# Patient Record
Sex: FEMALE | Race: BLACK OR AFRICAN AMERICAN | NOT HISPANIC OR LATINO | Employment: OTHER | URBAN - METROPOLITAN AREA
[De-identification: names, ages, dates, MRNs, and addresses within clinical notes are randomized per-mention and may not be internally consistent; named-entity substitution may affect disease eponyms.]

---

## 2018-03-05 PROBLEM — I05.9 MITRAL VALVE DISORDER: Status: ACTIVE | Noted: 2018-03-05

## 2022-01-06 ENCOUNTER — OFFICE VISIT (OUTPATIENT)
Dept: FAMILY MEDICINE | Facility: CLINIC | Age: 72
End: 2022-01-06
Payer: COMMERCIAL

## 2022-01-06 VITALS
DIASTOLIC BLOOD PRESSURE: 62 MMHG | SYSTOLIC BLOOD PRESSURE: 120 MMHG | BODY MASS INDEX: 26.71 KG/M2 | TEMPERATURE: 98 F | WEIGHT: 165.44 LBS | OXYGEN SATURATION: 97 % | HEART RATE: 47 BPM

## 2022-01-06 DIAGNOSIS — R00.1 BRADYCARDIA: ICD-10-CM

## 2022-01-06 DIAGNOSIS — R07.9 CHEST PAIN WITH HIGH RISK OF ACUTE CORONARY SYNDROME: ICD-10-CM

## 2022-01-06 DIAGNOSIS — R53.83 FATIGUE, UNSPECIFIED TYPE: Primary | ICD-10-CM

## 2022-01-06 PROCEDURE — 1125F AMNT PAIN NOTED PAIN PRSNT: CPT | Mod: CPTII,S$GLB,, | Performed by: PHYSICIAN ASSISTANT

## 2022-01-06 PROCEDURE — 1160F RVW MEDS BY RX/DR IN RCRD: CPT | Mod: CPTII,S$GLB,, | Performed by: PHYSICIAN ASSISTANT

## 2022-01-06 PROCEDURE — 3288F FALL RISK ASSESSMENT DOCD: CPT | Mod: CPTII,S$GLB,, | Performed by: PHYSICIAN ASSISTANT

## 2022-01-06 PROCEDURE — 1159F MED LIST DOCD IN RCRD: CPT | Mod: CPTII,S$GLB,, | Performed by: PHYSICIAN ASSISTANT

## 2022-01-06 PROCEDURE — 3078F PR MOST RECENT DIASTOLIC BLOOD PRESSURE < 80 MM HG: ICD-10-PCS | Mod: CPTII,S$GLB,, | Performed by: PHYSICIAN ASSISTANT

## 2022-01-06 PROCEDURE — 1159F PR MEDICATION LIST DOCUMENTED IN MEDICAL RECORD: ICD-10-PCS | Mod: CPTII,S$GLB,, | Performed by: PHYSICIAN ASSISTANT

## 2022-01-06 PROCEDURE — 3074F SYST BP LT 130 MM HG: CPT | Mod: CPTII,S$GLB,, | Performed by: PHYSICIAN ASSISTANT

## 2022-01-06 PROCEDURE — 99203 OFFICE O/P NEW LOW 30 MIN: CPT | Mod: S$GLB,,, | Performed by: PHYSICIAN ASSISTANT

## 2022-01-06 PROCEDURE — 1101F PT FALLS ASSESS-DOCD LE1/YR: CPT | Mod: CPTII,S$GLB,, | Performed by: PHYSICIAN ASSISTANT

## 2022-01-06 PROCEDURE — 1101F PR PT FALLS ASSESS DOC 0-1 FALLS W/OUT INJ PAST YR: ICD-10-PCS | Mod: CPTII,S$GLB,, | Performed by: PHYSICIAN ASSISTANT

## 2022-01-06 PROCEDURE — 3288F PR FALLS RISK ASSESSMENT DOCUMENTED: ICD-10-PCS | Mod: CPTII,S$GLB,, | Performed by: PHYSICIAN ASSISTANT

## 2022-01-06 PROCEDURE — 3078F DIAST BP <80 MM HG: CPT | Mod: CPTII,S$GLB,, | Performed by: PHYSICIAN ASSISTANT

## 2022-01-06 PROCEDURE — 3074F PR MOST RECENT SYSTOLIC BLOOD PRESSURE < 130 MM HG: ICD-10-PCS | Mod: CPTII,S$GLB,, | Performed by: PHYSICIAN ASSISTANT

## 2022-01-06 PROCEDURE — 1160F PR REVIEW ALL MEDS BY PRESCRIBER/CLIN PHARMACIST DOCUMENTED: ICD-10-PCS | Mod: CPTII,S$GLB,, | Performed by: PHYSICIAN ASSISTANT

## 2022-01-06 PROCEDURE — 1125F PR PAIN SEVERITY QUANTIFIED, PAIN PRESENT: ICD-10-PCS | Mod: CPTII,S$GLB,, | Performed by: PHYSICIAN ASSISTANT

## 2022-01-06 PROCEDURE — 3008F BODY MASS INDEX DOCD: CPT | Mod: CPTII,S$GLB,, | Performed by: PHYSICIAN ASSISTANT

## 2022-01-06 PROCEDURE — 3008F PR BODY MASS INDEX (BMI) DOCUMENTED: ICD-10-PCS | Mod: CPTII,S$GLB,, | Performed by: PHYSICIAN ASSISTANT

## 2022-01-06 PROCEDURE — 99203 PR OFFICE/OUTPT VISIT, NEW, LEVL III, 30-44 MIN: ICD-10-PCS | Mod: S$GLB,,, | Performed by: PHYSICIAN ASSISTANT

## 2022-01-06 RX ORDER — ERGOCALCIFEROL 1.25 MG/1
50000 CAPSULE ORAL
COMMUNITY
Start: 2021-02-03 | End: 2022-07-28 | Stop reason: SDUPTHER

## 2022-01-06 RX ORDER — FUROSEMIDE 40 MG/1
1 TABLET ORAL DAILY
COMMUNITY
Start: 2021-11-19 | End: 2022-01-20

## 2022-01-06 RX ORDER — HYDROCHLOROTHIAZIDE 12.5 MG/1
1 CAPSULE ORAL DAILY
COMMUNITY
Start: 2021-01-13 | End: 2022-01-14

## 2022-01-06 RX ORDER — OMEPRAZOLE 40 MG/1
1 CAPSULE, DELAYED RELEASE ORAL DAILY
COMMUNITY
Start: 2021-03-30 | End: 2022-07-28 | Stop reason: SDUPTHER

## 2022-01-06 NOTE — PROGRESS NOTES
Subjective:       Patient ID: Aleida Bliss is a 71 y.o. female.    Chief Complaint: Establish Care    Fatigue  This is a recurrent problem. The current episode started more than 1 month ago. The problem occurs constantly. The problem has been waxing and waning. Associated symptoms include chest pain, fatigue, vertigo and weakness. The symptoms are aggravated by exertion. Treatments tried: Reports being followed by [facility], but nothing has been done.  The treatment provided no relief.     Past Medical History:   Diagnosis Date    Coronary artery disease     Hypertension     SOB (shortness of breath)        Past Surgical History:   Procedure Laterality Date    CATARACT EXTRACTION EXTRACAPSULAR W/ INTRAOCULAR LENS IMPLANTATION      HYSTERECTOMY         Family History   Problem Relation Age of Onset    Heart disease Mother     Cancer Father         prostate       Social History     Socioeconomic History    Marital status:    Tobacco Use    Smoking status: Never Smoker    Smokeless tobacco: Never Used   Substance and Sexual Activity    Alcohol use: No    Drug use: No    Sexual activity: Not Currently       Review of patient's allergies indicates:   Allergen Reactions    Lisinopril Other (See Comments)     Other reaction(s): Cough      Losartan Other (See Comments)     Other reaction(s): Unknown      Cyclobenzaprine Rash         Current Outpatient Medications:     aspirin 81 MG Chew, Take 81 mg by mouth once daily., Disp: , Rfl:     atenolol (TENORMIN) 50 MG tablet, Take 25 mg by mouth 2 (two) times daily. , Disp: , Rfl:     ergocalciferol (ERGOCALCIFEROL) 50,000 unit Cap, Take 50,000 Units by mouth., Disp: , Rfl:     fluticasone propionate (FLONASE) 50 mcg/actuation nasal spray, 2 sprays (100 mcg total) by Each Nostril route once daily., Disp: 3 g, Rfl: 3    furosemide (LASIX) 40 MG tablet, Take 1 tablet by mouth once daily., Disp: , Rfl:     hydrALAZINE (APRESOLINE) 25 MG tablet,  Take 1 tablet (25 mg total) by mouth every 8 (eight) hours., Disp: 90 tablet, Rfl: 3    hydroCHLOROthiazide (MICROZIDE) 12.5 mg capsule, Take 1 capsule by mouth once daily., Disp: , Rfl:     levocetirizine (XYZAL) 5 MG tablet, Take 5 mg by mouth once daily., Disp: , Rfl:     omeprazole (PRILOSEC) 40 MG capsule, Take 1 capsule by mouth once daily., Disp: , Rfl:     potassium chloride (KLOR-CON) 10 MEQ TbSR, Take 10 mEq by mouth once daily., Disp: , Rfl:     pravastatin (PRAVACHOL) 40 MG tablet, Take 40 mg by mouth every evening., Disp: , Rfl:     spironolactone (ALDACTONE) 25 MG tablet, Take 25 mg by mouth once daily., Disp: , Rfl:     sucralfate (CARAFATE) 1 gram tablet, Take 1 g by mouth every evening., Disp: , Rfl:     umeclidinium-vilanteroL (ANORO ELLIPTA) 62.5-25 mcg/actuation DsDv, Inhale 1 puff into the lungs., Disp: , Rfl:     /62   Pulse (!) 47   Temp 97.8 °F (36.6 °C) (Oral)   Wt 75 kg (165 lb 7.3 oz)   SpO2 97%   BMI 26.71 kg/m²   Review of Systems   Constitutional: Positive for activity change, appetite change and fatigue.   Respiratory: Positive for chest tightness and shortness of breath.    Cardiovascular: Positive for chest pain.   Neurological: Positive for dizziness, vertigo, syncope and weakness.         Objective:      Physical Exam  Constitutional:       General: She is not in acute distress.     Appearance: Normal appearance. She is not ill-appearing, toxic-appearing or diaphoretic.   HENT:      Head: Normocephalic and atraumatic.   Neck:      Vascular: Carotid bruit present.   Cardiovascular:      Rate and Rhythm: Bradycardia present. Rhythm irregular.   Musculoskeletal:      Cervical back: No rigidity or tenderness.   Lymphadenopathy:      Cervical: No cervical adenopathy.   Neurological:      Mental Status: She is alert.         Lab Results   Component Value Date    WBC 6.19 03/05/2018    HGB 11.8 (L) 03/05/2018    HCT 37.9 03/05/2018    MCV 88 03/05/2018      03/05/2018     CMP  Sodium   Date Value Ref Range Status   03/05/2018 144 136 - 145 mmol/L Final     Potassium   Date Value Ref Range Status   03/05/2018 4.0 3.5 - 5.1 mmol/L Final     Chloride   Date Value Ref Range Status   03/05/2018 108 95 - 110 mmol/L Final     CO2   Date Value Ref Range Status   03/05/2018 28 22 - 31 mmol/L Final     Glucose   Date Value Ref Range Status   03/05/2018 98 70 - 110 mg/dL Final     Comment:     The ADA recommends the following guidelines for fasting glucose:  Normal:       less than 100 mg/dL  Prediabetes:  100 mg/dL to 125 mg/dL  Diabetes:     126 mg/dL or higher       BUN   Date Value Ref Range Status   03/05/2018 13 7 - 18 mg/dL Final     Creatinine   Date Value Ref Range Status   03/05/2018 0.69 0.50 - 1.40 mg/dL Final     Calcium   Date Value Ref Range Status   03/05/2018 10.2 8.4 - 10.2 mg/dL Final     Total Protein   Date Value Ref Range Status   03/05/2018 7.6 6.0 - 8.4 g/dL Final     Albumin   Date Value Ref Range Status   03/05/2018 3.9 3.5 - 5.2 g/dL Final     Total Bilirubin   Date Value Ref Range Status   03/05/2018 0.6 0.2 - 1.3 mg/dL Final     Alkaline Phosphatase   Date Value Ref Range Status   03/05/2018 73 38 - 145 U/L Final     AST   Date Value Ref Range Status   03/05/2018 25 14 - 36 U/L Final     ALT   Date Value Ref Range Status   03/05/2018 32 10 - 44 U/L Final     Anion Gap   Date Value Ref Range Status   03/05/2018 8 8 - 16 mmol/L Final     eGFR if    Date Value Ref Range Status   03/05/2018 >60 >60 mL/min/1.73 m^2 Final     eGFR if non    Date Value Ref Range Status   03/05/2018 >60 >60 mL/min/1.73 m^2 Final     Comment:     Calculation used to obtain the estimated glomerular filtration  rate (eGFR) is the CKD-EPI equation.        No results found for: CHOL  No results found for: HDL  No results found for: LDLCALC  No results found for: TRIG  No results found for: CHOLHDL  Lab Results   Component Value Date    HGBA1C 6.2  (H) 12/15/2021     Assessment:       Problem List Items Addressed This Visit    None     Visit Diagnoses     Fatigue, unspecified type    -  Primary    Relevant Orders    Ambulatory referral/consult to Cardiology    Chest pain with high risk of acute coronary syndrome        Relevant Orders    Ambulatory referral/consult to Cardiology    Bradycardia        Relevant Orders    Ambulatory referral/consult to Cardiology          Plan:       Fatigue, unspecified type  -     Ambulatory referral/consult to Cardiology; Future; Expected date: 01/13/2022    Chest pain with high risk of acute coronary syndrome  -     Ambulatory referral/consult to Cardiology; Future; Expected date: 01/13/2022    Bradycardia  -     Ambulatory referral/consult to Cardiology; Future; Expected date: 01/13/2022

## 2022-01-12 DIAGNOSIS — Z12.11 COLON CANCER SCREENING: ICD-10-CM

## 2022-01-14 ENCOUNTER — OFFICE VISIT (OUTPATIENT)
Dept: CARDIOLOGY | Facility: CLINIC | Age: 72
End: 2022-01-14
Payer: COMMERCIAL

## 2022-01-14 VITALS
HEIGHT: 66 IN | SYSTOLIC BLOOD PRESSURE: 139 MMHG | HEART RATE: 72 BPM | WEIGHT: 165.81 LBS | DIASTOLIC BLOOD PRESSURE: 78 MMHG | BODY MASS INDEX: 26.65 KG/M2

## 2022-01-14 DIAGNOSIS — R94.30 ELEVATED LEFT VENTRICULAR END-DIASTOLIC PRESSURE (LVEDP): ICD-10-CM

## 2022-01-14 DIAGNOSIS — R53.83 FATIGUE, UNSPECIFIED TYPE: Chronic | ICD-10-CM

## 2022-01-14 DIAGNOSIS — Z86.79 H/O: RHEUMATIC FEVER: ICD-10-CM

## 2022-01-14 DIAGNOSIS — R00.1 BRADYCARDIA: Chronic | ICD-10-CM

## 2022-01-14 DIAGNOSIS — E78.00 HYPERCHOLESTEROLEMIA: ICD-10-CM

## 2022-01-14 DIAGNOSIS — R94.31 NONSPECIFIC ABNORMAL ELECTROCARDIOGRAM (ECG) (EKG): Chronic | ICD-10-CM

## 2022-01-14 DIAGNOSIS — I10 PRIMARY HYPERTENSION: Chronic | ICD-10-CM

## 2022-01-14 DIAGNOSIS — I27.20 PULMONARY HYPERTENSION: Chronic | ICD-10-CM

## 2022-01-14 DIAGNOSIS — I34.0 NONRHEUMATIC MITRAL VALVE REGURGITATION: ICD-10-CM

## 2022-01-14 DIAGNOSIS — R06.02 SOB (SHORTNESS OF BREATH): ICD-10-CM

## 2022-01-14 DIAGNOSIS — R07.9 CHEST PAIN WITH HIGH RISK OF ACUTE CORONARY SYNDROME: Primary | ICD-10-CM

## 2022-01-14 DIAGNOSIS — E66.3 OVERWEIGHT (BMI 25.0-29.9): Chronic | ICD-10-CM

## 2022-01-14 DIAGNOSIS — R26.89 IMBALANCE: Chronic | ICD-10-CM

## 2022-01-14 PROCEDURE — 3008F PR BODY MASS INDEX (BMI) DOCUMENTED: ICD-10-PCS | Mod: CPTII,S$GLB,, | Performed by: INTERNAL MEDICINE

## 2022-01-14 PROCEDURE — 3008F BODY MASS INDEX DOCD: CPT | Mod: CPTII,S$GLB,, | Performed by: INTERNAL MEDICINE

## 2022-01-14 PROCEDURE — 93010 ELECTROCARDIOGRAM REPORT: CPT | Mod: S$GLB,,, | Performed by: INTERNAL MEDICINE

## 2022-01-14 PROCEDURE — 99204 PR OFFICE/OUTPT VISIT, NEW, LEVL IV, 45-59 MIN: ICD-10-PCS | Mod: S$GLB,,, | Performed by: INTERNAL MEDICINE

## 2022-01-14 PROCEDURE — 99999 PR PBB SHADOW E&M-EST. PATIENT-LVL IV: ICD-10-PCS | Mod: PBBFAC,,, | Performed by: INTERNAL MEDICINE

## 2022-01-14 PROCEDURE — 1159F MED LIST DOCD IN RCRD: CPT | Mod: CPTII,S$GLB,, | Performed by: INTERNAL MEDICINE

## 2022-01-14 PROCEDURE — 3078F DIAST BP <80 MM HG: CPT | Mod: CPTII,S$GLB,, | Performed by: INTERNAL MEDICINE

## 2022-01-14 PROCEDURE — 99204 OFFICE O/P NEW MOD 45 MIN: CPT | Mod: S$GLB,,, | Performed by: INTERNAL MEDICINE

## 2022-01-14 PROCEDURE — 1126F PR PAIN SEVERITY QUANTIFIED, NO PAIN PRESENT: ICD-10-PCS | Mod: CPTII,S$GLB,, | Performed by: INTERNAL MEDICINE

## 2022-01-14 PROCEDURE — 1101F PR PT FALLS ASSESS DOC 0-1 FALLS W/OUT INJ PAST YR: ICD-10-PCS | Mod: CPTII,S$GLB,, | Performed by: INTERNAL MEDICINE

## 2022-01-14 PROCEDURE — 1126F AMNT PAIN NOTED NONE PRSNT: CPT | Mod: CPTII,S$GLB,, | Performed by: INTERNAL MEDICINE

## 2022-01-14 PROCEDURE — 3288F PR FALLS RISK ASSESSMENT DOCUMENTED: ICD-10-PCS | Mod: CPTII,S$GLB,, | Performed by: INTERNAL MEDICINE

## 2022-01-14 PROCEDURE — 1159F PR MEDICATION LIST DOCUMENTED IN MEDICAL RECORD: ICD-10-PCS | Mod: CPTII,S$GLB,, | Performed by: INTERNAL MEDICINE

## 2022-01-14 PROCEDURE — 99999 PR PBB SHADOW E&M-EST. PATIENT-LVL IV: CPT | Mod: PBBFAC,,, | Performed by: INTERNAL MEDICINE

## 2022-01-14 PROCEDURE — 3075F SYST BP GE 130 - 139MM HG: CPT | Mod: CPTII,S$GLB,, | Performed by: INTERNAL MEDICINE

## 2022-01-14 PROCEDURE — 3078F PR MOST RECENT DIASTOLIC BLOOD PRESSURE < 80 MM HG: ICD-10-PCS | Mod: CPTII,S$GLB,, | Performed by: INTERNAL MEDICINE

## 2022-01-14 PROCEDURE — 93010 EKG 12-LEAD: ICD-10-PCS | Mod: S$GLB,,, | Performed by: INTERNAL MEDICINE

## 2022-01-14 PROCEDURE — 93005 ELECTROCARDIOGRAM TRACING: CPT | Mod: PO

## 2022-01-14 PROCEDURE — 1101F PT FALLS ASSESS-DOCD LE1/YR: CPT | Mod: CPTII,S$GLB,, | Performed by: INTERNAL MEDICINE

## 2022-01-14 PROCEDURE — 3288F FALL RISK ASSESSMENT DOCD: CPT | Mod: CPTII,S$GLB,, | Performed by: INTERNAL MEDICINE

## 2022-01-14 PROCEDURE — 3075F PR MOST RECENT SYSTOLIC BLOOD PRESS GE 130-139MM HG: ICD-10-PCS | Mod: CPTII,S$GLB,, | Performed by: INTERNAL MEDICINE

## 2022-01-14 RX ORDER — NITROGLYCERIN 0.4 MG/1
0.4 TABLET SUBLINGUAL EVERY 5 MIN PRN
Qty: 25 TABLET | Refills: 0 | Status: SHIPPED | OUTPATIENT
Start: 2022-01-14 | End: 2022-03-30

## 2022-01-14 NOTE — PROGRESS NOTES
Subjective:    Patient ID:  Aleida Bliss is a 71 y.o. female who presents for Chest Pain        Chest Pain   This is a recurrent problem. The current episode started more than 1 month ago. The pain is present in the lateral region. The pain is at a severity of 4/10. The quality of the pain is described as pressure. The pain radiates to the left neck. Associated symptoms include shortness of breath. Pertinent negatives include no abdominal pain, back pain, claudication, cough, diaphoresis, dizziness, hemoptysis, irregular heartbeat, malaise/fatigue, near-syncope, numbness, orthopnea, palpitations, PND, sputum production, syncope, vomiting or weakness. The pain is aggravated by exertion. Risk factors include stress.   Shortness of Breath  The problem occurs daily. Associated symptoms include chest pain. Pertinent negatives include no abdominal pain, claudication, hemoptysis, leg swelling, orthopnea, PND, sputum production, syncope, vomiting or wheezing. The symptoms are aggravated by exercise. She has tried rest for the symptoms.   Hypertension  This is a chronic problem. The current episode started more than 1 year ago. Associated symptoms include chest pain and shortness of breath. Pertinent negatives include no blurred vision, malaise/fatigue, orthopnea, palpitations or PND. Risk factors for coronary artery disease include obesity and post-menopausal state. Past treatments include beta blockers, diuretics and direct vasodilators. Hypertensive end-organ damage includes left ventricular hypertrophy.     NEW PATIENT EVALUATION REFERRED BY [facility] FOR CHEST PAIN, HAD A CARDIAC CATHETERIZATION RIGHT AND LEFT HEART CATHETERIZATION IN 2018 BY  THEY DID SHOWED ELEVATED LEFT VENTRICULAR END-DIASTOLIC PRESSURE AND MILD PULMONARY HYPERTENSION MILD MR, MILD CAD, PT IS BORDERLINE DIABETIC OR PRE-DIABETIC, WAS [place]IZ[place] IN [place], [place] [facility] [place] STARTED ON LASIX, H/O RHEUMATIC FEVER,IMBALANCE [place] MON,  WORSE,  SEE ROS    Past Medical History:   Diagnosis Date    Coronary artery disease     Hypertension     SOB (shortness of breath)      Past Surgical History:   Procedure Laterality Date    CATARACT EXTRACTION EXTRACAPSULAR W/ INTRAOCULAR LENS IMPLANTATION      HYSTERECTOMY       Family History   Problem Relation Age of Onset    Heart disease Mother     Cancer Father         prostate     Social History     Socioeconomic History    Marital status:    Tobacco Use    Smoking status: Never Smoker    Smokeless tobacco: Never Used   Substance and Sexual Activity    Alcohol use: No    Drug use: No    Sexual activity: Not Currently       Review of patient's allergies indicates:   Allergen Reactions    Lisinopril Other (See Comments)     Other reaction(s): Cough      Losartan Other (See Comments)     Other reaction(s): Unknown      Cyclobenzaprine Rash       Current Outpatient Medications:     aspirin 81 MG Chew, Take 81 mg by mouth once daily., Disp: , Rfl:     atenolol (TENORMIN) 50 MG tablet, Take 25 mg by mouth 2 (two) times daily. , Disp: , Rfl:     ergocalciferol (ERGOCALCIFEROL) 50,000 unit Cap, Take 50,000 Units by mouth., Disp: , Rfl:     fluticasone propionate (FLONASE) 50 mcg/actuation nasal spray, 2 sprays (100 mcg total) by Each Nostril route once daily., Disp: 3 g, Rfl: 3    furosemide (LASIX) 40 MG tablet, Take 1 tablet by mouth once daily., Disp: , Rfl:     hydrALAZINE (APRESOLINE) 25 MG tablet, Take 1 tablet (25 mg total) by mouth every 8 (eight) hours., Disp: 90 tablet, Rfl: 3    omeprazole (PRILOSEC) 40 MG capsule, Take 1 capsule by mouth once daily., Disp: , Rfl:     potassium chloride (KLOR-CON) 10 MEQ TbSR, Take 10 mEq by mouth once daily., Disp: , Rfl:     pravastatin (PRAVACHOL) 40 MG tablet, Take 40 mg by mouth every evening., Disp: , Rfl:     sucralfate (CARAFATE) 1 gram tablet, Take 1 g by mouth every evening., Disp: , Rfl:     umeclidinium-vilanteroL (ANORO  "ELLIPTA) 62.5-25 mcg/actuation DsDv, Inhale 1 puff into the lungs., Disp: , Rfl:     nitroGLYCERIN (NITROSTAT) 0.4 MG SL tablet, Place 1 tablet (0.4 mg total) under the tongue every 5 (five) minutes as needed for Chest pain., Disp: 25 tablet, Rfl: 0    Review of Systems   Constitutional: Negative for chills, diaphoresis, malaise/fatigue and night sweats.   HENT: Negative for congestion, hearing loss and nosebleeds.    Eyes: Negative for blurred vision, discharge, double vision and visual disturbance.   Cardiovascular: Positive for chest pain and dyspnea on exertion. Negative for claudication, cyanosis, irregular heartbeat, leg swelling, near-syncope, orthopnea, palpitations, paroxysmal nocturnal dyspnea and syncope.   Respiratory: Positive for shortness of breath. Negative for cough, hemoptysis, snoring, sputum production and wheezing.    Endocrine: Negative for cold intolerance, heat intolerance and polyuria.   Hematologic/Lymphatic: Negative for adenopathy. Does not bruise/bleed easily.   Skin: Negative for color change, itching and nail changes.   Musculoskeletal: Positive for arthritis (RA). Negative for back pain and falls.   Gastrointestinal: Positive for bloating. Negative for abdominal pain, change in bowel habit, dysphagia, heartburn, hematemesis, jaundice, melena and vomiting.   Genitourinary: Negative for dysuria, flank pain and frequency.   Neurological: Positive for loss of balance (STAGERS). Negative for brief paralysis, dizziness, focal weakness, light-headedness, numbness, paresthesias, sensory change, tremors and weakness.   Psychiatric/Behavioral: Negative for altered mental status, depression, memory loss and substance abuse. The patient is not nervous/anxious.    Allergic/Immunologic: Negative for environmental allergies and persistent infections.        Objective:      Vitals:    01/14/22 0902   BP: 139/78   Pulse: 72   Weight: 75.2 kg (165 lb 12.6 oz)   Height: 5' 6" (1.676 m)   PainSc: 0-No " pain     Body mass index is 26.76 kg/m².    Physical Exam  Constitutional:       General: She is active.      Appearance: She is well-developed and well-nourished.   HENT:      Head: Normocephalic and atraumatic.      Mouth/Throat:      Mouth: Oropharynx is clear and moist and mucous membranes are normal.   Eyes:      Extraocular Movements: Extraocular movements intact and EOM normal.      Conjunctiva/sclera: Conjunctivae normal.      Pupils: Pupils are equal, round, and reactive to light.   Neck:      Thyroid: No thyromegaly.      Vascular: Normal carotid pulses. No carotid bruit, hepatojugular reflux or JVD.      Trachea: Trachea normal. No tracheal deviation.   Cardiovascular:      Rate and Rhythm: Normal rate and regular rhythm.  No extrasystoles are present.     Chest Wall: PMI is not displaced.      Pulses:           Carotid pulses are 2+ on the right side and 2+ on the left side.       Radial pulses are 2+ on the right side and 2+ on the left side.        Femoral pulses are 2+ on the right side and 2+ on the left side.       Popliteal pulses are 2+ on the right side and 2+ on the left side.        Dorsalis pedis pulses are 2+ on the right side and 2+ on the left side.        Posterior tibial pulses are 2+ on the right side and 2+ on the left side.      Heart sounds: Murmur heard.   No friction rub. No gallop.    Pulmonary:      Effort: Pulmonary effort is normal. No tachypnea or bradypnea.      Breath sounds: Normal breath sounds. No wheezing or rales.   Chest:      Chest wall: No tenderness.   Abdominal:      General: Bowel sounds are normal.      Palpations: Abdomen is soft. There is no hepatosplenomegaly or mass.      Tenderness: There is no CVA tenderness or guarding.   Musculoskeletal:         General: No edema. Normal range of motion.      Cervical back: Normal range of motion and neck supple. No edema or erythema.      Right lower leg: No edema.      Left lower leg: No edema.   Lymphadenopathy:       Cervical: No cervical adenopathy.   Skin:     General: Skin is warm and dry.      Findings: No rash.      Nails: There is no cyanosis.   Neurological:      General: No focal deficit present.      Mental Status: She is alert and oriented to person, place, and time.      Cranial Nerves: No cranial nerve deficit.      Motor: No tremor or abnormal muscle tone.      Gait: Gait abnormal.      Deep Tendon Reflexes: Strength normal.   Psychiatric:         Mood and Affect: Mood and affect and mood normal.         Speech: Speech normal.         Behavior: Behavior normal.         Thought Content: Thought content normal.                 ..    Chemistry        Component Value Date/Time     03/05/2018 1025    K 4.0 03/05/2018 1025     03/05/2018 1025    CO2 28 03/05/2018 1025    BUN 13 03/05/2018 1025    CREATININE 0.69 03/05/2018 1025    GLU 98 03/05/2018 1025        Component Value Date/Time    CALCIUM 10.2 03/05/2018 1025    ALKPHOS 73 03/05/2018 1025    AST 25 03/05/2018 1025    ALT 32 03/05/2018 1025    BILITOT 0.6 03/05/2018 1025    ESTGFRAFRICA >60 03/05/2018 1025    EGFRNONAA >60 03/05/2018 1025            ..No results found for: CHOL  No results found for: HDL  No results found for: LDLCALC  No results found for: TRIG  No results found for: CHOLHDL  ..  Lab Results   Component Value Date    WBC 6.19 03/05/2018    HGB 11.8 (L) 03/05/2018    HCT 37.9 03/05/2018    MCV 88 03/05/2018     03/05/2018       Test(s) Reviewed  I have reviewed the following in detail:  [] Stress test   [] Angiography   [] Echocardiogram   [] Labs   [] Other:       Assessment:         ICD-10-CM ICD-9-CM   1. Chest pain with high risk of acute coronary syndrome  R07.9 786.50   2. Pulmonary hypertension  I27.20 416.8   3. Nonrheumatic mitral valve regurgitation  I34.0 424.0   4. Fatigue, unspecified type  R53.83 780.79   5. Bradycardia  R00.1 427.89   6. Elevated left ventricular end-diastolic pressure (LVEDP)  R94.30 794.30   7.  H/O: rheumatic fever  Z86.79 V12.09   8. SOB (shortness of breath)  R06.02 786.05   9. Overweight (BMI 25.0-29.9)  E66.3 278.02   10. Primary hypertension  I10 401.9   11. Hypercholesterolemia  E78.00 272.0   12. Nonspecific abnormal electrocardiogram (ECG) (EKG)  R94.31 794.31   13. Imbalance  R26.89 781.2     Problem List Items Addressed This Visit        Pulmonary    Pulmonary hypertension       Cardiac/Vascular    Chest pain with high risk of acute coronary syndrome - Primary    Relevant Medications    nitroGLYCERIN (NITROSTAT) 0.4 MG SL tablet    Other Relevant Orders    IN OFFICE EKG 12-LEAD (to Muse) (Completed)    Nuclear Stress - Cardiology Interpreted    Echo    Bradycardia    Nonrheumatic mitral valve regurgitation    Relevant Orders    Nuclear Stress - Cardiology Interpreted    Echo    Elevated left ventricular end-diastolic pressure (LVEDP)    H/O: rheumatic fever    Primary hypertension    Hypercholesterolemia    Nonspecific abnormal electrocardiogram (ECG) (EKG)       Endocrine    Overweight (BMI 25.0-29.9)       Other    Fatigue    SOB (shortness of breath)    Relevant Orders    Nuclear Stress - Cardiology Interpreted    Imbalance    Relevant Orders    Ambulatory referral/consult to Neurology           Plan:     EKG SR, LAD, LVH, WILL NEED FURTHER EVALUATION STRESS ECHO, PRN SL NTG, NEURO REFERRAL FOR IMBALANCE EXPLAINED THAT THIS IS NOT RELATED TO HER CARDIAC ISSUES, ASSESS ANGINA EQUIVALENT NO OVERT HEART FAILURE NO TIA TYPE SYMPTOMS NO ARRHYTHMIA NO SYNCOPE,, RTC IN FEW WEEKS, DISCUSSED PLAN WITH THE PATIENT AND WITH HER SISTER      Chest pain with high risk of acute coronary syndrome  Comments:  EVALUATE  Orders:  -     Ambulatory referral/consult to Cardiology  -     IN OFFICE EKG 12-LEAD (to Muse)  -     Nuclear Stress - Cardiology Interpreted; Future  -     Echo  -     nitroGLYCERIN (NITROSTAT) 0.4 MG SL tablet; Place 1 tablet (0.4 mg total) under the tongue every 5 (five) minutes as needed  for Chest pain.  Dispense: 25 tablet; Refill: 0    Pulmonary hypertension  Comments:  REASSESS    Nonrheumatic mitral valve regurgitation  -     Nuclear Stress - Cardiology Interpreted; Future  -     Echo    Fatigue, unspecified type  Comments:  WORKUP  Orders:  -     Ambulatory referral/consult to Cardiology    Bradycardia  Comments:  HEART RATE OK  Orders:  -     Ambulatory referral/consult to Cardiology    Elevated left ventricular end-diastolic pressure (LVEDP)    H/O: rheumatic fever    SOB (shortness of breath)  -     Nuclear Stress - Cardiology Interpreted; Future    Overweight (BMI 25.0-29.9)  Comments:  WEIGHT LOSS    Primary hypertension  Comments:  CONTROLLED, MONITOR    Hypercholesterolemia    Nonspecific abnormal electrocardiogram (ECG) (EKG)    Imbalance  Comments:  REFERRAL TO NEUROLOGY  Orders:  -     Ambulatory referral/consult to Neurology; Future; Expected date: 01/21/2022    RTC Low level/low impact aerobic exercise 5x's/wk. Heart healthy diet and risk factor modification.    See labs and med orders.    Aerobic exercise 5x's/wk. Heart healthy diet and risk factor modification.    See labs and med orders.

## 2022-01-20 ENCOUNTER — OFFICE VISIT (OUTPATIENT)
Dept: FAMILY MEDICINE | Facility: CLINIC | Age: 72
End: 2022-01-20
Payer: COMMERCIAL

## 2022-01-20 VITALS
DIASTOLIC BLOOD PRESSURE: 84 MMHG | BODY MASS INDEX: 26.86 KG/M2 | SYSTOLIC BLOOD PRESSURE: 130 MMHG | HEIGHT: 66 IN | HEART RATE: 68 BPM | TEMPERATURE: 98 F | WEIGHT: 167.13 LBS

## 2022-01-20 DIAGNOSIS — R26.89 IMBALANCE: ICD-10-CM

## 2022-01-20 DIAGNOSIS — Z12.39 ENCOUNTER FOR SCREENING FOR MALIGNANT NEOPLASM OF BREAST, UNSPECIFIED SCREENING MODALITY: Primary | ICD-10-CM

## 2022-01-20 PROCEDURE — 3079F DIAST BP 80-89 MM HG: CPT | Mod: CPTII,S$GLB,, | Performed by: PHYSICIAN ASSISTANT

## 2022-01-20 PROCEDURE — 1125F AMNT PAIN NOTED PAIN PRSNT: CPT | Mod: CPTII,S$GLB,, | Performed by: PHYSICIAN ASSISTANT

## 2022-01-20 PROCEDURE — 1125F PR PAIN SEVERITY QUANTIFIED, PAIN PRESENT: ICD-10-PCS | Mod: CPTII,S$GLB,, | Performed by: PHYSICIAN ASSISTANT

## 2022-01-20 PROCEDURE — 3075F PR MOST RECENT SYSTOLIC BLOOD PRESS GE 130-139MM HG: ICD-10-PCS | Mod: CPTII,S$GLB,, | Performed by: PHYSICIAN ASSISTANT

## 2022-01-20 PROCEDURE — 1159F MED LIST DOCD IN RCRD: CPT | Mod: CPTII,S$GLB,, | Performed by: PHYSICIAN ASSISTANT

## 2022-01-20 PROCEDURE — 3288F PR FALLS RISK ASSESSMENT DOCUMENTED: ICD-10-PCS | Mod: CPTII,S$GLB,, | Performed by: PHYSICIAN ASSISTANT

## 2022-01-20 PROCEDURE — 3008F BODY MASS INDEX DOCD: CPT | Mod: CPTII,S$GLB,, | Performed by: PHYSICIAN ASSISTANT

## 2022-01-20 PROCEDURE — 1101F PT FALLS ASSESS-DOCD LE1/YR: CPT | Mod: CPTII,S$GLB,, | Performed by: PHYSICIAN ASSISTANT

## 2022-01-20 PROCEDURE — 1160F RVW MEDS BY RX/DR IN RCRD: CPT | Mod: CPTII,S$GLB,, | Performed by: PHYSICIAN ASSISTANT

## 2022-01-20 PROCEDURE — 3288F FALL RISK ASSESSMENT DOCD: CPT | Mod: CPTII,S$GLB,, | Performed by: PHYSICIAN ASSISTANT

## 2022-01-20 PROCEDURE — 1159F PR MEDICATION LIST DOCUMENTED IN MEDICAL RECORD: ICD-10-PCS | Mod: CPTII,S$GLB,, | Performed by: PHYSICIAN ASSISTANT

## 2022-01-20 PROCEDURE — 3075F SYST BP GE 130 - 139MM HG: CPT | Mod: CPTII,S$GLB,, | Performed by: PHYSICIAN ASSISTANT

## 2022-01-20 PROCEDURE — 1160F PR REVIEW ALL MEDS BY PRESCRIBER/CLIN PHARMACIST DOCUMENTED: ICD-10-PCS | Mod: CPTII,S$GLB,, | Performed by: PHYSICIAN ASSISTANT

## 2022-01-20 PROCEDURE — 3079F PR MOST RECENT DIASTOLIC BLOOD PRESSURE 80-89 MM HG: ICD-10-PCS | Mod: CPTII,S$GLB,, | Performed by: PHYSICIAN ASSISTANT

## 2022-01-20 PROCEDURE — 1101F PR PT FALLS ASSESS DOC 0-1 FALLS W/OUT INJ PAST YR: ICD-10-PCS | Mod: CPTII,S$GLB,, | Performed by: PHYSICIAN ASSISTANT

## 2022-01-20 PROCEDURE — 99214 OFFICE O/P EST MOD 30 MIN: CPT | Mod: S$GLB,,, | Performed by: PHYSICIAN ASSISTANT

## 2022-01-20 PROCEDURE — 3008F PR BODY MASS INDEX (BMI) DOCUMENTED: ICD-10-PCS | Mod: CPTII,S$GLB,, | Performed by: PHYSICIAN ASSISTANT

## 2022-01-20 PROCEDURE — 99214 PR OFFICE/OUTPT VISIT, EST, LEVL IV, 30-39 MIN: ICD-10-PCS | Mod: S$GLB,,, | Performed by: PHYSICIAN ASSISTANT

## 2022-01-20 RX ORDER — POTASSIUM CHLORIDE 750 MG/1
10 TABLET, EXTENDED RELEASE ORAL DAILY
Qty: 90 TABLET | Refills: 1 | Status: SHIPPED | OUTPATIENT
Start: 2022-01-20 | End: 2022-07-11

## 2022-01-20 NOTE — PROGRESS NOTES
Subjective:       Patient ID: Aleida Bliss is a 71 y.o. female.    Chief Complaint: Follow-up    Neurologic Problem  The patient's primary symptoms include clumsiness, focal weakness, a loss of balance and weakness. This is a recurrent problem. The current episode started 1 to 4 weeks ago. The problem is unchanged. There was right-sided focality noted. Pertinent negatives include no chest pain, fatigue, fever or shortness of breath. Past treatments include nothing.     Past Medical History:   Diagnosis Date    Coronary artery disease     Hypertension     SOB (shortness of breath)        Review of Systems   Constitutional: Negative for activity change, appetite change, fatigue and fever.   Respiratory: Negative for chest tightness and shortness of breath.    Cardiovascular: Negative for chest pain.   Neurological: Positive for focal weakness, facial asymmetry, weakness, disturbances in coordination, loss of balance and coordination difficulties.         Objective:      Physical Exam  Constitutional:       General: She is not in acute distress.     Appearance: Normal appearance. She is not ill-appearing, toxic-appearing or diaphoretic.   Cardiovascular:      Rate and Rhythm: Normal rate and regular rhythm.      Pulses: Normal pulses.      Heart sounds: Normal heart sounds. No murmur heard.  No friction rub. No gallop.    Pulmonary:      Effort: Pulmonary effort is normal. No respiratory distress.      Breath sounds: Normal breath sounds. No stridor. No wheezing, rhonchi or rales.   Chest:      Chest wall: No tenderness.   Abdominal:      Tenderness: There is no abdominal tenderness.   Neurological:      Mental Status: She is alert.      Cranial Nerves: No facial asymmetry.      Sensory: Sensation is intact.      Motor: Weakness present.      Coordination: Coordination abnormal.      Gait: Gait abnormal.         Assessment:       Problem List Items Addressed This Visit     Imbalance    Relevant Orders     Ambulatory referral/consult to Neurology      Other Visit Diagnoses     Encounter for screening for malignant neoplasm of breast, unspecified screening modality    -  Primary    Relevant Orders    Mammo Digital Screening Bilat          Plan:       Encounter for screening for malignant neoplasm of breast, unspecified screening modality  -     Mammo Digital Screening Bilat; Future; Expected date: 01/20/2022    Imbalance  -     Ambulatory referral/consult to Neurology; Future; Expected date: 01/27/2022    Other orders  -     potassium chloride (KLOR-CON) 10 MEQ TbSR; Take 1 tablet (10 mEq total) by mouth once daily.  Dispense: 90 tablet; Refill: 1

## 2022-01-24 ENCOUNTER — PATIENT OUTREACH (OUTPATIENT)
Dept: ADMINISTRATIVE | Facility: OTHER | Age: 72
End: 2022-01-24
Payer: MEDICARE

## 2022-01-24 NOTE — PROGRESS NOTES
Health Maintenance Due   Topic Date Due    Hepatitis C Screening  Never done    TETANUS VACCINE  Never done    Colorectal Cancer Screening  Never done    Shingles Vaccine (1 of 2) Never done     Updates were requested from care everywhere.  Chart was reviewed for overdue Proactive [facility] Encounters (FABIAN) topics (CRS, Breast Cancer Screening, Eye exam)  Health Maintenance has been updated.  LINKS immunization registry triggered.  Immunizations were reconciled.

## 2022-01-27 ENCOUNTER — OFFICE VISIT (OUTPATIENT)
Dept: NEUROLOGY | Facility: CLINIC | Age: 72
End: 2022-01-27
Payer: MEDICARE

## 2022-01-27 VITALS
RESPIRATION RATE: 18 BRPM | BODY MASS INDEX: 26.73 KG/M2 | HEIGHT: 66 IN | WEIGHT: 166.31 LBS | HEART RATE: 75 BPM | DIASTOLIC BLOOD PRESSURE: 81 MMHG | SYSTOLIC BLOOD PRESSURE: 138 MMHG | TEMPERATURE: 97 F

## 2022-01-27 DIAGNOSIS — R26.89 IMBALANCE: Primary | ICD-10-CM

## 2022-01-27 DIAGNOSIS — Z12.11 SCREENING FOR COLON CANCER: Primary | ICD-10-CM

## 2022-01-27 DIAGNOSIS — M54.2 CERVICALGIA: ICD-10-CM

## 2022-01-27 PROCEDURE — 3079F PR MOST RECENT DIASTOLIC BLOOD PRESSURE 80-89 MM HG: ICD-10-PCS | Mod: CPTII,S$GLB,, | Performed by: NURSE PRACTITIONER

## 2022-01-27 PROCEDURE — 3079F DIAST BP 80-89 MM HG: CPT | Mod: CPTII,S$GLB,, | Performed by: NURSE PRACTITIONER

## 2022-01-27 PROCEDURE — 1125F PR PAIN SEVERITY QUANTIFIED, PAIN PRESENT: ICD-10-PCS | Mod: CPTII,S$GLB,, | Performed by: NURSE PRACTITIONER

## 2022-01-27 PROCEDURE — 1159F PR MEDICATION LIST DOCUMENTED IN MEDICAL RECORD: ICD-10-PCS | Mod: CPTII,S$GLB,, | Performed by: NURSE PRACTITIONER

## 2022-01-27 PROCEDURE — 1160F RVW MEDS BY RX/DR IN RCRD: CPT | Mod: CPTII,S$GLB,, | Performed by: NURSE PRACTITIONER

## 2022-01-27 PROCEDURE — 99999 PR PBB SHADOW E&M-EST. PATIENT-LVL V: ICD-10-PCS | Mod: PBBFAC,,, | Performed by: NURSE PRACTITIONER

## 2022-01-27 PROCEDURE — 1159F MED LIST DOCD IN RCRD: CPT | Mod: CPTII,S$GLB,, | Performed by: NURSE PRACTITIONER

## 2022-01-27 PROCEDURE — 99215 OFFICE O/P EST HI 40 MIN: CPT | Mod: PBBFAC,PO | Performed by: NURSE PRACTITIONER

## 2022-01-27 PROCEDURE — 3075F SYST BP GE 130 - 139MM HG: CPT | Mod: CPTII,S$GLB,, | Performed by: NURSE PRACTITIONER

## 2022-01-27 PROCEDURE — 3288F FALL RISK ASSESSMENT DOCD: CPT | Mod: CPTII,S$GLB,, | Performed by: NURSE PRACTITIONER

## 2022-01-27 PROCEDURE — 1101F PT FALLS ASSESS-DOCD LE1/YR: CPT | Mod: CPTII,S$GLB,, | Performed by: NURSE PRACTITIONER

## 2022-01-27 PROCEDURE — 1160F PR REVIEW ALL MEDS BY PRESCRIBER/CLIN PHARMACIST DOCUMENTED: ICD-10-PCS | Mod: CPTII,S$GLB,, | Performed by: NURSE PRACTITIONER

## 2022-01-27 PROCEDURE — 99999 PR PBB SHADOW E&M-EST. PATIENT-LVL V: CPT | Mod: PBBFAC,,, | Performed by: NURSE PRACTITIONER

## 2022-01-27 PROCEDURE — 3288F PR FALLS RISK ASSESSMENT DOCUMENTED: ICD-10-PCS | Mod: CPTII,S$GLB,, | Performed by: NURSE PRACTITIONER

## 2022-01-27 PROCEDURE — 3008F BODY MASS INDEX DOCD: CPT | Mod: CPTII,S$GLB,, | Performed by: NURSE PRACTITIONER

## 2022-01-27 PROCEDURE — 99215 OFFICE O/P EST HI 40 MIN: CPT | Mod: S$GLB,,, | Performed by: NURSE PRACTITIONER

## 2022-01-27 PROCEDURE — 3075F PR MOST RECENT SYSTOLIC BLOOD PRESS GE 130-139MM HG: ICD-10-PCS | Mod: CPTII,S$GLB,, | Performed by: NURSE PRACTITIONER

## 2022-01-27 PROCEDURE — 1101F PR PT FALLS ASSESS DOC 0-1 FALLS W/OUT INJ PAST YR: ICD-10-PCS | Mod: CPTII,S$GLB,, | Performed by: NURSE PRACTITIONER

## 2022-01-27 PROCEDURE — 99215 PR OFFICE/OUTPT VISIT, EST, LEVL V, 40-54 MIN: ICD-10-PCS | Mod: S$GLB,,, | Performed by: NURSE PRACTITIONER

## 2022-01-27 PROCEDURE — 3008F PR BODY MASS INDEX (BMI) DOCUMENTED: ICD-10-PCS | Mod: CPTII,S$GLB,, | Performed by: NURSE PRACTITIONER

## 2022-01-27 PROCEDURE — 1125F AMNT PAIN NOTED PAIN PRSNT: CPT | Mod: CPTII,S$GLB,, | Performed by: NURSE PRACTITIONER

## 2022-01-27 NOTE — ASSESSMENT & PLAN NOTE
Reports of dizziness, poor balance and lightheadedness. She denies spinning sensation or recent falls.   Onset ~ November 2021 and intermittent  Episodes can last up to 30 mins or so.   She denies LOC but does endorse new onset cervical pain.   Neuro exam non focal but dizziness was brought on by examining neck ROM. Poor balance with gait assessment. RUE hyperreflexia.  Obtain MRI brain to r/o neuro component  Obtain MRI C-spine as dizziness and poor balance may be due to cervical component.    - call with results  Referral to balance PT

## 2022-01-27 NOTE — ASSESSMENT & PLAN NOTE
New onset neck pain, more so when turning her head.   Dizziness brought on by turning her head as well  No radiculopathy symptoms but hyperreflexia noted to RUE  Obtain spine imaging  Recommend alternating heat and ice as well as OTC  Referral to outpt therapy

## 2022-01-27 NOTE — PROGRESS NOTES
NEUROLOGY  Outpatient CONSULT    [facility]  [address], [place] [address]  [phone] (office) / [phone] (fax)    Patient Name:  Aleida Bliss  :  1950  MR #:  [id number]  Acct #:  [id number]    Date of Neurology Consult: 2022  Name of Provider: BEBETO De La Torre    Other Physicians:  Sebastian Montiel III, PA-C (Primary Care Physician); Sebastian Montiel III, P* (Referring)      Chief Complaint: Gait Problem (Imbalance)      History of Present Illness (HPI):  Aelida Bliss is a right handed 71 y.o. female.  Patient is here today for imbalanceness and dizziness. She was referred by her cardiologist. She denies spinning sensation. She reports feeling weak, nauseated and drunk. This is not constant and started around November. She does have a h/o mitral regurgitation and will be getting a stress test and Echo in the next month.   She notices the symptoms will come on when standing for long period of time or looking down. She will have to sit or lay down which helps the symptoms but doesn't alleviate them. She denies associated blurry vision, confusion or disorientation.  She also endorses pain in her neck and she has trouble with ROM. This has been going on for a couple of months. She denies pain or sensory symptoms in her arms. She denies recent falls.                   Past Medical, Surgical, Family & Social History:   Past Medical History:   Diagnosis Date    Coronary artery disease     Hypertension     SOB (shortness of breath)      Past Surgical History:   Procedure Laterality Date    CATARACT EXTRACTION EXTRACAPSULAR W/ INTRAOCULAR LENS IMPLANTATION      HYSTERECTOMY       Family History   Problem Relation Age of Onset    Heart disease Mother     Cancer Father         prostate     Alcohol use:  reports no history of alcohol use.   (Of note, 0.6 oz = 1 beer or 6 oz = 10 beers).  Tobacco use:  reports that she has never smoked. She has never used  "smokeless tobacco.  Street drug use:  reports no history of drug use.  Allergies: Lisinopril, Losartan, and Cyclobenzaprine.    Home Medications:     Current Outpatient Medications:     atenolol (TENORMIN) 50 MG tablet, Take 50 mg by mouth once daily., Disp: , Rfl:     ergocalciferol (ERGOCALCIFEROL) 50,000 unit Cap, Take 50,000 Units by mouth., Disp: , Rfl:     fluticasone propionate (FLONASE) 50 mcg/actuation nasal spray, 2 sprays (100 mcg total) by Each Nostril route once daily., Disp: 3 g, Rfl: 3    hydrALAZINE (APRESOLINE) 25 MG tablet, Take 1 tablet (25 mg total) by mouth every 8 (eight) hours. (Patient taking differently: Take 25 mg by mouth 3 (three) times daily.), Disp: 90 tablet, Rfl: 3    nitroGLYCERIN (NITROSTAT) 0.4 MG SL tablet, Place 1 tablet (0.4 mg total) under the tongue every 5 (five) minutes as needed for Chest pain., Disp: 25 tablet, Rfl: 0    omeprazole (PRILOSEC) 40 MG capsule, Take 1 capsule by mouth once daily., Disp: , Rfl:     potassium chloride (KLOR-CON) 10 MEQ TbSR, Take 1 tablet (10 mEq total) by mouth once daily., Disp: 90 tablet, Rfl: 1    pravastatin (PRAVACHOL) 40 MG tablet, Take 40 mg by mouth every evening., Disp: , Rfl:     sucralfate (CARAFATE) 1 gram tablet, Take 1 g by mouth every evening., Disp: , Rfl:     umeclidinium-vilanteroL (ANORO ELLIPTA) 62.5-25 mcg/actuation DsDv, Inhale 1 puff into the lungs., Disp: , Rfl:     aspirin 81 MG Chew, Take 81 mg by mouth once daily., Disp: , Rfl:     Physical Examination:  /81 (BP Location: Left arm, Patient Position: Sitting, BP Method: Medium (Automatic))   Pulse 75   Temp 97.1 °F (36.2 °C) (Skin)   Resp 18   Ht 5' 6" (1.676 m)   Wt 75.4 kg (166 lb 5.4 oz)   BMI 26.85 kg/m²     GENERAL:  General appearance: Well, non-toxic appearing.  No apparent distress.  Neck: supple.  .    MENTAL STATUS:  Alertness, attention span & concentration: normal.  Language: normal.  Orientation to self, place & time:  " normal.  Memory, recent & remote: normal.  Fund of knowledge: normal.      SPEECH:  Clear and fluent.  Follows complex commands.    CRANIAL NERVES:  Cranial Nerves II-XII were examined.  II - Visual fields: normal.  III, IV, VI: PERRL, EOMI, No ptosis, No nystagmus.  V - Facial sensation: normal.  VII - Face symmetry & mobility: not assessed d/t COVID precautions  VIII - Hearing: normal.  IX, X - Palate: not assessed d/t COVID precautions  XI - Shoulder shrug: normal.  XII - Tongue protrusion: not assessed d/t COVID precautions    GROSS MOTOR:  Gait & station: poor balance; slow, cautious; unable to perform tandem  Tone: normal.  Abnormal movements: none.  Finger-nose: normal.  Rapid alternating movements: normal.  Pronator drift: normal      MUSCLE STRENGTH:   pain with ROM  Generalized weakness throughout (4+/5)  Diff ROM to right shoulder      REFLEXES:    RIGHT Reflex   LEFT   3+ Biceps 2+   3+ Brachiorad. 2+        3+ Patellar 3+     SENSORY:  Light touch: Normal throughout.               Diagnostic Data Reviewed:     Component      Latest Ref Rng & Units 12/15/2021   Hemoglobin A1C External      4.8 - 5.6 % 6.2 (H)               Assessment and Plan:  Aleida Bliss is a 71 y.o. female.      Problem List Items Addressed This Visit        Orthopedic    Cervicalgia    Current Assessment & Plan     New onset neck pain, more so when turning her head.   Dizziness brought on by turning her head as well  No radiculopathy symptoms but hyperreflexia noted to RUE  Obtain spine imaging  Recommend alternating heat and ice as well as OTC  Referral to outpt therapy             Other    Imbalance - Primary    Current Assessment & Plan     Reports of dizziness, poor balance and lightheadedness. She denies spinning sensation or recent falls.   Onset ~ November 2021 and intermittent  Episodes can last up to 30 mins or so.   She denies LOC but does endorse new onset cervical pain.   Neuro exam non focal but dizziness was brought  on by examining neck ROM. Poor balance with gait assessment. RUE hyperreflexia.  Obtain MRI brain to r/o neuro component  Obtain MRI C-spine as dizziness and poor balance may be due to cervical component.    - call with results  Referral to balance PT                     Important to note, also  has a past medical history of Coronary artery disease, Hypertension, and SOB (shortness of breath).            The patient will return to clinic in 2 months after testing        All questions were answered and patient is comfortable with the plan.       Thank you very much for the opportunity to assist in this patient's care.    If you have any questions or concerns, please do not hesitate to contact me at any time.    Sincerely,     BEBETO De La Torre  [facility] [place]         I spent a total of 60 minutes on the day of the visit.This includes face to face time and non-face to face time preparing to see the patient (eg, review of tests), Obtaining and/or reviewing separately obtained history, Documenting clinical information in the electronic or other health record, Independently interpreting resultsand communicating results to the patient/family/caregiver, or Care coordination.

## 2022-02-01 ENCOUNTER — CLINICAL SUPPORT (OUTPATIENT)
Dept: CARDIOLOGY | Facility: HOSPITAL | Age: 72
End: 2022-02-01
Attending: INTERNAL MEDICINE
Payer: MEDICARE

## 2022-02-01 ENCOUNTER — HOSPITAL ENCOUNTER (OUTPATIENT)
Dept: RADIOLOGY | Facility: HOSPITAL | Age: 72
Discharge: HOME | End: 2022-02-01
Attending: INTERNAL MEDICINE
Payer: MEDICARE

## 2022-02-01 VITALS — HEIGHT: 66 IN | BODY MASS INDEX: 26.68 KG/M2 | WEIGHT: 166 LBS

## 2022-02-01 VITALS — WEIGHT: 167 LBS | BODY MASS INDEX: 26.84 KG/M2 | HEIGHT: 66 IN

## 2022-02-01 DIAGNOSIS — I34.0 NONRHEUMATIC MITRAL VALVE REGURGITATION: ICD-10-CM

## 2022-02-01 DIAGNOSIS — R07.9 CHEST PAIN WITH HIGH RISK OF ACUTE CORONARY SYNDROME: ICD-10-CM

## 2022-02-01 DIAGNOSIS — R06.02 SOB (SHORTNESS OF BREATH): ICD-10-CM

## 2022-02-01 LAB
ASCENDING AORTA: 2.76 CM
AV INDEX (PROSTH): 0.45
AV MEAN GRADIENT: 8 MMHG
AV PEAK GRADIENT: 16 MMHG
AV VALVE AREA: 1.57 CM2
AV VELOCITY RATIO: 0.47
BSA FOR ECHO PROCEDURE: 1.87 M2
CV ECHO LV RWT: 0.46 CM
CV PHARM DOSE: 0.4 MG
CV STRESS BASE HR: 73 BPM
DIASTOLIC BLOOD PRESSURE: 91 MMHG
DOP CALC AO PEAK VEL: 1.99 M/S
DOP CALC AO VTI: 44.11 CM
DOP CALC LVOT AREA: 3.5 CM2
DOP CALC LVOT DIAMETER: 2.12 CM
DOP CALC LVOT PEAK VEL: 0.93 M/S
DOP CALC LVOT STROKE VOLUME: 69.29 CM3
DOP CALC MV VTI: 65.98 CM
DOP CALCLVOT PEAK VEL VTI: 19.64 CM
E WAVE DECELERATION TIME: 181.73 MSEC
E/A RATIO: 1.63
E/E' RATIO: 41 M/S
ECHO LV POSTERIOR WALL: 1.01 CM (ref 0.6–1.1)
EJECTION FRACTION: 55 %
FRACTIONAL SHORTENING: 27 % (ref 28–44)
INTERVENTRICULAR SEPTUM: 0.94 CM (ref 0.6–1.1)
LA MAJOR: 5.8 CM
LA MINOR: 5.94 CM
LA WIDTH: 5.86 CM
LEFT ATRIUM SIZE: 4.5 CM
LEFT ATRIUM VOLUME INDEX: 71.1 ML/M2
LEFT ATRIUM VOLUME: 131.55 CM3
LEFT INTERNAL DIMENSION IN SYSTOLE: 3.23 CM (ref 2.1–4)
LEFT VENTRICLE DIASTOLIC VOLUME INDEX: 48.16 ML/M2
LEFT VENTRICLE DIASTOLIC VOLUME: 89.1 ML
LEFT VENTRICLE MASS INDEX: 78 G/M2
LEFT VENTRICLE SYSTOLIC VOLUME INDEX: 22.6 ML/M2
LEFT VENTRICLE SYSTOLIC VOLUME: 41.75 ML
LEFT VENTRICULAR INTERNAL DIMENSION IN DIASTOLE: 4.43 CM (ref 3.5–6)
LEFT VENTRICULAR MASS: 144.33 G
LV LATERAL E/E' RATIO: 34.17 M/S
LV SEPTAL E/E' RATIO: 51.25 M/S
MV A" WAVE DURATION": 9.13 MSEC
MV MEAN GRADIENT: 1 MMHG
MV PEAK A VEL: 1.26 M/S
MV PEAK E VEL: 2.05 M/S
MV PEAK GRADIENT: 19 MMHG
MV STENOSIS PRESSURE HALF TIME: 52.7 MS
MV VALVE AREA BY CONTINUITY EQUATION: 1.05 CM2
MV VALVE AREA P 1/2 METHOD: 4.17 CM2
NUC REST EJECTION FRACTION: 67
NUC STRESS EJECTION FRACTION: 69 %
OHS CV CPX 1 MINUTE RECOVERY HEART RATE: 90 BPM
OHS CV CPX 85 PERCENT MAX PREDICTED HEART RATE MALE: 121
OHS CV CPX MAX PREDICTED HEART RATE: 143
OHS CV CPX PATIENT IS FEMALE: 1
OHS CV CPX PATIENT IS MALE: 0
OHS CV CPX PEAK DIASTOLIC BLOOD PRESSURE: 91 MMHG
OHS CV CPX PEAK HEAR RATE: 92 BPM
OHS CV CPX PEAK RATE PRESSURE PRODUCT: NORMAL
OHS CV CPX PEAK SYSTOLIC BLOOD PRESSURE: 142 MMHG
OHS CV CPX PERCENT MAX PREDICTED HEART RATE ACHIEVED: 64
OHS CV CPX RATE PRESSURE PRODUCT PRESENTING: NORMAL
OHS CV PHARM TIME: 1341 MIN
PISA MRMAX VEL: 0.05 M/S
PISA RADIUS: 1.08 CM
PISA TR MAX VEL: 4.18 M/S
PISA TR VN NYQUIST: 0 M/S
PULM VEIN S/D RATIO: 0.76
PV PEAK D VEL: 0.68 M/S
PV PEAK S VEL: 0.52 M/S
RA MAJOR: 4.95 CM
RA PRESSURE: 8 MMHG
RA WIDTH: 3.83 CM
RIGHT VENTRICULAR END-DIASTOLIC DIMENSION: 3.64 CM
RV TISSUE DOPPLER FREE WALL SYSTOLIC VELOCITY 1 (APICAL 4 CHAMBER VIEW): 10.8 CM/S
SINUS: 2.39 CM
STJ: 2.08 CM
SYSTOLIC BLOOD PRESSURE: 142 MMHG
TDI LATERAL: 0.06 M/S
TDI SEPTAL: 0.04 M/S
TDI: 0.05 M/S
TR MAX PG: 70 MMHG
TRICUSPID ANNULAR PLANE SYSTOLIC EXCURSION: 2.04 CM
TV REST PULMONARY ARTERY PRESSURE: 78 MMHG

## 2022-02-01 PROCEDURE — 93017 CV STRESS TEST TRACING ONLY: CPT | Mod: PO

## 2022-02-01 PROCEDURE — 78452 STRESS TEST WITH MYOCARDIAL PERFUSION (CUPID ONLY): ICD-10-PCS | Mod: 26,,, | Performed by: INTERNAL MEDICINE

## 2022-02-01 PROCEDURE — 63600175 PHARM REV CODE 636 W HCPCS: Mod: PO | Performed by: INTERNAL MEDICINE

## 2022-02-01 PROCEDURE — 93016 STRESS TEST WITH MYOCARDIAL PERFUSION (CUPID ONLY): ICD-10-PCS | Mod: ,,, | Performed by: INTERNAL MEDICINE

## 2022-02-01 PROCEDURE — 93016 CV STRESS TEST SUPVJ ONLY: CPT | Mod: ,,, | Performed by: INTERNAL MEDICINE

## 2022-02-01 PROCEDURE — 93018 CV STRESS TEST I&R ONLY: CPT | Mod: ,,, | Performed by: INTERNAL MEDICINE

## 2022-02-01 PROCEDURE — A9502 TC99M TETROFOSMIN: HCPCS | Mod: PO

## 2022-02-01 PROCEDURE — 78452 HT MUSCLE IMAGE SPECT MULT: CPT | Mod: 26,,, | Performed by: INTERNAL MEDICINE

## 2022-02-01 PROCEDURE — 93306 TTE W/DOPPLER COMPLETE: CPT | Mod: PO

## 2022-02-01 PROCEDURE — 93018 PR CARDIAC STRESS TST,INTERP/REPT ONLY: ICD-10-PCS | Mod: ,,, | Performed by: INTERNAL MEDICINE

## 2022-02-01 RX ORDER — REGADENOSON 0.08 MG/ML
0.4 INJECTION, SOLUTION INTRAVENOUS ONCE
Status: COMPLETED | OUTPATIENT
Start: 2022-02-01 | End: 2022-02-01

## 2022-02-01 RX ADMIN — REGADENOSON 0.4 MG: 0.08 INJECTION, SOLUTION INTRAVENOUS at 01:02

## 2022-02-02 ENCOUNTER — TELEPHONE (OUTPATIENT)
Dept: GASTROENTEROLOGY | Facility: CLINIC | Age: 72
End: 2022-02-02
Payer: MEDICARE

## 2022-02-02 DIAGNOSIS — Z01.818 PRE-OP TESTING: ICD-10-CM

## 2022-02-02 NOTE — TELEPHONE ENCOUNTER
Pt scheduled for scope. Instructions mailed to home. Pt verbalized understanding of preop COVID test requirement.

## 2022-02-03 ENCOUNTER — HOSPITAL ENCOUNTER (OUTPATIENT)
Dept: RADIOLOGY | Facility: HOSPITAL | Age: 72
Discharge: HOME | End: 2022-02-03
Attending: NURSE PRACTITIONER
Payer: MEDICARE

## 2022-02-03 ENCOUNTER — TELEPHONE (OUTPATIENT)
Dept: CARDIOLOGY | Facility: CLINIC | Age: 72
End: 2022-02-03
Payer: MEDICARE

## 2022-02-03 ENCOUNTER — TELEPHONE (OUTPATIENT)
Dept: NEUROLOGY | Facility: CLINIC | Age: 72
End: 2022-02-03
Payer: MEDICARE

## 2022-02-03 DIAGNOSIS — R26.89 IMBALANCE: ICD-10-CM

## 2022-02-03 PROCEDURE — 70551 MRI BRAIN STEM W/O DYE: CPT | Mod: TC,PO

## 2022-02-03 PROCEDURE — 72141 MRI NECK SPINE W/O DYE: CPT | Mod: 26,,, | Performed by: RADIOLOGY

## 2022-02-03 PROCEDURE — 72141 MRI CERVICAL SPINE WITHOUT CONTRAST: ICD-10-PCS | Mod: 26,,, | Performed by: RADIOLOGY

## 2022-02-03 PROCEDURE — 70551 MRI BRAIN STEM W/O DYE: CPT | Mod: 26,,, | Performed by: RADIOLOGY

## 2022-02-03 PROCEDURE — 72141 MRI NECK SPINE W/O DYE: CPT | Mod: TC,PO

## 2022-02-03 PROCEDURE — 70551 MRI BRAIN WITHOUT CONTRAST: ICD-10-PCS | Mod: 26,,, | Performed by: RADIOLOGY

## 2022-02-03 NOTE — TELEPHONE ENCOUNTER
----- Message from ROMINA Cooley sent at 2/3/2022  3:00 PM CST -----  Please let the patient know the radiologist did not report any abnormality on her MRI brain scan.

## 2022-02-03 NOTE — TELEPHONE ENCOUNTER
Spoke with patient and informed or MRI results. Per Denisha Roberson radiologist did not see any abnormality in the brain scan. Patient voiced understanding.

## 2022-02-04 ENCOUNTER — TELEPHONE (OUTPATIENT)
Dept: CARDIOLOGY | Facility: CLINIC | Age: 72
End: 2022-02-04
Payer: MEDICARE

## 2022-02-04 ENCOUNTER — TELEPHONE (OUTPATIENT)
Dept: NEUROLOGY | Facility: CLINIC | Age: 72
End: 2022-02-04
Payer: MEDICARE

## 2022-02-04 DIAGNOSIS — M48.02 CERVICAL STENOSIS OF SPINE: ICD-10-CM

## 2022-02-04 DIAGNOSIS — R26.89 IMBALANCE: Primary | ICD-10-CM

## 2022-02-04 NOTE — TELEPHONE ENCOUNTER
----- Message from ROMINA Cooley sent at 2/4/2022  4:19 PM CST -----  Please inform the patient that her cervical MRI scan does report some mild-moderate spinal canal stenosis (narrowing) which could be contributing to her symptoms. I would like for her to see [facility] for formal eval. I'll plce referral.   Her MRI scan also reports an incidental finding of a small thyroid nodule. This needs to be followed up by an ultrasound by her PCP. Please inform PCP as well.

## 2022-02-04 NOTE — TELEPHONE ENCOUNTER
----- Message from Cuong Hinkle MD sent at 2/3/2022  4:51 PM CST -----  Abnormal stress and echo with severe MR needs appointment to be moved to next week next Wednesday and vocal with

## 2022-02-07 ENCOUNTER — TELEPHONE (OUTPATIENT)
Dept: NEUROSURGERY | Facility: CLINIC | Age: 72
End: 2022-02-07
Payer: MEDICARE

## 2022-02-07 NOTE — TELEPHONE ENCOUNTER
Called patient to schedule NP appointment per referral from Denisha Roberson.  Patient declined.  Patient stated she wanted something closer to home.

## 2022-02-09 ENCOUNTER — OFFICE VISIT (OUTPATIENT)
Dept: CARDIOLOGY | Facility: CLINIC | Age: 72
End: 2022-02-09
Payer: MEDICARE

## 2022-02-09 VITALS
SYSTOLIC BLOOD PRESSURE: 131 MMHG | WEIGHT: 163.81 LBS | DIASTOLIC BLOOD PRESSURE: 69 MMHG | BODY MASS INDEX: 26.33 KG/M2 | HEIGHT: 66 IN | HEART RATE: 68 BPM

## 2022-02-09 DIAGNOSIS — I34.0 SEVERE MITRAL REGURGITATION: ICD-10-CM

## 2022-02-09 DIAGNOSIS — I27.20 PULMONARY HYPERTENSION: ICD-10-CM

## 2022-02-09 DIAGNOSIS — Z03.818 ENCNTR FOR OBS FOR SUSP EXPSR TO OTH BIOLG AGENTS RULED OUT: Primary | ICD-10-CM

## 2022-02-09 DIAGNOSIS — R94.39 ABNORMAL NUCLEAR STRESS TEST: Primary | ICD-10-CM

## 2022-02-09 DIAGNOSIS — R06.02 SOB (SHORTNESS OF BREATH): Chronic | ICD-10-CM

## 2022-02-09 DIAGNOSIS — R07.2 PRECORDIAL PAIN: Chronic | ICD-10-CM

## 2022-02-09 DIAGNOSIS — E66.3 OVERWEIGHT (BMI 25.0-29.9): Chronic | ICD-10-CM

## 2022-02-09 PROCEDURE — 3288F PR FALLS RISK ASSESSMENT DOCUMENTED: ICD-10-PCS | Mod: CPTII,S$GLB,, | Performed by: INTERNAL MEDICINE

## 2022-02-09 PROCEDURE — 1126F AMNT PAIN NOTED NONE PRSNT: CPT | Mod: CPTII,S$GLB,, | Performed by: INTERNAL MEDICINE

## 2022-02-09 PROCEDURE — 3288F FALL RISK ASSESSMENT DOCD: CPT | Mod: CPTII,S$GLB,, | Performed by: INTERNAL MEDICINE

## 2022-02-09 PROCEDURE — 3008F BODY MASS INDEX DOCD: CPT | Mod: CPTII,S$GLB,, | Performed by: INTERNAL MEDICINE

## 2022-02-09 PROCEDURE — 1126F PR PAIN SEVERITY QUANTIFIED, NO PAIN PRESENT: ICD-10-PCS | Mod: CPTII,S$GLB,, | Performed by: INTERNAL MEDICINE

## 2022-02-09 PROCEDURE — 3078F DIAST BP <80 MM HG: CPT | Mod: CPTII,S$GLB,, | Performed by: INTERNAL MEDICINE

## 2022-02-09 PROCEDURE — 1159F MED LIST DOCD IN RCRD: CPT | Mod: CPTII,S$GLB,, | Performed by: INTERNAL MEDICINE

## 2022-02-09 PROCEDURE — 1101F PT FALLS ASSESS-DOCD LE1/YR: CPT | Mod: CPTII,S$GLB,, | Performed by: INTERNAL MEDICINE

## 2022-02-09 PROCEDURE — 1159F PR MEDICATION LIST DOCUMENTED IN MEDICAL RECORD: ICD-10-PCS | Mod: CPTII,S$GLB,, | Performed by: INTERNAL MEDICINE

## 2022-02-09 PROCEDURE — 3075F SYST BP GE 130 - 139MM HG: CPT | Mod: CPTII,S$GLB,, | Performed by: INTERNAL MEDICINE

## 2022-02-09 PROCEDURE — 3008F PR BODY MASS INDEX (BMI) DOCUMENTED: ICD-10-PCS | Mod: CPTII,S$GLB,, | Performed by: INTERNAL MEDICINE

## 2022-02-09 PROCEDURE — 3078F PR MOST RECENT DIASTOLIC BLOOD PRESSURE < 80 MM HG: ICD-10-PCS | Mod: CPTII,S$GLB,, | Performed by: INTERNAL MEDICINE

## 2022-02-09 PROCEDURE — 1101F PR PT FALLS ASSESS DOC 0-1 FALLS W/OUT INJ PAST YR: ICD-10-PCS | Mod: CPTII,S$GLB,, | Performed by: INTERNAL MEDICINE

## 2022-02-09 PROCEDURE — 3075F PR MOST RECENT SYSTOLIC BLOOD PRESS GE 130-139MM HG: ICD-10-PCS | Mod: CPTII,S$GLB,, | Performed by: INTERNAL MEDICINE

## 2022-02-09 PROCEDURE — 99214 PR OFFICE/OUTPT VISIT, EST, LEVL IV, 30-39 MIN: ICD-10-PCS | Mod: S$GLB,,, | Performed by: INTERNAL MEDICINE

## 2022-02-09 PROCEDURE — 99214 OFFICE O/P EST MOD 30 MIN: CPT | Mod: S$GLB,,, | Performed by: INTERNAL MEDICINE

## 2022-02-09 RX ORDER — NAPROXEN SODIUM 220 MG/1
81 TABLET, FILM COATED ORAL ONCE
Status: CANCELLED | OUTPATIENT
Start: 2022-02-09 | End: 2022-02-09

## 2022-02-09 RX ORDER — CLOPIDOGREL BISULFATE 75 MG/1
300 TABLET ORAL ONCE
Status: CANCELLED | OUTPATIENT
Start: 2022-02-09 | End: 2022-02-09

## 2022-02-09 RX ORDER — PRAVASTATIN SODIUM 40 MG/1
40 TABLET ORAL NIGHTLY
Qty: 90 TABLET | Refills: 0 | Status: SHIPPED | OUTPATIENT
Start: 2022-02-09 | End: 2022-04-12

## 2022-02-09 RX ORDER — SODIUM CHLORIDE 9 MG/ML
INJECTION, SOLUTION INTRAVENOUS ONCE
Status: CANCELLED | OUTPATIENT
Start: 2022-02-09 | End: 2022-02-09

## 2022-02-09 RX ORDER — ATENOLOL 50 MG/1
50 TABLET ORAL DAILY
Qty: 90 TABLET | Refills: 0 | Status: SHIPPED | OUTPATIENT
Start: 2022-02-09 | End: 2022-04-12

## 2022-02-09 NOTE — PATIENT INSTRUCTIONS
Angiogram   2/15/22 at 9 am     Arrive for procedure at: [facility]    You will receive a phone call from [facility] Pre-Op Department with further instructions prior to your scheduled procedure.    Notify the nurse if you are ALLERGIC TO IODINE.    FASTING: You MAY NOT have anything to eat or drink AFTER MIDNIGHT the day before your procedure. If your procedure is scheduled in the afternoon, you may have a LIGHT BREAKFAST 6-8 hours prior to your procedure.  For example: Two slices of toast; black coffee or black tea.    MEDICATIONS: You may take your regular morning medications with water. If there are any medications that you should not take, you will be instructed to hold them for that morning.    ? CARDIOLOGY PRE-PROCEDURE MEDICATION ORDERS:  ** Please hold any medications that are checked below:    HOLD   # OF DAYS TO HOLD  ? Coumadin   Consult with Coumadin Clinic   ? Xarelto    _DAY BEFORE & DAY OF_  ? Pradaxa  _ DAY BEFORE & DAY OF _  ? Eliquis   _ DAY BEFORE & DAY OF _  ? Metformin    Day before procedure & morning of procedure  ? Short acting insulin   Morning of procedure    CONTINUE the Following Medications   ? Plavix      ? Effient     ? Aspirin    WHAT TO EXPECT:    How long will the procedure take?  The procedure will take an average of 1 - 2 hours to perform.  After the procedure, you will need to lay flat for around 4 - 6 hours to minimize bleeding from the puncture site. If the wrist is accessed you will need to keep your arm still as instructed by the nurse.    When can I go home?  You may be able to be discharged home that same afternoon if there were no complications.  If you have one of the following: balloon; stent; pacemaker or defibrillator procedures, you may spend one night for observation.  Your doctor will determine your discharge based upon your progress.  The results of your procedure will be discussed with you before you are discharged.  Any further testing or  procedures will be scheduled for you either before you leave or you will be instructed to call for a future appointment.      TRANSPORTATION:  PLEASE ARRANGE TO HAVE SOMEONE DRIVE YOU HOME FOLLOWING YOUR PROCEDURE, YOU WILL NOT BE ALLOWED TO DRIVE.
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
## 2022-02-13 ENCOUNTER — LAB VISIT (OUTPATIENT)
Dept: FAMILY MEDICINE | Facility: CLINIC | Age: 72
End: 2022-02-13
Payer: MEDICARE

## 2022-02-13 DIAGNOSIS — Z03.818 ENCNTR FOR OBS FOR SUSP EXPSR TO OTH BIOLG AGENTS RULED OUT: ICD-10-CM

## 2022-02-13 PROCEDURE — U0005 INFEC AGEN DETEC AMPLI PROBE: HCPCS | Performed by: INTERNAL MEDICINE

## 2022-02-13 PROCEDURE — U0003 INFECTIOUS AGENT DETECTION BY NUCLEIC ACID (DNA OR RNA); SEVERE ACUTE RESPIRATORY SYNDROME CORONAVIRUS 2 (SARS-COV-2) (CORONAVIRUS DISEASE [COVID-19]), AMPLIFIED PROBE TECHNIQUE, MAKING USE OF HIGH THROUGHPUT TECHNOLOGIES AS DESCRIBED BY CMS-2020-01-R: HCPCS | Performed by: INTERNAL MEDICINE

## 2022-02-14 ENCOUNTER — TELEPHONE (OUTPATIENT)
Dept: NEUROSURGERY | Facility: CLINIC | Age: 72
End: 2022-02-14
Payer: MEDICARE

## 2022-02-14 LAB
SARS-COV-2 RNA RESP QL NAA+PROBE: NOT DETECTED
SARS-COV-2- CYCLE NUMBER: NORMAL

## 2022-02-14 NOTE — TELEPHONE ENCOUNTER
Called patient to schedule NP appointment per referral.  Patient declined stating that she is scheduled closer to home.

## 2022-02-15 ENCOUNTER — TELEPHONE (OUTPATIENT)
Dept: CARDIOLOGY | Facility: CLINIC | Age: 72
End: 2022-02-15
Payer: MEDICARE

## 2022-02-15 DIAGNOSIS — I34.0 MITRAL REGURGITATION: ICD-10-CM

## 2022-02-15 DIAGNOSIS — I34.0 SEVERE MITRAL REGURGITATION: ICD-10-CM

## 2022-02-15 DIAGNOSIS — Z03.818 ENCNTR FOR OBS FOR SUSP EXPSR TO OTH BIOLG AGENTS RULED OUT: Primary | ICD-10-CM

## 2022-02-18 ENCOUNTER — TELEPHONE (OUTPATIENT)
Dept: VASCULAR SURGERY | Facility: CLINIC | Age: 72
End: 2022-02-18
Payer: MEDICARE

## 2022-02-18 NOTE — TELEPHONE ENCOUNTER
Patient is having a procedure with Dr. Hinkle on 3/3/22 and a colonoscopy on 3/15/22. She would like to make appointment after these are completed.

## 2022-02-25 ENCOUNTER — TELEPHONE (OUTPATIENT)
Dept: CARDIOLOGY | Facility: CLINIC | Age: 72
End: 2022-02-25
Payer: MEDICARE

## 2022-02-25 NOTE — TELEPHONE ENCOUNTER
----- Message from Angelique Bliss sent at 2/25/2022  1:36 PM CST -----  Regarding: advice  Contact: self  Type: Needs Medical Advice  Who Called:  self  Symptoms (please be specific):    How long has patient had these symptoms:    Pharmacy name and phone #:    Best Call Back Number: [phone]  Additional Information: Patient want to know if she need to follow up with the doctor.

## 2022-03-09 ENCOUNTER — TELEPHONE (OUTPATIENT)
Dept: GASTROENTEROLOGY | Facility: CLINIC | Age: 72
End: 2022-03-09
Payer: MEDICARE

## 2022-03-09 DIAGNOSIS — Z03.818 ENCNTR FOR OBS FOR SUSP EXPSR TO OTH BIOLG AGENTS RULED OUT: Primary | ICD-10-CM

## 2022-03-09 DIAGNOSIS — I34.0 MITRAL REGURGITATION: ICD-10-CM

## 2022-03-09 DIAGNOSIS — I34.0 SEVERE MITRAL REGURGITATION: ICD-10-CM

## 2022-03-09 NOTE — TELEPHONE ENCOUNTER
Spoke with pt. Reviewed prep instructions, reminded of covid test on Saturday. Pt verbalized understanding.

## 2022-03-09 NOTE — TELEPHONE ENCOUNTER
----- Message from Darby Dukes sent at 3/9/2022  1:15 PM CST -----  Regarding: advice  Contact: NIHARIKA HARKINS [[id number]]  Caller: NIHARIKA HARKINS [[id number]]  Phone: [phone]  Nature of call: caller requesting prep instructions for the upcoming colonoscopy.   Please call upon request.  Thank you!

## 2022-03-10 ENCOUNTER — TELEPHONE (OUTPATIENT)
Dept: CARDIOLOGY | Facility: CLINIC | Age: 72
End: 2022-03-10
Payer: MEDICARE

## 2022-03-10 NOTE — TELEPHONE ENCOUNTER
----- Message from Eleonora Fam LPN sent at 2/28/2022  9:16 AM CST -----  Contact: Ms. Hull @ [facility] Direct# [phone]    ----- Message -----  From: Melina Keenan  Sent: 2/28/2022   9:02 AM CST  To: Manan TESFAYE Staff    Ms. Hull would like a call back in regards to her wanting to speak with you about scheduling test for the patient please.

## 2022-03-10 NOTE — TELEPHONE ENCOUNTER
JAYANT 3/24/22 AT 10 AM   Arrive for your procedure at:  [facility]      ? FASTING:  You MAY NOT have anything to eat or drink AFTER MIDNIGHT.  If your procedure is scheduled in the afternoon, you may have a LIGHT BREAKFAST BEFORE 6:00 A.M.  For example: Two slices of toast; black coffee or black tea.    ? MEDICATIONS:  You may take your regular morning medications with a small sip of water.     Hold or adjust the following:   Fluid pills.   Diabetes medications.    Continue: Coumadin, Plavix, Effient, Aspirin, Anti-coagulants, Blood thinners    Please refer to pre-op instructions received from [facility].

## 2022-03-15 ENCOUNTER — TELEPHONE (OUTPATIENT)
Dept: FAMILY MEDICINE | Facility: CLINIC | Age: 72
End: 2022-03-15
Payer: MEDICARE

## 2022-03-15 ENCOUNTER — ANESTHESIA (OUTPATIENT)
Dept: ENDOSCOPY | Facility: HOSPITAL | Age: 72
End: 2022-03-15
Payer: MEDICARE

## 2022-03-15 ENCOUNTER — HOSPITAL ENCOUNTER (OUTPATIENT)
Facility: HOSPITAL | Age: 72
Discharge: HOME | End: 2022-03-15
Attending: INTERNAL MEDICINE | Admitting: INTERNAL MEDICINE
Payer: MEDICARE

## 2022-03-15 ENCOUNTER — ANESTHESIA EVENT (OUTPATIENT)
Dept: ENDOSCOPY | Facility: HOSPITAL | Age: 72
End: 2022-03-15
Payer: MEDICARE

## 2022-03-15 DIAGNOSIS — Z12.11 SCREEN FOR COLON CANCER: ICD-10-CM

## 2022-03-15 PROCEDURE — D9220A PRA ANESTHESIA: ICD-10-PCS | Mod: PT,ANES,, | Performed by: ANESTHESIOLOGY

## 2022-03-15 PROCEDURE — 45380 PR COLONOSCOPY,BIOPSY: ICD-10-PCS | Mod: 59,PT,, | Performed by: INTERNAL MEDICINE

## 2022-03-15 PROCEDURE — 88305 TISSUE EXAM BY PATHOLOGIST: CPT | Mod: 26,,, | Performed by: PATHOLOGY

## 2022-03-15 PROCEDURE — 27201089 HC SNARE, DISP (ANY): Mod: PO | Performed by: INTERNAL MEDICINE

## 2022-03-15 PROCEDURE — D9220A PRA ANESTHESIA: ICD-10-PCS | Mod: PT,CRNA,, | Performed by: NURSE ANESTHETIST, CERTIFIED REGISTERED

## 2022-03-15 PROCEDURE — 45380 COLONOSCOPY AND BIOPSY: CPT | Mod: PT,59,PO | Performed by: INTERNAL MEDICINE

## 2022-03-15 PROCEDURE — D9220A PRA ANESTHESIA: Mod: PT,CRNA,, | Performed by: NURSE ANESTHETIST, CERTIFIED REGISTERED

## 2022-03-15 PROCEDURE — 45385 PR COLONOSCOPY,REMV LESN,SNARE: ICD-10-PCS | Mod: PT,,, | Performed by: INTERNAL MEDICINE

## 2022-03-15 PROCEDURE — 45385 COLONOSCOPY W/LESION REMOVAL: CPT | Mod: PT,,, | Performed by: INTERNAL MEDICINE

## 2022-03-15 PROCEDURE — 25000003 PHARM REV CODE 250: Mod: PO | Performed by: NURSE ANESTHETIST, CERTIFIED REGISTERED

## 2022-03-15 PROCEDURE — 88305 TISSUE EXAM BY PATHOLOGIST: ICD-10-PCS | Mod: 26,,, | Performed by: PATHOLOGY

## 2022-03-15 PROCEDURE — 27201012 HC FORCEPS, HOT/COLD, DISP: Mod: PO | Performed by: INTERNAL MEDICINE

## 2022-03-15 PROCEDURE — 88305 TISSUE EXAM BY PATHOLOGIST: CPT | Performed by: PATHOLOGY

## 2022-03-15 PROCEDURE — 37000009 HC ANESTHESIA EA ADD 15 MINS: Mod: PO | Performed by: INTERNAL MEDICINE

## 2022-03-15 PROCEDURE — 63600175 PHARM REV CODE 636 W HCPCS: Mod: PO | Performed by: NURSE ANESTHETIST, CERTIFIED REGISTERED

## 2022-03-15 PROCEDURE — D9220A PRA ANESTHESIA: Mod: PT,ANES,, | Performed by: ANESTHESIOLOGY

## 2022-03-15 PROCEDURE — 37000008 HC ANESTHESIA 1ST 15 MINUTES: Mod: PO | Performed by: INTERNAL MEDICINE

## 2022-03-15 PROCEDURE — 45385 COLONOSCOPY W/LESION REMOVAL: CPT | Mod: PT,PO | Performed by: INTERNAL MEDICINE

## 2022-03-15 PROCEDURE — 25000003 PHARM REV CODE 250: Mod: PO | Performed by: INTERNAL MEDICINE

## 2022-03-15 PROCEDURE — 45380 COLONOSCOPY AND BIOPSY: CPT | Mod: 59,PT,, | Performed by: INTERNAL MEDICINE

## 2022-03-15 PROCEDURE — 63600175 PHARM REV CODE 636 W HCPCS: Mod: PO | Performed by: INTERNAL MEDICINE

## 2022-03-15 RX ORDER — PROPOFOL 10 MG/ML
VIAL (ML) INTRAVENOUS
Status: DISCONTINUED | OUTPATIENT
Start: 2022-03-15 | End: 2022-03-15

## 2022-03-15 RX ORDER — LIDOCAINE HYDROCHLORIDE 10 MG/ML
1 INJECTION INFILTRATION; PERINEURAL ONCE
Status: COMPLETED | OUTPATIENT
Start: 2022-03-15 | End: 2022-03-15

## 2022-03-15 RX ORDER — PHENYLEPHRINE HYDROCHLORIDE 10 MG/ML
INJECTION INTRAVENOUS
Status: DISCONTINUED | OUTPATIENT
Start: 2022-03-15 | End: 2022-03-15

## 2022-03-15 RX ORDER — SODIUM CHLORIDE 0.9 % (FLUSH) 0.9 %
10 SYRINGE (ML) INJECTION
Status: DISCONTINUED | OUTPATIENT
Start: 2022-03-15 | End: 2022-03-15 | Stop reason: HOSPADM

## 2022-03-15 RX ORDER — SODIUM CHLORIDE, SODIUM LACTATE, POTASSIUM CHLORIDE, CALCIUM CHLORIDE 600; 310; 30; 20 MG/100ML; MG/100ML; MG/100ML; MG/100ML
INJECTION, SOLUTION INTRAVENOUS CONTINUOUS
Status: DISCONTINUED | OUTPATIENT
Start: 2022-03-15 | End: 2022-03-15 | Stop reason: HOSPADM

## 2022-03-15 RX ORDER — LIDOCAINE HCL/PF 100 MG/5ML
SYRINGE (ML) INTRAVENOUS
Status: DISCONTINUED | OUTPATIENT
Start: 2022-03-15 | End: 2022-03-15

## 2022-03-15 RX ADMIN — PHENYLEPHRINE HYDROCHLORIDE 100 MCG: 10 INJECTION, SOLUTION INTRAMUSCULAR; INTRAVENOUS; SUBCUTANEOUS at 09:03

## 2022-03-15 RX ADMIN — PROPOFOL 25 MG: 10 INJECTION, EMULSION INTRAVENOUS at 08:03

## 2022-03-15 RX ADMIN — SODIUM CHLORIDE, SODIUM LACTATE, POTASSIUM CHLORIDE, AND CALCIUM CHLORIDE: .6; .31; .03; .02 INJECTION, SOLUTION INTRAVENOUS at 08:03

## 2022-03-15 RX ADMIN — PROPOFOL 25 MG: 10 INJECTION, EMULSION INTRAVENOUS at 09:03

## 2022-03-15 RX ADMIN — LIDOCAINE HYDROCHLORIDE 1 ML: 10 INJECTION, SOLUTION EPIDURAL; INFILTRATION; INTRACAUDAL; PERINEURAL at 08:03

## 2022-03-15 RX ADMIN — PHENYLEPHRINE HYDROCHLORIDE 100 MCG: 10 INJECTION, SOLUTION INTRAMUSCULAR; INTRAVENOUS; SUBCUTANEOUS at 08:03

## 2022-03-15 RX ADMIN — PROPOFOL 100 MG: 10 INJECTION, EMULSION INTRAVENOUS at 08:03

## 2022-03-15 RX ADMIN — LIDOCAINE HYDROCHLORIDE 100 MG: 20 INJECTION, SOLUTION INTRAVENOUS at 08:03

## 2022-03-15 NOTE — TRANSFER OF CARE
"Anesthesia Transfer of Care Note    Patient: Aleida Bliss    Procedure(s) Performed: Procedure(s) (LRB):  COLONOSCOPY (N/A)    Patient location: PACU    Anesthesia Type: general    Transport from OR: Transported from OR on room air with adequate spontaneous ventilation    Post pain: adequate analgesia    Post assessment: no apparent anesthetic complications and tolerated procedure well    Post vital signs: stable    Level of consciousness: awake    Nausea/Vomiting: no nausea/vomiting    Complications: none    Transfer of care protocol was followed      Last vitals:   Visit Vitals  /73 (BP Location: Right arm, Patient Position: Lying)   Pulse (!) 51   Temp 36.8 °C (98.2 °F) (Skin)   Resp 18   Ht 5' 6" (1.676 m)   Wt 72.6 kg (160 lb)   SpO2 99%   Breastfeeding No   BMI 25.82 kg/m²     "

## 2022-03-15 NOTE — H&P
History & Physical - Short Stay  Gastroenterology      SUBJECTIVE:     Procedure: Colonoscopy    Chief Complaint/Indication for Procedure: Screening    PTA Medications   Medication Sig    atenoloL (TENORMIN) 50 MG tablet Take 1 tablet (50 mg total) by mouth once daily.    ergocalciferol (ERGOCALCIFEROL) 50,000 unit Cap Take 50,000 Units by mouth every 7 days.    fluticasone propionate (FLONASE) 50 mcg/actuation nasal spray 2 sprays (100 mcg total) by Each Nostril route once daily.    hydrALAZINE (APRESOLINE) 25 MG tablet Take 1 tablet (25 mg total) by mouth every 8 (eight) hours. (Patient taking differently: Take 25 mg by mouth 3 (three) times daily.)    omeprazole (PRILOSEC) 40 MG capsule Take 1 capsule by mouth once daily.    potassium chloride (KLOR-CON) 10 MEQ TbSR Take 1 tablet (10 mEq total) by mouth once daily.    pravastatin (PRAVACHOL) 40 MG tablet Take 1 tablet (40 mg total) by mouth every evening.    sucralfate (CARAFATE) 1 gram tablet Take 1 g by mouth every evening.    aspirin 81 MG Chew Take 81 mg by mouth once daily.    nitroGLYCERIN (NITROSTAT) 0.4 MG SL tablet Place 1 tablet (0.4 mg total) under the tongue every 5 (five) minutes as needed for Chest pain.    umeclidinium-vilanteroL (ANORO ELLIPTA) 62.5-25 mcg/actuation DsDv Inhale 1 puff into the lungs as needed.       Review of patient's allergies indicates:   Allergen Reactions    Lisinopril Other (See Comments)     Other reaction(s): Cough      Losartan Other (See Comments)     Other reaction(s): Unknown      Cyclobenzaprine Rash        Past Medical History:   Diagnosis Date    Arthritis     Coronary artery disease     GERD (gastroesophageal reflux disease)     Hypertension     SOB (shortness of breath)      Past Surgical History:   Procedure Laterality Date    CATARACT EXTRACTION EXTRACAPSULAR W/ INTRAOCULAR LENS IMPLANTATION      CATHETERIZATION OF BOTH LEFT AND RIGHT HEART N/A 2/15/2022    Procedure: CATHETERIZATION, HEART,  BOTH LEFT AND RIGHT;  Surgeon: Cuong Hinkle MD;  [facility]: [facility];  Service: Cardiology;  Laterality: N/A;    CHOLECYSTECTOMY      HYSTERECTOMY       Family History   Problem Relation Age of Onset    Heart disease Mother     Cancer Father         prostate     Social History     Tobacco Use    Smoking status: Never Smoker    Smokeless tobacco: Never Used   Substance Use Topics    Alcohol use: No    Drug use: No         OBJECTIVE:     Vital Signs (Most Recent)       Physical Exam                                                        GENERAL:  Comfortable, in no acute distress.                                 HEENT EXAM:  Nonicteric.  No adenopathy.  Oropharynx is clear.               NECK:  Supple.                                                               LUNGS:  Clear.                                                               CARDIAC:  Regular rate and rhythm.  S1, S2.  No murmur.                      ABDOMEN:  Soft, positive bowel sounds, nontender.  No hepatosplenomegaly or masses.  No rebound or guarding.                                             EXTREMITIES:  No edema.     MENTAL STATUS:  Normal, alert and oriented.      ASSESSMENT/PLAN:     Assessment: Colorectal cancer screening    Plan: Colonoscopy    Anesthesia Plan: General    ASA Grade: ASA 2 - Patient with mild systemic disease with no functional limitations    MALLAMPATI SCORE:  I (soft palate, uvula, fauces, and tonsillar pillars visible)

## 2022-03-15 NOTE — ANESTHESIA POSTPROCEDURE EVALUATION
Anesthesia Post Evaluation    Patient: Aleida Bliss    Procedure(s) Performed: Procedure(s) (LRB):  COLONOSCOPY (N/A)    Final Anesthesia Type: general      Patient location during evaluation: PACU  Patient participation: Yes- Able to Participate  Level of consciousness: awake and alert, oriented and awake  Post-procedure vital signs: reviewed and stable  Pain management: adequate  Airway patency: patent    PONV status at discharge: No PONV  Anesthetic complications: no      Cardiovascular status: blood pressure returned to baseline and hemodynamically stable  Respiratory status: unassisted, spontaneous ventilation and room air  Hydration status: euvolemic  Follow-up not needed.          Vitals Value Taken Time   /77 03/15/22 0932   Temp  03/15/22 1023   Pulse 68 03/15/22 0932   Resp 13 03/15/22 0932   SpO2 98 % 03/15/22 0932         Event Time   Out of Recovery 09:44:00         Pain/Brien Score: Brien Score: 10 (3/15/2022  9:31 AM)

## 2022-03-15 NOTE — TELEPHONE ENCOUNTER
----- Message from Sebastian Montiel III, PA-C sent at 3/15/2022  7:50 AM CDT -----  Aleida Bliss    The screening mammogram shows no evidence of malignancy. Recommend to repeat the scan in one year per guidelines.

## 2022-03-15 NOTE — ANESTHESIA PREPROCEDURE EVALUATION
03/15/2022  Aleida Bliss is a 72 y.o., female.      Pre-op Assessment    I have reviewed the Patient Summary Reports.     I have reviewed the Nursing Notes.       Review of Systems  Anesthesia Hx:  No problems with previous Anesthesia    Cardiovascular:   Hypertension CAD      Pulmonary:   Shortness of breath    Hepatic/GI:   GERD        Physical Exam  General: Well nourished        Anesthesia Plan  Type of Anesthesia, risks & benefits discussed:    Anesthesia Type: Gen Natural Airway  Intra-op Monitoring Plan: Standard ASA Monitors  Induction:  IV  Informed Consent: Informed consent signed with the Patient and all parties understand the risks and agree with anesthesia plan.  All questions answered.   ASA Score: 4  Day of Surgery Review of History & Physical: H&P Update referred to the surgeon/provider.    Ready For Surgery From Anesthesia Perspective.     .

## 2022-03-15 NOTE — DISCHARGE SUMMARY
[place] - Endoscopy  Discharge Note  Short Stay    Discharge Note  Short Stay      SUMMARY     Admit Date: 3/15/2022    Attending Physician: Varun Toro MD     Discharge Physician: Varun Toro MD    Discharge Date: 3/15/2022 9:11 AM    Final Diagnosis: Screening [Z13.9]    Disposition: HOME OR SELF CARE    Patient Instructions:   Current Discharge Medication List      CONTINUE these medications which have NOT CHANGED    Details   atenoloL (TENORMIN) 50 MG tablet Take 1 tablet (50 mg total) by mouth once daily.  Qty: 90 tablet, Refills: 0    Comments: .      ergocalciferol (ERGOCALCIFEROL) 50,000 unit Cap Take 50,000 Units by mouth every 7 days.      fluticasone propionate (FLONASE) 50 mcg/actuation nasal spray 2 sprays (100 mcg total) by Each Nostril route once daily.  Qty: 3 g, Refills: 3      hydrALAZINE (APRESOLINE) 25 MG tablet Take 1 tablet (25 mg total) by mouth every 8 (eight) hours.  Qty: 90 tablet, Refills: 3      omeprazole (PRILOSEC) 40 MG capsule Take 1 capsule by mouth once daily.      potassium chloride (KLOR-CON) 10 MEQ TbSR Take 1 tablet (10 mEq total) by mouth once daily.  Qty: 90 tablet, Refills: 1      pravastatin (PRAVACHOL) 40 MG tablet Take 1 tablet (40 mg total) by mouth every evening.  Qty: 90 tablet, Refills: 0      sucralfate (CARAFATE) 1 gram tablet Take 1 g by mouth every evening.      aspirin 81 MG Chew Take 81 mg by mouth once daily.      nitroGLYCERIN (NITROSTAT) 0.4 MG SL tablet Place 1 tablet (0.4 mg total) under the tongue every 5 (five) minutes as needed for Chest pain.  Qty: 25 tablet, Refills: 0    Associated Diagnoses: Chest pain with high risk of acute coronary syndrome      umeclidinium-vilanteroL (ANORO ELLIPTA) 62.5-25 mcg/actuation DsDv Inhale 1 puff into the lungs as needed.             Discharge Procedure Orders (must include Diet, Follow-up, Activity)    Follow Up:  Follow up with PCP as previously scheduled  Resume routine diet.  Activity as tolerated.     No driving day of procedure.

## 2022-03-15 NOTE — [employer] PATIENT INSTRUCTIONS
Discharge Summary/Instructions after an Endoscopic Procedure  Patient Name: Aleida Bliss  Patient MRN: [id number]  Patient YOB: 1950  Tuesday, March 15, 2022  Varun Toro MD  Dear patient,  As a result of recent federal legislation (The Federal Cures Act), you may   receive lab or pathology results from your procedure in your [facility]   account before your physician is able to contact you. Your physician or   their representative will relay the results to you with their   recommendations at their soonest availability.  Thank you,  RESTRICTIONS:  During your procedure today, you received medications for sedation.  These   medications may affect your judgment, balance and coordination.  Therefore,   for 24 hours, you have the following restrictions:   - DO NOT drive a car, operate machinery, make legal/financial decisions,   sign important papers or drink alcohol.    ACTIVITY:  Today: no heavy lifting, straining or running due to procedural   sedation/anesthesia.  The following day: return to full activity including work.  DIET:  Eat and drink normally unless instructed otherwise.     TREATMENT FOR COMMON SIDE EFFECTS:  - Mild abdominal pain, nausea, belching, bloating or excessive gas:  rest,   eat lightly and use a heating pad.  - Sore Throat: treat with throat lozenges and/or gargle with warm salt   water.  - Because air was used during the procedure, expelling large amounts of air   from your rectum or belching is normal.  - If a bowel prep was taken, you may not have a bowel movement for 1-3 days.    This is normal.  SYMPTOMS TO WATCH FOR AND REPORT TO YOUR PHYSICIAN:  1. Abdominal pain or bloating, other than gas cramps.  2. Chest pain.  3. Back pain.  4. Signs of infection such as: chills or fever occurring within 24 hours   after the procedure.  5. Rectal bleeding, which would show as bright red, maroon, or black stools.   (A tablespoon of blood from the rectum is not serious, especially  if   hemorrhoids are present.)  6. Vomiting.  7. Weakness or dizziness.  GO DIRECTLY TO THE NEAREST EMERGENCY ROOM IF YOU HAVE ANY OF THE FOLLOWING:      Difficulty breathing              Chills and/or fever over 101 F   Persistent vomiting and/or vomiting blood   Severe abdominal pain   Severe chest pain   Black, tarry stools   Bleeding- more than one tablespoon   Any other symptom or condition that you feel may need urgent attention  Your doctor recommends these additional instructions:  If any biopsies were taken, your doctors clinic will contact you in 1 to 2   weeks with any results.  We are waiting for your pathology results.   Your physician has recommended a repeat colonoscopy in five years for   surveillance based on pathology results.   You are being discharged to home.  For questions, problems or results please call your physician - Varun Toro MD at Work:  [phone].  EMERGENCY PHONE NUMBER: [phone], LAB RESULTS: [phone]  IF A COMPLICATION OR EMERGENCY SITUATION ARISES AND YOU ARE UNABLE TO REACH   YOUR PHYSICIAN - GO DIRECTLY TO THE EMERGENCY ROOM.  ___________________________________________  Nurse Signature  ___________________________________________  Patient/Designated Responsible Party Signature  Varun Toro MD  3/15/2022 9:11:01 AM  This report has been verified and signed electronically.  Dear patient,  As a result of recent federal legislation (The Federal Cures Act), you may   receive lab or pathology results from your procedure in your [facility]   account before your physician is able to contact you. Your physician or   their representative will relay the results to you with their   recommendations at their soonest availability.  Thank you.  [employer]ATION

## 2022-03-16 VITALS
HEART RATE: 68 BPM | DIASTOLIC BLOOD PRESSURE: 77 MMHG | WEIGHT: 160 LBS | SYSTOLIC BLOOD PRESSURE: 143 MMHG | BODY MASS INDEX: 25.71 KG/M2 | TEMPERATURE: 98 F | HEIGHT: 66 IN | OXYGEN SATURATION: 98 % | RESPIRATION RATE: 13 BRPM

## 2022-03-21 LAB
FINAL PATHOLOGIC DIAGNOSIS: NORMAL
Lab: NORMAL

## 2022-03-28 ENCOUNTER — TELEPHONE (OUTPATIENT)
Dept: NEUROLOGY | Facility: CLINIC | Age: 72
End: 2022-03-28
Payer: MEDICARE

## 2022-03-28 NOTE — TELEPHONE ENCOUNTER
Call placed to patient to confirm appointment with patient and advised if she is continuing to do her balance therapy as instructed by Denisha Roberson.  Patient stated she went once and did not need to go back.  Patient stated that Dr. Hinkle took pressure off of her lungs and since that procedure is no longer off balance.  Patient stopped balance therapy altogether and stated she no longer needs her walker and she is taking her time with walking.  Patient advised to inform Denisha Roberson she is is doing much better.

## 2022-03-30 ENCOUNTER — OFFICE VISIT (OUTPATIENT)
Dept: CARDIOLOGY | Facility: CLINIC | Age: 72
End: 2022-03-30
Payer: MEDICARE

## 2022-03-30 VITALS
SYSTOLIC BLOOD PRESSURE: 138 MMHG | BODY MASS INDEX: 27.03 KG/M2 | HEIGHT: 66 IN | HEART RATE: 82 BPM | WEIGHT: 168.19 LBS | DIASTOLIC BLOOD PRESSURE: 79 MMHG

## 2022-03-30 DIAGNOSIS — I25.10 CORONARY ARTERY DISEASE INVOLVING NATIVE CORONARY ARTERY OF NATIVE HEART WITHOUT ANGINA PECTORIS: Chronic | ICD-10-CM

## 2022-03-30 DIAGNOSIS — I27.20 SEVERE PULMONARY HYPERTENSION: Primary | ICD-10-CM

## 2022-03-30 DIAGNOSIS — E66.3 OVERWEIGHT (BMI 25.0-29.9): Chronic | ICD-10-CM

## 2022-03-30 DIAGNOSIS — I34.0 MODERATE TO SEVERE MITRAL REGURGITATION: Chronic | ICD-10-CM

## 2022-03-30 PROCEDURE — 3075F PR MOST RECENT SYSTOLIC BLOOD PRESS GE 130-139MM HG: ICD-10-PCS | Mod: CPTII,S$GLB,, | Performed by: INTERNAL MEDICINE

## 2022-03-30 PROCEDURE — 1101F PT FALLS ASSESS-DOCD LE1/YR: CPT | Mod: CPTII,S$GLB,, | Performed by: INTERNAL MEDICINE

## 2022-03-30 PROCEDURE — 99214 OFFICE O/P EST MOD 30 MIN: CPT | Mod: S$GLB,,, | Performed by: INTERNAL MEDICINE

## 2022-03-30 PROCEDURE — 1126F PR PAIN SEVERITY QUANTIFIED, NO PAIN PRESENT: ICD-10-PCS | Mod: CPTII,S$GLB,, | Performed by: INTERNAL MEDICINE

## 2022-03-30 PROCEDURE — 3078F PR MOST RECENT DIASTOLIC BLOOD PRESSURE < 80 MM HG: ICD-10-PCS | Mod: CPTII,S$GLB,, | Performed by: INTERNAL MEDICINE

## 2022-03-30 PROCEDURE — 99214 PR OFFICE/OUTPT VISIT, EST, LEVL IV, 30-39 MIN: ICD-10-PCS | Mod: S$GLB,,, | Performed by: INTERNAL MEDICINE

## 2022-03-30 PROCEDURE — 3288F PR FALLS RISK ASSESSMENT DOCUMENTED: ICD-10-PCS | Mod: CPTII,S$GLB,, | Performed by: INTERNAL MEDICINE

## 2022-03-30 PROCEDURE — 3075F SYST BP GE 130 - 139MM HG: CPT | Mod: CPTII,S$GLB,, | Performed by: INTERNAL MEDICINE

## 2022-03-30 PROCEDURE — 3288F FALL RISK ASSESSMENT DOCD: CPT | Mod: CPTII,S$GLB,, | Performed by: INTERNAL MEDICINE

## 2022-03-30 PROCEDURE — 1159F PR MEDICATION LIST DOCUMENTED IN MEDICAL RECORD: ICD-10-PCS | Mod: CPTII,S$GLB,, | Performed by: INTERNAL MEDICINE

## 2022-03-30 PROCEDURE — 3078F DIAST BP <80 MM HG: CPT | Mod: CPTII,S$GLB,, | Performed by: INTERNAL MEDICINE

## 2022-03-30 PROCEDURE — 3008F PR BODY MASS INDEX (BMI) DOCUMENTED: ICD-10-PCS | Mod: CPTII,S$GLB,, | Performed by: INTERNAL MEDICINE

## 2022-03-30 PROCEDURE — 1126F AMNT PAIN NOTED NONE PRSNT: CPT | Mod: CPTII,S$GLB,, | Performed by: INTERNAL MEDICINE

## 2022-03-30 PROCEDURE — 3008F BODY MASS INDEX DOCD: CPT | Mod: CPTII,S$GLB,, | Performed by: INTERNAL MEDICINE

## 2022-03-30 PROCEDURE — 1101F PR PT FALLS ASSESS DOC 0-1 FALLS W/OUT INJ PAST YR: ICD-10-PCS | Mod: CPTII,S$GLB,, | Performed by: INTERNAL MEDICINE

## 2022-03-30 PROCEDURE — 1159F MED LIST DOCD IN RCRD: CPT | Mod: CPTII,S$GLB,, | Performed by: INTERNAL MEDICINE

## 2022-03-30 RX ORDER — SILDENAFIL CITRATE 20 MG/1
20 TABLET ORAL 3 TIMES DAILY
Qty: 90 TABLET | Refills: 5 | OUTPATIENT
Start: 2022-03-30 | End: 2022-04-19 | Stop reason: SDUPTHER

## 2022-03-30 NOTE — PROGRESS NOTES
"Subjective:    Patient ID:  Aleida Bliss is a 72 y.o. female who presents for No chief complaint on file.        HPI    STATUS POST RIGHT AND LEFT HEART CATHETERIZATION IN [place], HAS CAD, MODERATE SINGLE-VESSEL DISCUSSED FINDINGS WITH THE PATIENT AND HER , , MOD TO SEVERE MR, PAP 80"S DOWN TO 40'S WITH NITRATES, SEE ROS  Past Medical History:   Diagnosis Date    Arthritis     Coronary artery disease     GERD (gastroesophageal reflux disease)     Hypertension     SOB (shortness of breath)      Past Surgical History:   Procedure Laterality Date    CATARACT EXTRACTION EXTRACAPSULAR W/ INTRAOCULAR LENS IMPLANTATION      CATHETERIZATION OF BOTH LEFT AND RIGHT HEART N/A 02/15/2022    Procedure: CATHETERIZATION, HEART, BOTH LEFT AND RIGHT;  Surgeon: Cuong Hinkle MD;  Location: [facility] CATH;  Service: Cardiology;  Laterality: N/A;    CHOLECYSTECTOMY      COLONOSCOPY      COLONOSCOPY N/A 3/15/2022    Procedure: COLONOSCOPY;  Surgeon: Varun Toro MD;  Location: [facility] ENDO;  Service: Endoscopy;  Laterality: N/A;    HYSTERECTOMY       Family History   Problem Relation Age of Onset    Heart disease Mother     Cancer Father         prostate     Social History     Socioeconomic History    Marital status:    Tobacco Use    Smoking status: Never Smoker    Smokeless tobacco: Never Used   Substance and Sexual Activity    Alcohol use: No    Drug use: No    Sexual activity: Not Currently       Review of patient's allergies indicates:   Allergen Reactions    Lisinopril Other (See Comments)     Other reaction(s): Cough      Losartan Other (See Comments)     Other reaction(s): Unknown      Cyclobenzaprine Rash       Current Outpatient Medications:     atenoloL (TENORMIN) 50 MG tablet, Take 1 tablet (50 mg total) by mouth once daily., Disp: 90 tablet, Rfl: 0    ergocalciferol (ERGOCALCIFEROL) 50,000 unit Cap, Take 50,000 Units by mouth every Monday., Disp: , Rfl:     fluticasone propionate " (FLONASE) 50 mcg/actuation nasal spray, 2 sprays (100 mcg total) by Each Nostril route once daily. (Patient taking differently: 2 sprays by Each Nostril route daily as needed.), Disp: 3 g, Rfl: 3    hydrALAZINE (APRESOLINE) 25 MG tablet, Take 1 tablet (25 mg total) by mouth every 8 (eight) hours. (Patient taking differently: Take 25 mg by mouth 3 (three) times daily.), Disp: 90 tablet, Rfl: 3    omeprazole (PRILOSEC) 40 MG capsule, Take 1 capsule by mouth once daily., Disp: , Rfl:     potassium chloride (KLOR-CON) 10 MEQ TbSR, Take 1 tablet (10 mEq total) by mouth once daily., Disp: 90 tablet, Rfl: 1    pravastatin (PRAVACHOL) 40 MG tablet, Take 1 tablet (40 mg total) by mouth every evening., Disp: 90 tablet, Rfl: 0    sucralfate (CARAFATE) 1 gram tablet, Take 1 g by mouth every evening., Disp: , Rfl:     umeclidinium-vilanteroL (ANORO ELLIPTA) 62.5-25 mcg/actuation DsDv, Inhale 1 puff into the lungs as needed., Disp: , Rfl:     sildenafil (REVATIO) 20 mg Tab, Take 1 tablet (20 mg total) by mouth 3 (three) times daily., Disp: 90 tablet, Rfl: 5    Review of Systems   Constitutional: Negative for chills, decreased appetite, diaphoresis, fever, malaise/fatigue and night sweats.   HENT: Negative for congestion and nosebleeds.    Eyes: Negative for blurred vision and visual disturbance.   Cardiovascular: Positive for dyspnea on exertion. Negative for chest pain, claudication, cyanosis, irregular heartbeat, leg swelling, near-syncope, orthopnea, palpitations, paroxysmal nocturnal dyspnea and syncope.   Respiratory: Positive for shortness of breath. Negative for cough, hemoptysis and wheezing.    Hematologic/Lymphatic: Negative for adenopathy. Does not bruise/bleed easily.   Skin: Negative for color change and itching.   Musculoskeletal: Positive for arthritis. Negative for back pain and falls.   Gastrointestinal: Negative for abdominal pain, change in bowel habit, dysphagia, jaundice, melena and nausea.  "  Genitourinary: Negative for dysuria and flank pain.   Neurological: Negative for brief paralysis, focal weakness, headaches, light-headedness, loss of balance (BETTER) and weakness.   Psychiatric/Behavioral: Negative for altered mental status, depression and memory loss.        Objective:      Vitals:    03/30/22 1014   BP: 138/79   Pulse: 82   Weight: 76.3 kg (168 lb 3.4 oz)   Height: 5' 6" (1.676 m)   PainSc: 0-No pain     Body mass index is 27.15 kg/m².    Physical Exam  Constitutional:       Appearance: Normal appearance. She is overweight.   HENT:      Head: Normocephalic and atraumatic.   Eyes:      Conjunctiva/sclera: Conjunctivae normal.      Pupils: Pupils are equal, round, and reactive to light.   Neck:      Thyroid: No thyromegaly.      Vascular: Normal carotid pulses. No carotid bruit, hepatojugular reflux or JVD.      Trachea: Trachea normal. No tracheal deviation.   Cardiovascular:      Rate and Rhythm: Normal rate and regular rhythm.  No extrasystoles are present.     Pulses:           Carotid pulses are 2+ on the right side and 2+ on the left side.       Radial pulses are 2+ on the right side and 2+ on the left side.        Femoral pulses are 2+ on the right side and 2+ on the left side.       Posterior tibial pulses are 2+ on the right side and 2+ on the left side.      Heart sounds: Murmur heard.    Holosystolic murmur is present with a grade of 2/6 at the lower left sternal border and apex.    No friction rub. No gallop.   Pulmonary:      Effort: Pulmonary effort is normal.      Breath sounds: Normal breath sounds. No rales.   Abdominal:      General: Bowel sounds are normal.      Palpations: Abdomen is soft.      Tenderness: There is no abdominal tenderness.   Musculoskeletal:         General: Normal range of motion.      Cervical back: Neck supple. No edema or erythema.      Right lower leg: No edema.      Left lower leg: No edema.   Skin:     General: Skin is warm and dry.      Capillary " Refill: Capillary refill takes less than 2 seconds.      Findings: No rash.   Neurological:      General: No focal deficit present.      Mental Status: She is alert and oriented to person, place, and time.      Cranial Nerves: No cranial nerve deficit.      Motor: No tremor or abnormal muscle tone.   Psychiatric:         Mood and Affect: Mood normal.         Speech: Speech normal.         Behavior: Behavior normal.                 ..    Chemistry        Component Value Date/Time     02/15/2022 0818    K 4.3 02/15/2022 0818     02/15/2022 0818    CO2 28 02/15/2022 0818    BUN 13 02/15/2022 0818    CREATININE 0.80 02/15/2022 0818     02/15/2022 0818        Component Value Date/Time    CALCIUM 11.2 (H) 02/15/2022 0818    ALKPHOS 73 03/05/2018 1025    AST 25 03/05/2018 1025    ALT 32 03/05/2018 1025    BILITOT 0.6 03/05/2018 1025    ESTGFRAFRICA >60 02/15/2022 0818    EGFRNONAA >60 02/15/2022 0818            ..No results found for: CHOL  No results found for: HDL  No results found for: LDLCALC  No results found for: TRIG  No results found for: CHOLHDL  ..  Lab Results   Component Value Date    WBC 4.89 02/15/2022    HGB 13.6 02/15/2022    HCT 43.3 02/15/2022    MCV 89 02/15/2022     02/15/2022       Test(s) Reviewed  I have reviewed the following in detail:  [] Stress test   [x] Angiography   [x] Echocardiogram   [x] Labs   [] Other:       Assessment:         ICD-10-CM ICD-9-CM   1. Severe pulmonary hypertension  I27.20 416.8   2. Moderate to severe mitral regurgitation  I34.0 424.0   3. Coronary artery disease involving native coronary artery of native heart without angina pectoris  I25.10 414.01   4. Overweight (BMI 25.0-29.9)  E66.3 278.02     Problem List Items Addressed This Visit        Cardiac/Vascular    Moderate to severe mitral regurgitation    Coronary artery disease involving native coronary artery of native heart without angina pectoris       Endocrine    Overweight (BMI 25.0-29.9)        Other    Severe pulmonary hypertension - Primary    Relevant Medications    sildenafil (REVATIO) 20 mg Tab           Plan:         ADD REVATIO, CONSIDER MVR.  PATIENT NOT TOO KEEN NOW WILL TREAT  PULMONARY HYPERTENSION 1ST , NO OVERT HEART FAILURE NO TIA TYPE SYMPTOMS NO ARRHYTHMIAS SHE HAD SIGNIFICANT LEFT ATRIAL ENLARGEMENT, NO ANGINA NEEDS DIET EXERCISE WEIGHT LOSS, RETURN TO CLINIC IN 3 MO WITH   Severe pulmonary hypertension  Comments:  START MEDS  Orders:  -     sildenafil (REVATIO) 20 mg Tab; Take 1 tablet (20 mg total) by mouth 3 (three) times daily.  Dispense: 90 tablet; Refill: 5    Moderate to severe mitral regurgitation  Comments:  MONITOR    Coronary artery disease involving native coronary artery of native heart without angina pectoris  Comments:  STABLE    Overweight (BMI 25.0-29.9)  Comments:  WEIGHT LOSS    RTC Low level/low impact aerobic exercise 5x's/wk. Heart healthy diet and risk factor modification.    See labs and med orders.    Aerobic exercise 5x's/wk. Heart healthy diet and risk factor modification.    See labs and med orders.

## 2022-04-19 DIAGNOSIS — I27.20 SEVERE PULMONARY HYPERTENSION: ICD-10-CM

## 2022-04-19 RX ORDER — SILDENAFIL CITRATE 20 MG/1
20 TABLET ORAL 3 TIMES DAILY
Qty: 90 TABLET | Refills: 2 | Status: SHIPPED | OUTPATIENT
Start: 2022-04-19 | End: 2022-06-29 | Stop reason: SDUPTHER

## 2022-04-19 NOTE — TELEPHONE ENCOUNTER
----- Message from Nicole Lyle sent at 4/19/2022 12:55 PM CDT -----  Type:  Patient Call Back    Who Called: Aleida     What is the reqeust in detail: Pt is requesting a call back regarding her sildenafil (REVATIO) 20 mg Tab she states that she never received it. Please Advise    Can the clinic reply by [facility]?    Best Call Back Number: [phone]

## 2022-04-20 RX ORDER — ATENOLOL 50 MG/1
TABLET ORAL
Qty: 90 TABLET | Refills: 0 | Status: SHIPPED | OUTPATIENT
Start: 2022-04-20 | End: 2022-07-28 | Stop reason: SDUPTHER

## 2022-04-20 RX ORDER — PRAVASTATIN SODIUM 40 MG/1
TABLET ORAL
Qty: 90 TABLET | Refills: 0 | Status: SHIPPED | OUTPATIENT
Start: 2022-04-20 | End: 2022-09-26

## 2022-04-21 NOTE — TELEPHONE ENCOUNTER
----- Message from Margi Chaudhary sent at 4/21/2022  1:17 PM CDT -----  Regarding: PA needed  Name of Who is Calling: Aleida           What is the request in detail: Patient is calling to have a PA sent over to [id number]  [place]  for the sildenafil.           Can the clinic reply by [facility]: No           What Number to Call Back if not in [facility]: [phone]

## 2022-06-29 ENCOUNTER — OFFICE VISIT (OUTPATIENT)
Dept: CARDIOLOGY | Facility: CLINIC | Age: 72
End: 2022-06-29
Payer: MEDICARE

## 2022-06-29 VITALS
HEIGHT: 66 IN | WEIGHT: 166 LBS | BODY MASS INDEX: 26.68 KG/M2 | DIASTOLIC BLOOD PRESSURE: 62 MMHG | HEART RATE: 71 BPM | SYSTOLIC BLOOD PRESSURE: 128 MMHG

## 2022-06-29 DIAGNOSIS — I27.20 SEVERE PULMONARY HYPERTENSION: ICD-10-CM

## 2022-06-29 DIAGNOSIS — I25.118 CORONARY ARTERY DISEASE OF NATIVE ARTERY OF NATIVE HEART WITH STABLE ANGINA PECTORIS: Chronic | ICD-10-CM

## 2022-06-29 DIAGNOSIS — R09.89 BRUIT OF LEFT CAROTID ARTERY: ICD-10-CM

## 2022-06-29 DIAGNOSIS — I27.20 SEVERE PULMONARY HYPERTENSION: Primary | Chronic | ICD-10-CM

## 2022-06-29 DIAGNOSIS — R94.30 ELEVATED LEFT VENTRICULAR END-DIASTOLIC PRESSURE (LVEDP): Chronic | ICD-10-CM

## 2022-06-29 DIAGNOSIS — E66.3 OVERWEIGHT (BMI 25.0-29.9): Chronic | ICD-10-CM

## 2022-06-29 DIAGNOSIS — I34.0 NONRHEUMATIC MITRAL VALVE REGURGITATION: ICD-10-CM

## 2022-06-29 PROCEDURE — 1126F AMNT PAIN NOTED NONE PRSNT: CPT | Mod: CPTII,S$GLB,, | Performed by: INTERNAL MEDICINE

## 2022-06-29 PROCEDURE — 99214 OFFICE O/P EST MOD 30 MIN: CPT | Mod: S$GLB,,, | Performed by: INTERNAL MEDICINE

## 2022-06-29 PROCEDURE — 3074F SYST BP LT 130 MM HG: CPT | Mod: CPTII,S$GLB,, | Performed by: INTERNAL MEDICINE

## 2022-06-29 PROCEDURE — 1101F PT FALLS ASSESS-DOCD LE1/YR: CPT | Mod: CPTII,S$GLB,, | Performed by: INTERNAL MEDICINE

## 2022-06-29 PROCEDURE — 3078F DIAST BP <80 MM HG: CPT | Mod: CPTII,S$GLB,, | Performed by: INTERNAL MEDICINE

## 2022-06-29 PROCEDURE — 3008F BODY MASS INDEX DOCD: CPT | Mod: CPTII,S$GLB,, | Performed by: INTERNAL MEDICINE

## 2022-06-29 PROCEDURE — 1126F PR PAIN SEVERITY QUANTIFIED, NO PAIN PRESENT: ICD-10-PCS | Mod: CPTII,S$GLB,, | Performed by: INTERNAL MEDICINE

## 2022-06-29 PROCEDURE — 3074F PR MOST RECENT SYSTOLIC BLOOD PRESSURE < 130 MM HG: ICD-10-PCS | Mod: CPTII,S$GLB,, | Performed by: INTERNAL MEDICINE

## 2022-06-29 PROCEDURE — 1159F MED LIST DOCD IN RCRD: CPT | Mod: CPTII,S$GLB,, | Performed by: INTERNAL MEDICINE

## 2022-06-29 PROCEDURE — 99214 PR OFFICE/OUTPT VISIT, EST, LEVL IV, 30-39 MIN: ICD-10-PCS | Mod: S$GLB,,, | Performed by: INTERNAL MEDICINE

## 2022-06-29 PROCEDURE — 3288F PR FALLS RISK ASSESSMENT DOCUMENTED: ICD-10-PCS | Mod: CPTII,S$GLB,, | Performed by: INTERNAL MEDICINE

## 2022-06-29 PROCEDURE — 1101F PR PT FALLS ASSESS DOC 0-1 FALLS W/OUT INJ PAST YR: ICD-10-PCS | Mod: CPTII,S$GLB,, | Performed by: INTERNAL MEDICINE

## 2022-06-29 PROCEDURE — 3078F PR MOST RECENT DIASTOLIC BLOOD PRESSURE < 80 MM HG: ICD-10-PCS | Mod: CPTII,S$GLB,, | Performed by: INTERNAL MEDICINE

## 2022-06-29 PROCEDURE — 3288F FALL RISK ASSESSMENT DOCD: CPT | Mod: CPTII,S$GLB,, | Performed by: INTERNAL MEDICINE

## 2022-06-29 PROCEDURE — 3008F PR BODY MASS INDEX (BMI) DOCUMENTED: ICD-10-PCS | Mod: CPTII,S$GLB,, | Performed by: INTERNAL MEDICINE

## 2022-06-29 PROCEDURE — 1159F PR MEDICATION LIST DOCUMENTED IN MEDICAL RECORD: ICD-10-PCS | Mod: CPTII,S$GLB,, | Performed by: INTERNAL MEDICINE

## 2022-06-29 RX ORDER — SILDENAFIL CITRATE 20 MG/1
20 TABLET ORAL 3 TIMES DAILY
Qty: 270 TABLET | Refills: 3 | Status: SHIPPED | OUTPATIENT
Start: 2022-06-29 | End: 2023-07-07 | Stop reason: SDUPTHER

## 2022-06-29 NOTE — PROGRESS NOTES
Subjective:    Patient ID:  Aleida Bliss is a 72 y.o. female who presents for Severe pulmonary hypertension        HPI  THINKS STARTED REVATIO, BREATHING BETTER, MILD ANGINA NO TIA TYPE SYMPTOMS NO NEAR-SYNCOPE,, SEE ROS    Past Medical History:   Diagnosis Date    Arthritis     Coronary artery disease     GERD (gastroesophageal reflux disease)     Hypertension     SOB (shortness of breath)      Past Surgical History:   Procedure Laterality Date    CATARACT EXTRACTION EXTRACAPSULAR W/ INTRAOCULAR LENS IMPLANTATION      CATHETERIZATION OF BOTH LEFT AND RIGHT HEART N/A 02/15/2022    Procedure: CATHETERIZATION, HEART, BOTH LEFT AND RIGHT;  Surgeon: Cuong Hinkle MD;  [email or web address] C[facility];  Service: Cardiology;  Laterality: N/A;    CHOLECYSTECTOMY      COLONOSCOPY      COLONOSCOPY N/A 3/15/2022    Procedure: COLONOSCOPY;  Surgeon: Varun Toro MD;  Location: [facility] ENDO;  Service: Endoscopy;  Laterality: N/A;    HYSTERECTOMY       Family History   Problem Relation Age of Onset    Heart disease Mother     Cancer Father         prostate     Social History     Socioeconomic History    Marital status:    Tobacco Use    Smoking status: Never Smoker    Smokeless tobacco: Never Used   Substance and Sexual Activity    Alcohol use: No    Drug use: No    Sexual activity: Not Currently       Review of patient's allergies indicates:   Allergen Reactions    Lisinopril Other (See Comments)     Other reaction(s): Cough      Losartan Other (See Comments)     Other reaction(s): Unknown      Cyclobenzaprine Rash       Current Outpatient Medications:     atenoloL (TENORMIN) 50 MG tablet, TAKE 1 TABLET BY MOUTH EVERY DAY, Disp: 90 tablet, Rfl: 0    ergocalciferol (ERGOCALCIFEROL) 50,000 unit Cap, Take 50,000 Units by mouth every Monday., Disp: , Rfl:     fluticasone propionate (FLONASE) 50 mcg/actuation nasal spray, 2 sprays (100 mcg total) by Each Nostril route once daily. (Patient taking  differently: 2 sprays by Each Nostril route daily as needed.), Disp: 3 g, Rfl: 3    hydrALAZINE (APRESOLINE) 25 MG tablet, Take 1 tablet (25 mg total) by mouth every 8 (eight) hours. (Patient taking differently: Take 25 mg by mouth 3 (three) times daily.), Disp: 90 tablet, Rfl: 3    omeprazole (PRILOSEC) 40 MG capsule, Take 1 capsule by mouth once daily., Disp: , Rfl:     potassium chloride (KLOR-CON) 10 MEQ TbSR, Take 1 tablet (10 mEq total) by mouth once daily., Disp: 90 tablet, Rfl: 1    pravastatin (PRAVACHOL) 40 MG tablet, TAKE 1 TABLET BY MOUTH EVERY DAY IN THE EVENING, Disp: 90 tablet, Rfl: 0    sucralfate (CARAFATE) 1 gram tablet, Take 1 g by mouth every evening., Disp: , Rfl:     umeclidinium-vilanteroL (ANORO ELLIPTA) 62.5-25 mcg/actuation DsDv, Inhale 1 puff into the lungs as needed., Disp: , Rfl:     sildenafil (REVATIO) 20 mg Tab, Take 1 tablet (20 mg total) by mouth 3 (three) times daily., Disp: 270 tablet, Rfl: 3    Review of Systems   Constitutional: Negative for chills, decreased appetite, diaphoresis, fever, malaise/fatigue and night sweats.   HENT: Negative for congestion and nosebleeds.    Eyes: Negative for blurred vision (SOME) and visual disturbance.   Cardiovascular: Positive for dyspnea on exertion (MILD). Negative for chest pain (MILD EXERTIONAL ANGINA), claudication, cyanosis, irregular heartbeat, leg swelling, near-syncope, orthopnea, palpitations, paroxysmal nocturnal dyspnea and syncope.   Respiratory: Negative for cough, hemoptysis, shortness of breath and wheezing.    Hematologic/Lymphatic: Negative for adenopathy. Does not bruise/bleed easily.   Skin: Negative for color change and itching.   Musculoskeletal: Negative for back pain and falls.   Gastrointestinal: Negative for abdominal pain, change in bowel habit, dysphagia, jaundice, melena and nausea.   Genitourinary: Negative for dysuria and flank pain.   Neurological: Negative for brief paralysis, focal weakness, headaches,  "light-headedness, loss of balance (OCC) and weakness.   Psychiatric/Behavioral: Negative for altered mental status, depression and memory loss.        Objective:      Vitals:    06/29/22 1047 06/29/22 1113   BP: (!) 146/65 128/62   Pulse: 71    Weight: 75.3 kg (166 lb 0.1 oz)    Height: 5' 6" (1.676 m)    PainSc: 0-No pain      Body mass index is 26.79 kg/m².    Physical Exam  Constitutional:       Appearance: Normal appearance. She is overweight.   HENT:      Head: Normocephalic and atraumatic.   Eyes:      General: No scleral icterus.     Extraocular Movements: Extraocular movements intact.   Neck:      Thyroid: No thyromegaly.      Vascular: Normal carotid pulses. Carotid bruit present. No hepatojugular reflux or JVD.      Trachea: Trachea normal. No tracheal deviation.   Cardiovascular:      Rate and Rhythm: Normal rate and regular rhythm.  No extrasystoles are present.     Pulses:           Carotid pulses are 2+ on the right side and 2+ on the left side.       Radial pulses are 2+ on the right side and 2+ on the left side.        Posterior tibial pulses are 2+ on the right side and 2+ on the left side.      Heart sounds: Murmur heard.    Holosystolic murmur is present with a grade of 2/6 at the lower left sternal border.    No friction rub. No gallop. No S4 sounds.   Pulmonary:      Effort: Pulmonary effort is normal. No respiratory distress.      Breath sounds: Normal breath sounds. No rales.   Abdominal:      General: Bowel sounds are normal.      Palpations: Abdomen is soft.      Tenderness: There is no abdominal tenderness.   Musculoskeletal:         General: Normal range of motion.      Cervical back: Neck supple. No edema or erythema.      Right lower leg: No edema.      Left lower leg: No edema.   Skin:     General: Skin is warm and dry.      Capillary Refill: Capillary refill takes less than 2 seconds.      Findings: No rash.   Neurological:      General: No focal deficit present.      Mental Status: " She is alert and oriented to person, place, and time.      Motor: No tremor or abnormal muscle tone.   Psychiatric:         Mood and Affect: Mood normal.         Speech: Speech normal.         Behavior: Behavior normal.                 ..    Chemistry        Component Value Date/Time     02/15/2022 0818    K 4.3 02/15/2022 0818     02/15/2022 0818    CO2 28 02/15/2022 0818    BUN 13 02/15/2022 0818    CREATININE 0.80 02/15/2022 0818     02/15/2022 0818        Component Value Date/Time    CALCIUM 11.2 (H) 02/15/2022 0818    ALKPHOS 73 03/05/2018 1025    AST 25 03/05/2018 1025    ALT 32 03/05/2018 1025    BILITOT 0.6 03/05/2018 1025    ESTGFRAFRICA >60 02/15/2022 0818    EGFRNONAA >60 02/15/2022 0818            ..No results found for: CHOL  No results found for: HDL  No results found for: LDLCALC  No results found for: TRIG  No results found for: CHOLHDL  ..  Lab Results   Component Value Date    WBC 4.89 02/15/2022    HGB 13.6 02/15/2022    HCT 43.3 02/15/2022    MCV 89 02/15/2022     02/15/2022       Test(s) Reviewed  I have reviewed the following in detail:  [] Stress test   [] Angiography   [] Echocardiogram   [] Labs   [] Other:       Assessment:         ICD-10-CM ICD-9-CM   1. Severe pulmonary hypertension  I27.20 416.8   2. Coronary artery disease of native artery of native heart with stable angina pectoris  I25.118 414.01     413.9   3. Bruit of left carotid artery  R09.89 785.9   4. Overweight (BMI 25.0-29.9)  E66.3 278.02   5. Elevated left ventricular end-diastolic pressure (LVEDP)  R94.30 794.30   6. Nonrheumatic mitral valve regurgitation  I34.0 424.0   7. Severe pulmonary hypertension  I27.20 416.8     Problem List Items Addressed This Visit        Pulmonary    Severe pulmonary hypertension - Primary    Relevant Medications    sildenafil (REVATIO) 20 mg Tab    Other Relevant Orders    Echo       Cardiac/Vascular    Elevated left ventricular end-diastolic pressure (LVEDP)     Coronary artery disease of native artery of native heart with stable angina pectoris    Bruit of left carotid artery    Relevant Orders    CV Ultrasound Bilateral Doppler Carotid       Endocrine    Overweight (BMI 25.0-29.9)      Other Visit Diagnoses     Nonrheumatic mitral valve regurgitation        Relevant Orders    Echo           Plan:         REFRACTORY AGE OCCASION REGARDING DISEASE PROCESS AND PULMONARY HYPERTENSION NOT TOO MUCH INSIGHT INTO HER CONDITION, NO OVERT HEART FAILURE CLASS 1 ANGINA NO TIA TYPE SYMPTOMS NO NEAR-SYNCOPE NO ARRHYTHMIA DIET EXERCISE STAY WELL HYDRATED WEIGHT LOSS, RETURN TO CLINIC IN 3 MONTHS WITH LABS FROM PCP IF DONE, ECHO REASSESS THE PA PRESSURE ADDITION CAROTID ULTRASOUND  Severe pulmonary hypertension  -     Echo; Future; Expected date: 09/29/2022  -     sildenafil (REVATIO) 20 mg Tab; Take 1 tablet (20 mg total) by mouth 3 (three) times daily.  Dispense: 270 tablet; Refill: 3    Coronary artery disease of native artery of native heart with stable angina pectoris    Bruit of left carotid artery  Comments:  ULTRASOUND  Orders:  -     CV Ultrasound Bilateral Doppler Carotid; Future    Overweight (BMI 25.0-29.9)    Elevated left ventricular end-diastolic pressure (LVEDP)    Nonrheumatic mitral valve regurgitation  -     Echo; Future; Expected date: 09/29/2022    Severe pulmonary hypertension  Comments:  START MEDS  Orders:  -     Echo; Future; Expected date: 09/29/2022  -     sildenafil (REVATIO) 20 mg Tab; Take 1 tablet (20 mg total) by mouth 3 (three) times daily.  Dispense: 270 tablet; Refill: 3    RTC Low level/low impact aerobic exercise 5x's/wk. Heart healthy diet and risk factor modification.    See labs and med orders.    Aerobic exercise 5x's/wk. Heart healthy diet and risk factor modification.    See labs and med orders.

## 2022-06-30 ENCOUNTER — SPECIALTY PHARMACY (OUTPATIENT)
Dept: PHARMACY | Facility: CLINIC | Age: 72
End: 2022-06-30
Payer: MEDICARE

## 2022-06-30 DIAGNOSIS — I10 PRIMARY HYPERTENSION: Primary | ICD-10-CM

## 2022-06-30 NOTE — TELEPHONE ENCOUNTER
Sildenafil test claim - PA required.     Sildenafil PA approved. PA Case: [id number], Status: Approved, Coverage Starts on: 1/1/2022 12:00:00 AM, Coverage Ends on: 12/31/2022 12:00:00 AM.     Sildenafil test claim - $3.95 copay. No FA options. Benefits investigation not required ([employer] - LIS LVL 1).

## 2022-07-07 ENCOUNTER — SPECIALTY PHARMACY (OUTPATIENT)
Dept: PHARMACY | Facility: CLINIC | Age: 72
End: 2022-07-07
Payer: MEDICARE

## 2022-07-07 NOTE — TELEPHONE ENCOUNTER
Specialty Pharmacy - Initial Clinical Assessment    Specialty Medication Orders Linked to Encounter    Flowsheet Row Most Recent Value   Medication #1 sildenafil (REVATIO) 20 mg Tab (Order#[id number], Rx#[id number])        Patient Diagnosis   I10 - Primary hypertension    Subjective    Aleida Bliss is a 72 y.o. female, who is followed by the specialty pharmacy service for management and education.    Recent Encounters     Date Type Provider Description    07/07/2022 Specialty Pharmacy Joselyn Jensen PharmD Initial Clinical Assessment    06/30/2022 Specialty Pharmacy Joselyn Jensen PharmD Referral Authorization        Clinical call attempts since last clinical assessment   7/1/2022  4:10 PM - Specialty Pharmacy - Clinical Assessment by Joselyn Jensen PharmD     Current Outpatient Medications   Medication Sig    atenoloL (TENORMIN) 50 MG tablet TAKE 1 TABLET BY MOUTH EVERY DAY    ergocalciferol (ERGOCALCIFEROL) 50,000 unit Cap Take 50,000 Units by mouth every Monday.    fluticasone propionate (FLONASE) 50 mcg/actuation nasal spray 2 sprays (100 mcg total) by Each Nostril route once daily. (Patient taking differently: 2 sprays by Each Nostril route daily as needed.)    hydrALAZINE (APRESOLINE) 25 MG tablet Take 1 tablet (25 mg total) by mouth every 8 (eight) hours. (Patient taking differently: Take 25 mg by mouth 3 (three) times daily.)    omeprazole (PRILOSEC) 40 MG capsule Take 1 capsule by mouth once daily.    potassium chloride (KLOR-CON) 10 MEQ TbSR Take 1 tablet (10 mEq total) by mouth once daily.    pravastatin (PRAVACHOL) 40 MG tablet TAKE 1 TABLET BY MOUTH EVERY DAY IN THE EVENING    sildenafil (REVATIO) 20 mg Tab Take 1 tablet (20 mg total) by mouth 3 (three) times daily.    sucralfate (CARAFATE) 1 gram tablet Take 1 g by mouth every evening.    umeclidinium-vilanteroL (ANORO ELLIPTA) 62.5-25 mcg/actuation DsDv Inhale 1 puff into the lungs as needed.   Last reviewed on 7/7/2022  4:26 PM by  Joselyn Jensen, PharmD    Review of patient's allergies indicates:   Allergen Reactions    Lisinopril Other (See Comments)     Other reaction(s): Cough      Cyclobenzaprine Rash    Losartan Nausea Only     Other reaction(s): Unknown     Last reviewed on  7/7/2022 4:23 PM by Joselyn Jensen    Drug Interactions    Drug interactions evaluated: yes  Clinically relevant drug interactions identified: no  Provided the patient with educational material regarding drug interactions: not applicable         Adverse Effects    Shortness of breath: Pos       Assessment Questions - Documented Responses    Flowsheet Row Most Recent Value   Assessment    Medication Reconciliation completed for patient Yes   During the past 4 weeks, has patient missed any activities due to condition or medication? No   During the past 4 weeks, did patient have any of the following urgent care visits? None   Goals of Therapy Status Discussed (new start)   Status of the patients ability to self-administer: Is Able   All education points have been covered with patient? Yes, supplemental printed education provided   Welcome packet contents reviewed and discussed with patient? Yes   Assesment completed? Yes   Plan Therapy being initiated   Do you need to open a clinical intervention (i-vent)? No   Do you want to schedule first shipment? Yes        Refill Questions - Documented Responses    Flowsheet Row Most Recent Value   Patient Availability and HIPAA Verification    Does patient want to proceed with activity? Yes   HIPAA/medical authority confirmed? Yes   Relationship to patient of person spoken to? Self   Refill Screening Questions    When does the patient need to receive the medication? 07/11/22   Refill Delivery Questions    How will the patient receive the medication? Delivery Steffi   When does the patient need to receive the medication? 07/11/22   Shipping Address Home   Address in [facility] confirmed and updated if neccessary? Yes  "  Expected Copay ($) 3.95   Is the patient able to afford the medication copay? Yes   Payment Method invoice (approval required)  [Agreed to pay by money order.]   Days supply of Refill 90  [She knows to keep track and not lose medciation supply. [facility] will not be able to replaced med suply if lost or damaged.]   Supplies needed? No supplies needed   Refill activity completed? Yes   Refill activity plan Refill scheduled   Shipment/Pickup Date: 07/08/22  [Delivery 7/11/22.]          Objective    She has a past medical history of Arthritis, Coronary artery disease, GERD (gastroesophageal reflux disease), Hypertension, and SOB (shortness of breath).    Tried/failed medications: NONE    BP Readings from Last 4 Encounters:   06/29/22 128/62   03/30/22 138/79   03/24/22 (!) 125/52   03/15/22 (P) 131/75     Ht Readings from Last 4 Encounters:   06/29/22 5' 6" (1.676 m)   03/30/22 5' 6" (1.676 m)   03/23/22 5' 6" (1.676 m)   03/11/22 5' 6" (1.676 m)     Wt Readings from Last 4 Encounters:   06/29/22 75.3 kg (166 lb 0.1 oz)   03/30/22 76.3 kg (168 lb 3.4 oz)   03/23/22 73.5 kg (162 lb)   03/11/22 72.6 kg (160 lb)       The goals of prescribed drug therapy management include:  · Supporting patient to meet the prescriber's medical treatment objectives  · Improving or maintaining quality of life  · Maintaining optimal therapy adherence  · Minimizing and managing side effects      Goals of Therapy Status: Discussed (new start)    Assessment/Plan  Patient plans to start therapy on 07/11/22      Indication, dosage, appropriateness, effectiveness, safety and convenience of her specialty medication(s) were reviewed today.     Patient Education   Patient received education on the following:    Expectations and possible outcomes of therapy   Proper use, timely administration, and missed dose management   Duration of therapy   Side effects, including prevention, minimization, and management   Contraindications and safety " precautions   New or changed medications, including prescribe and over the counter medications and supplements   Reviews recommended vaccinations, as appropriate   Storage, safe handling, and disposal    Revatio 20mg - Take 1 tablet by mouth TID with or without food.  Counseling was reviewed:  1. Patient MUST take Revatio at the SAME time every day.  2. Side effects include diarrhea, flushing, headache, belly pain or heartburn, upset stomach, stuffy nose, runny nose, muscle pain.   Tell your doctor or get medical help if: signs of allergic reaction, chest pain or pressure or fast heartbeat, dizziness/fainting, very bad headache, goughing up blood, very upset stomach or throwing up, weakness on 1 side of the body, changes in eyesight or hearing, ringing in the ears, memory loss, seizures, shortness f breath, swelling of hands or feet.     DDI: Medication list reviewed and potential DDIs addressed. No DDIs or allergies noted. Patient MUST contact myself or provider prior to starting any new OTC, herbal, or prescription drugs to avoid potential DDIs.    Storage:   - Tablets: Room temperature    Patient plans to start Revatio on 7/11/22.     Tasks added this encounter   9/28/2022 - Refill Call (Auto Added)   Tasks due within next 3 months   No tasks due.     Joselyn Jensen, PharmD  [facility]  [address] [place] [id number]  Phone: [phone]  Fax: [phone]

## 2022-07-21 RX ORDER — HYDRALAZINE HYDROCHLORIDE 25 MG/1
25 TABLET, FILM COATED ORAL EVERY 8 HOURS
Qty: 90 TABLET | Refills: 3 | OUTPATIENT
Start: 2022-07-21

## 2022-07-21 RX ORDER — OMEPRAZOLE 40 MG/1
40 CAPSULE, DELAYED RELEASE ORAL DAILY
OUTPATIENT
Start: 2022-07-21

## 2022-07-21 RX ORDER — POTASSIUM CHLORIDE 750 MG/1
10 TABLET, EXTENDED RELEASE ORAL DAILY
Qty: 90 TABLET | Refills: 1 | OUTPATIENT
Start: 2022-07-21

## 2022-07-21 NOTE — TELEPHONE ENCOUNTER
----- Message from Rena Bray sent at 7/21/2022 11:11 AM CDT -----  Contact: patient  Type:  Patient Call          Who Called:Patient         Does the patient know what this is regarding?: pt calling for refills on RX hydrALAZINE (APRESOLINE) 25 MG tablet ,omeprazole (PRILOSEC) 40 MG ,hydroCHLOROthiazide (MICROZIDE) 12.5 mg capsule  (haven't been filled in some time, potassium chloride (KLOR-CON) 10 MEQ TbSR also a vitamin D2  1.25 mg  /50 that's not in the chart ; pT staets she changed pharmacy ; please advise           Would the patient rather a call back or a response via [facility]?call           Best Call Back Number:[phone]             Additional Information:        [facility] (Now [facility]) - [place], [place] - [address]  [address] [facility] [address]  Phone: [phone] Fax: [phone]

## 2022-07-25 ENCOUNTER — TELEPHONE (OUTPATIENT)
Dept: FAMILY MEDICINE | Facility: CLINIC | Age: 72
End: 2022-07-25
Payer: MEDICARE

## 2022-07-25 NOTE — TELEPHONE ENCOUNTER
----- Message from Page Weston sent at 7/25/2022  2:20 PM CDT -----  Contact: self  Type: Patient Returning Call        Who Called: patient  Who Left Message for Patient: no  Does the patient know what this is regarding?: no  Best Call Back Number: [phone] (home)   Additional Information: Pt wants to speak with a nurse about medication. She needs to be seen by ROHIT Montiel before getting medication.       lying in bed laying in bed

## 2022-07-28 ENCOUNTER — OFFICE VISIT (OUTPATIENT)
Dept: FAMILY MEDICINE | Facility: CLINIC | Age: 72
End: 2022-07-28
Payer: MEDICARE

## 2022-07-28 VITALS
SYSTOLIC BLOOD PRESSURE: 138 MMHG | WEIGHT: 170.75 LBS | DIASTOLIC BLOOD PRESSURE: 88 MMHG | BODY MASS INDEX: 27.56 KG/M2 | HEART RATE: 69 BPM | TEMPERATURE: 98 F | OXYGEN SATURATION: 98 %

## 2022-07-28 DIAGNOSIS — I10 PRIMARY HYPERTENSION: ICD-10-CM

## 2022-07-28 DIAGNOSIS — I27.20 SEVERE PULMONARY HYPERTENSION: Primary | ICD-10-CM

## 2022-07-28 DIAGNOSIS — E55.9 VITAMIN D DEFICIENCY DISEASE: ICD-10-CM

## 2022-07-28 DIAGNOSIS — K21.9 GASTROESOPHAGEAL REFLUX DISEASE, UNSPECIFIED WHETHER ESOPHAGITIS PRESENT: ICD-10-CM

## 2022-07-28 DIAGNOSIS — J30.2 SEASONAL ALLERGIES: ICD-10-CM

## 2022-07-28 DIAGNOSIS — I25.118 CORONARY ARTERY DISEASE OF NATIVE ARTERY OF NATIVE HEART WITH STABLE ANGINA PECTORIS: ICD-10-CM

## 2022-07-28 PROCEDURE — 3079F PR MOST RECENT DIASTOLIC BLOOD PRESSURE 80-89 MM HG: ICD-10-PCS | Mod: CPTII,S$GLB,, | Performed by: PHYSICIAN ASSISTANT

## 2022-07-28 PROCEDURE — 3008F BODY MASS INDEX DOCD: CPT | Mod: CPTII,S$GLB,, | Performed by: PHYSICIAN ASSISTANT

## 2022-07-28 PROCEDURE — 99215 PR OFFICE/OUTPT VISIT, EST, LEVL V, 40-54 MIN: ICD-10-PCS | Mod: S$GLB,,, | Performed by: PHYSICIAN ASSISTANT

## 2022-07-28 PROCEDURE — 3288F PR FALLS RISK ASSESSMENT DOCUMENTED: ICD-10-PCS | Mod: CPTII,S$GLB,, | Performed by: PHYSICIAN ASSISTANT

## 2022-07-28 PROCEDURE — 1159F MED LIST DOCD IN RCRD: CPT | Mod: CPTII,S$GLB,, | Performed by: PHYSICIAN ASSISTANT

## 2022-07-28 PROCEDURE — G0009 PNEUMOCOCCAL CONJUGATE VACCINE 13-VALENT LESS THAN 5YO & GREATER THAN: ICD-10-PCS | Mod: S$GLB,,, | Performed by: PHYSICIAN ASSISTANT

## 2022-07-28 PROCEDURE — G0009 ADMIN PNEUMOCOCCAL VACCINE: HCPCS | Mod: S$GLB,,, | Performed by: PHYSICIAN ASSISTANT

## 2022-07-28 PROCEDURE — 3075F PR MOST RECENT SYSTOLIC BLOOD PRESS GE 130-139MM HG: ICD-10-PCS | Mod: CPTII,S$GLB,, | Performed by: PHYSICIAN ASSISTANT

## 2022-07-28 PROCEDURE — 1126F AMNT PAIN NOTED NONE PRSNT: CPT | Mod: CPTII,S$GLB,, | Performed by: PHYSICIAN ASSISTANT

## 2022-07-28 PROCEDURE — 99215 OFFICE O/P EST HI 40 MIN: CPT | Mod: S$GLB,,, | Performed by: PHYSICIAN ASSISTANT

## 2022-07-28 PROCEDURE — 1126F PR PAIN SEVERITY QUANTIFIED, NO PAIN PRESENT: ICD-10-PCS | Mod: CPTII,S$GLB,, | Performed by: PHYSICIAN ASSISTANT

## 2022-07-28 PROCEDURE — 1160F RVW MEDS BY RX/DR IN RCRD: CPT | Mod: CPTII,S$GLB,, | Performed by: PHYSICIAN ASSISTANT

## 2022-07-28 PROCEDURE — 3079F DIAST BP 80-89 MM HG: CPT | Mod: CPTII,S$GLB,, | Performed by: PHYSICIAN ASSISTANT

## 2022-07-28 PROCEDURE — 1160F PR REVIEW ALL MEDS BY PRESCRIBER/CLIN PHARMACIST DOCUMENTED: ICD-10-PCS | Mod: CPTII,S$GLB,, | Performed by: PHYSICIAN ASSISTANT

## 2022-07-28 PROCEDURE — 3075F SYST BP GE 130 - 139MM HG: CPT | Mod: CPTII,S$GLB,, | Performed by: PHYSICIAN ASSISTANT

## 2022-07-28 PROCEDURE — 1159F PR MEDICATION LIST DOCUMENTED IN MEDICAL RECORD: ICD-10-PCS | Mod: CPTII,S$GLB,, | Performed by: PHYSICIAN ASSISTANT

## 2022-07-28 PROCEDURE — 90670 PNEUMOCOCCAL CONJUGATE VACCINE 13-VALENT LESS THAN 5YO & GREATER THAN: ICD-10-PCS | Mod: S$GLB,,, | Performed by: PHYSICIAN ASSISTANT

## 2022-07-28 PROCEDURE — 3288F FALL RISK ASSESSMENT DOCD: CPT | Mod: CPTII,S$GLB,, | Performed by: PHYSICIAN ASSISTANT

## 2022-07-28 PROCEDURE — 90670 PCV13 VACCINE IM: CPT | Mod: S$GLB,,, | Performed by: PHYSICIAN ASSISTANT

## 2022-07-28 PROCEDURE — 3008F PR BODY MASS INDEX (BMI) DOCUMENTED: ICD-10-PCS | Mod: CPTII,S$GLB,, | Performed by: PHYSICIAN ASSISTANT

## 2022-07-28 PROCEDURE — 1101F PR PT FALLS ASSESS DOC 0-1 FALLS W/OUT INJ PAST YR: ICD-10-PCS | Mod: CPTII,S$GLB,, | Performed by: PHYSICIAN ASSISTANT

## 2022-07-28 PROCEDURE — 1101F PT FALLS ASSESS-DOCD LE1/YR: CPT | Mod: CPTII,S$GLB,, | Performed by: PHYSICIAN ASSISTANT

## 2022-07-28 RX ORDER — HYDRALAZINE HYDROCHLORIDE 25 MG/1
25 TABLET, FILM COATED ORAL 3 TIMES DAILY
Qty: 180 TABLET | Refills: 3 | Status: SHIPPED | OUTPATIENT
Start: 2022-07-28 | End: 2023-04-12 | Stop reason: SDUPTHER

## 2022-07-28 RX ORDER — OMEPRAZOLE 40 MG/1
40 CAPSULE, DELAYED RELEASE ORAL DAILY
Qty: 90 CAPSULE | Refills: 3 | Status: SHIPPED | OUTPATIENT
Start: 2022-07-28 | End: 2023-09-12

## 2022-07-28 RX ORDER — ATENOLOL 50 MG/1
50 TABLET ORAL DAILY
Qty: 90 TABLET | Refills: 3 | Status: SHIPPED | OUTPATIENT
Start: 2022-07-28 | End: 2023-06-27 | Stop reason: SDUPTHER

## 2022-07-28 RX ORDER — FLUTICASONE PROPIONATE 50 MCG
2 SPRAY, SUSPENSION (ML) NASAL DAILY
Qty: 3 G | Refills: 3 | Status: SHIPPED | OUTPATIENT
Start: 2022-07-28 | End: 2023-09-28

## 2022-07-28 RX ORDER — ERGOCALCIFEROL 1.25 MG/1
50000 CAPSULE ORAL
Qty: 12 CAPSULE | Refills: 3 | Status: SHIPPED | OUTPATIENT
Start: 2022-08-01 | End: 2023-04-27 | Stop reason: SDUPTHER

## 2022-07-28 RX ORDER — HYDROCHLOROTHIAZIDE 12.5 MG/1
12.5 TABLET ORAL DAILY
Qty: 90 TABLET | Refills: 3 | Status: SHIPPED | OUTPATIENT
Start: 2022-07-28 | End: 2023-05-09

## 2022-07-28 NOTE — PROGRESS NOTES
Subjective:       Patient ID: Aleida Bliss is a 72 y.o. female.    Chief Complaint: Follow-up    Aleida Bliss is a 71 yo female coming in today for a med refill visit. She has no acute complaints.     Patient Active Problem List:     Mitral valve disorder     Chest pain with high risk of acute coronary syndrome     Bradycardia     Fatigue     Severe pulmonary hypertension     Severe mitral regurgitation     Elevated left ventricular end-diastolic pressure (LVEDP)     H/O: rheumatic fever     SOB (shortness of breath)     Overweight (BMI 25.0-29.9)     Primary hypertension     Hypercholesterolemia     Nonspecific abnormal electrocardiogram (ECG) (EKG)     Imbalance     Cervicalgia     Abnormal nuclear stress test     Precordial pain     Moderate to severe mitral regurgitation     Coronary artery disease of native artery of native heart with stable angina pectoris     Bruit of left carotid artery     Gastroesophageal reflux disease     Vitamin D deficiency disease     Seasonal allergies  Followed by Cardiology and stable     Past Medical History:  No date: Arthritis  No date: Coronary artery disease  No date: GERD (gastroesophageal reflux disease)  No date: Hypertension  No date: SOB (shortness of breath)      Review of Systems   Constitutional: Negative for activity change, chills, fatigue and fever.   HENT: Negative.    Eyes: Negative.    Respiratory: Negative for apnea, cough, chest tightness, shortness of breath and wheezing.    Cardiovascular: Negative for chest pain, palpitations, leg swelling and claudication.   Gastrointestinal: Negative for abdominal pain, blood in stool, diarrhea, nausea, vomiting and reflux.   Endocrine: Negative.    Genitourinary: Negative.  Negative for dysuria, flank pain, hematuria and urgency.   Integumentary:  Negative.   Neurological: Negative for dizziness, seizures, syncope, weakness and headaches.   Psychiatric/Behavioral: Negative.          Objective:      Physical  Exam  Vitals reviewed.   Constitutional:       General: She is not in acute distress.     Appearance: Normal appearance. She is not ill-appearing, toxic-appearing or diaphoretic.   HENT:      Head: Normocephalic and atraumatic.   Neck:      Vascular: No carotid bruit.   Cardiovascular:      Rate and Rhythm: Normal rate and regular rhythm.      Pulses: Normal pulses.      Heart sounds: Normal heart sounds. No murmur heard.    No friction rub. No gallop.   Pulmonary:      Effort: Pulmonary effort is normal. No respiratory distress.      Breath sounds: Normal breath sounds. No stridor. No wheezing, rhonchi or rales.   Chest:      Chest wall: No tenderness.   Abdominal:      General: There is no distension.      Palpations: There is no mass.      Tenderness: There is no abdominal tenderness. There is no right CVA tenderness, left CVA tenderness, guarding or rebound.      Hernia: No hernia is present.   Musculoskeletal:         General: No swelling or tenderness.      Cervical back: No rigidity or tenderness.   Lymphadenopathy:      Cervical: No cervical adenopathy.   Skin:     General: Skin is warm.      Coloration: Skin is not jaundiced.   Neurological:      General: No focal deficit present.      Mental Status: She is alert.   Psychiatric:         Mood and Affect: Mood normal.         Assessment:       Problem List Items Addressed This Visit     Vitamin D deficiency disease    Relevant Medications    ergocalciferol (ERGOCALCIFEROL) 50,000 unit Cap (Start on 8/1/2022)    Severe pulmonary hypertension - Primary    Relevant Medications    hydroCHLOROthiazide (HYDRODIURIL) 12.5 MG Tab    Seasonal allergies    Relevant Medications    fluticasone propionate (FLONASE) 50 mcg/actuation nasal spray    Primary hypertension    Relevant Medications    hydroCHLOROthiazide (HYDRODIURIL) 12.5 MG Tab    atenoloL (TENORMIN) 50 MG tablet    hydrALAZINE (APRESOLINE) 25 MG tablet    Other Relevant Orders    Comprehensive Metabolic Panel     CBC Auto Differential    TSH    T4, Free    Gastroesophageal reflux disease    Relevant Medications    omeprazole (PRILOSEC) 40 MG capsule    Coronary artery disease of native artery of native heart with stable angina pectoris    Relevant Medications    atenoloL (TENORMIN) 50 MG tablet    Other Relevant Orders    Lipid Panel          Plan:       Severe pulmonary hypertension  -     hydroCHLOROthiazide (HYDRODIURIL) 12.5 MG Tab; Take 1 tablet (12.5 mg total) by mouth once daily.  Dispense: 90 tablet; Refill: 3    Primary hypertension  -     hydroCHLOROthiazide (HYDRODIURIL) 12.5 MG Tab; Take 1 tablet (12.5 mg total) by mouth once daily.  Dispense: 90 tablet; Refill: 3  -     atenoloL (TENORMIN) 50 MG tablet; Take 1 tablet (50 mg total) by mouth once daily.  Dispense: 90 tablet; Refill: 3  -     hydrALAZINE (APRESOLINE) 25 MG tablet; Take 1 tablet (25 mg total) by mouth 3 (three) times daily.  Dispense: 180 tablet; Refill: 3  -     Comprehensive Metabolic Panel; Future; Expected date: 07/28/2022  -     CBC Auto Differential; Future; Expected date: 07/28/2022  -     TSH; Future; Expected date: 07/28/2022  -     T4, Free; Future; Expected date: 07/28/2022    Coronary artery disease of native artery of native heart with stable angina pectoris  -     atenoloL (TENORMIN) 50 MG tablet; Take 1 tablet (50 mg total) by mouth once daily.  Dispense: 90 tablet; Refill: 3  -     Lipid Panel; Future; Expected date: 07/28/2022    Gastroesophageal reflux disease, unspecified whether esophagitis present  -     omeprazole (PRILOSEC) 40 MG capsule; Take 1 capsule (40 mg total) by mouth once daily.  Dispense: 90 capsule; Refill: 3    Vitamin D deficiency disease  -     ergocalciferol (ERGOCALCIFEROL) 50,000 unit Cap; Take 1 capsule (50,000 Units total) by mouth every Monday.  Dispense: 12 capsule; Refill: 3    Seasonal allergies  -     fluticasone propionate (FLONASE) 50 mcg/actuation nasal spray; 2 sprays (100 mcg total) by Each  Nostril route once daily.  Dispense: 3 g; Refill: 3    Other orders  -     (In Office Administered) Pneumococcal Conjugate Vaccine (13 Valent) (IM)    .   I spent 40 minutes on this encounter, time includes face-to-face, chart review, documentation, test review and orders.

## 2022-08-04 ENCOUNTER — CLINICAL SUPPORT (OUTPATIENT)
Dept: CARDIOLOGY | Facility: CLINIC | Age: 72
End: 2022-08-04
Attending: INTERNAL MEDICINE
Payer: MEDICARE

## 2022-08-04 DIAGNOSIS — R09.89 BRUIT OF LEFT CAROTID ARTERY: ICD-10-CM

## 2022-08-04 LAB
LEFT CBA DIAS: 18 CM/S
LEFT CBA SYS: 60 CM/S
LEFT CCA DIST DIAS: 17 CM/S
LEFT CCA DIST SYS: 65 CM/S
LEFT CCA MID DIAS: 26 CM/S
LEFT CCA MID SYS: 85 CM/S
LEFT CCA PROX DIAS: 24 CM/S
LEFT CCA PROX SYS: 88 CM/S
LEFT ECA DIAS: 8 CM/S
LEFT ECA SYS: 40 CM/S
LEFT ICA DIST DIAS: 31 CM/S
LEFT ICA DIST SYS: 66 CM/S
LEFT ICA MID DIAS: 29 CM/S
LEFT ICA MID SYS: 66 CM/S
LEFT ICA PROX DIAS: 24 CM/S
LEFT ICA PROX SYS: 57 CM/S
LEFT VERTEBRAL DIAS: 19 CM/S
LEFT VERTEBRAL SYS: 48 CM/S
OHS CV CAROTID RIGHT ICA EDV HIGHEST: 38
OHS CV CAROTID ULTRASOUND LEFT ICA/CCA RATIO: 1.02
OHS CV CAROTID ULTRASOUND RIGHT ICA/CCA RATIO: 1.17
OHS CV PV CAROTID LEFT HIGHEST CCA: 88
OHS CV PV CAROTID LEFT HIGHEST ICA: 66
OHS CV PV CAROTID RIGHT HIGHEST CCA: 85
OHS CV PV CAROTID RIGHT HIGHEST ICA: 76
OHS CV US CAROTID LEFT HIGHEST EDV: 31
RIGHT CBA DIAS: 17 CM/S
RIGHT CBA SYS: 52 CM/S
RIGHT CCA DIST DIAS: 22 CM/S
RIGHT CCA DIST SYS: 65 CM/S
RIGHT CCA MID DIAS: 25 CM/S
RIGHT CCA MID SYS: 85 CM/S
RIGHT CCA PROX DIAS: 23 CM/S
RIGHT CCA PROX SYS: 84 CM/S
RIGHT ECA DIAS: 0 CM/S
RIGHT ECA SYS: 47 CM/S
RIGHT ICA DIST DIAS: 23 CM/S
RIGHT ICA DIST SYS: 54 CM/S
RIGHT ICA MID DIAS: 38 CM/S
RIGHT ICA MID SYS: 76 CM/S
RIGHT ICA PROX DIAS: 19 CM/S
RIGHT ICA PROX SYS: 55 CM/S
RIGHT VERTEBRAL DIAS: 13 CM/S
RIGHT VERTEBRAL SYS: 39 CM/S

## 2022-08-04 PROCEDURE — 93880 CV US DOPPLER CAROTID (CUPID ONLY): ICD-10-PCS | Mod: S$GLB,,, | Performed by: INTERNAL MEDICINE

## 2022-08-04 PROCEDURE — 93880 EXTRACRANIAL BILAT STUDY: CPT | Mod: S$GLB,,, | Performed by: INTERNAL MEDICINE

## 2022-08-11 ENCOUNTER — OFFICE VISIT (OUTPATIENT)
Dept: FAMILY MEDICINE | Facility: CLINIC | Age: 72
End: 2022-08-11
Payer: MEDICARE

## 2022-08-11 VITALS
SYSTOLIC BLOOD PRESSURE: 106 MMHG | WEIGHT: 170 LBS | DIASTOLIC BLOOD PRESSURE: 76 MMHG | BODY MASS INDEX: 27.32 KG/M2 | HEART RATE: 72 BPM | HEIGHT: 66 IN

## 2022-08-11 DIAGNOSIS — E04.1 THYROID NODULE: Primary | ICD-10-CM

## 2022-08-11 PROCEDURE — 3078F DIAST BP <80 MM HG: CPT | Mod: CPTII,S$GLB,, | Performed by: PHYSICIAN ASSISTANT

## 2022-08-11 PROCEDURE — 3008F PR BODY MASS INDEX (BMI) DOCUMENTED: ICD-10-PCS | Mod: CPTII,S$GLB,, | Performed by: PHYSICIAN ASSISTANT

## 2022-08-11 PROCEDURE — 1101F PR PT FALLS ASSESS DOC 0-1 FALLS W/OUT INJ PAST YR: ICD-10-PCS | Mod: CPTII,S$GLB,, | Performed by: PHYSICIAN ASSISTANT

## 2022-08-11 PROCEDURE — 3074F SYST BP LT 130 MM HG: CPT | Mod: CPTII,S$GLB,, | Performed by: PHYSICIAN ASSISTANT

## 2022-08-11 PROCEDURE — 1126F AMNT PAIN NOTED NONE PRSNT: CPT | Mod: CPTII,S$GLB,, | Performed by: PHYSICIAN ASSISTANT

## 2022-08-11 PROCEDURE — 1126F PR PAIN SEVERITY QUANTIFIED, NO PAIN PRESENT: ICD-10-PCS | Mod: CPTII,S$GLB,, | Performed by: PHYSICIAN ASSISTANT

## 2022-08-11 PROCEDURE — 3074F PR MOST RECENT SYSTOLIC BLOOD PRESSURE < 130 MM HG: ICD-10-PCS | Mod: CPTII,S$GLB,, | Performed by: PHYSICIAN ASSISTANT

## 2022-08-11 PROCEDURE — 3288F FALL RISK ASSESSMENT DOCD: CPT | Mod: CPTII,S$GLB,, | Performed by: PHYSICIAN ASSISTANT

## 2022-08-11 PROCEDURE — 99213 OFFICE O/P EST LOW 20 MIN: CPT | Mod: S$GLB,,, | Performed by: PHYSICIAN ASSISTANT

## 2022-08-11 PROCEDURE — 1159F MED LIST DOCD IN RCRD: CPT | Mod: CPTII,S$GLB,, | Performed by: PHYSICIAN ASSISTANT

## 2022-08-11 PROCEDURE — 99213 PR OFFICE/OUTPT VISIT, EST, LEVL III, 20-29 MIN: ICD-10-PCS | Mod: S$GLB,,, | Performed by: PHYSICIAN ASSISTANT

## 2022-08-11 PROCEDURE — 1159F PR MEDICATION LIST DOCUMENTED IN MEDICAL RECORD: ICD-10-PCS | Mod: CPTII,S$GLB,, | Performed by: PHYSICIAN ASSISTANT

## 2022-08-11 PROCEDURE — 3288F PR FALLS RISK ASSESSMENT DOCUMENTED: ICD-10-PCS | Mod: CPTII,S$GLB,, | Performed by: PHYSICIAN ASSISTANT

## 2022-08-11 PROCEDURE — 3078F PR MOST RECENT DIASTOLIC BLOOD PRESSURE < 80 MM HG: ICD-10-PCS | Mod: CPTII,S$GLB,, | Performed by: PHYSICIAN ASSISTANT

## 2022-08-11 PROCEDURE — 3008F BODY MASS INDEX DOCD: CPT | Mod: CPTII,S$GLB,, | Performed by: PHYSICIAN ASSISTANT

## 2022-08-11 PROCEDURE — 1101F PT FALLS ASSESS-DOCD LE1/YR: CPT | Mod: CPTII,S$GLB,, | Performed by: PHYSICIAN ASSISTANT

## 2022-08-11 RX ORDER — CETIRIZINE HYDROCHLORIDE 10 MG/1
10 TABLET ORAL DAILY
COMMUNITY
End: 2024-02-08

## 2022-08-11 NOTE — PROGRESS NOTES
Subjective:       Patient ID: Aleida Bliss is a 72 y.o. female.    Chief Complaint: Follow-up    Patient is a 71 yo female coming in today at the request of another provider. An MRI was performed which showed an incidental thyroid nodule.     Mass  This is a new problem. The problem occurs constantly. The problem has been unchanged. Pertinent negatives include no abdominal pain, chest pain, fatigue, fever or sore throat. Nothing aggravates the symptoms. She has tried nothing for the symptoms.     Past Medical History:   Diagnosis Date    Arthritis     Coronary artery disease     GERD (gastroesophageal reflux disease)     Hypertension     SOB (shortness of breath)        Review of Systems   Constitutional: Negative for activity change, appetite change, fatigue and fever.   HENT: Negative for sore throat and trouble swallowing.    Respiratory: Negative for chest tightness and shortness of breath.    Cardiovascular: Negative for chest pain.   Gastrointestinal: Negative for abdominal pain.         Objective:      Physical Exam  Vitals reviewed.   Constitutional:       General: She is not in acute distress.     Appearance: Normal appearance. She is not ill-appearing, toxic-appearing or diaphoretic.   Neck:      Thyroid: Thyroid mass present. No thyromegaly or thyroid tenderness.     Cardiovascular:      Rate and Rhythm: Normal rate and regular rhythm.   Pulmonary:      Effort: Pulmonary effort is normal.      Breath sounds: Normal breath sounds.   Abdominal:      Tenderness: There is no abdominal tenderness.   Lymphadenopathy:      Cervical: No cervical adenopathy.   Neurological:      Mental Status: She is alert.         Assessment:       Problem List Items Addressed This Visit    None     Visit Diagnoses     Thyroid nodule    -  Primary    Relevant Orders    US Soft Tissue Head Neck Thyroid          Plan:       Thyroid nodule  -     US Soft Tissue Head Neck Thyroid; Future; Expected date: 08/11/2022

## 2022-08-12 ENCOUNTER — LAB VISIT (OUTPATIENT)
Dept: FAMILY MEDICINE | Facility: CLINIC | Age: 72
End: 2022-08-12
Payer: MEDICARE

## 2022-08-12 DIAGNOSIS — I10 PRIMARY HYPERTENSION: ICD-10-CM

## 2022-08-12 DIAGNOSIS — I25.118 CORONARY ARTERY DISEASE OF NATIVE ARTERY OF NATIVE HEART WITH STABLE ANGINA PECTORIS: ICD-10-CM

## 2022-08-12 LAB
ALBUMIN SERPL BCP-MCNC: 3.6 G/DL (ref 3.5–5.2)
ALP SERPL-CCNC: 89 U/L (ref 55–135)
ALT SERPL W/O P-5'-P-CCNC: 16 U/L (ref 10–44)
ANION GAP SERPL CALC-SCNC: 9 MMOL/L (ref 8–16)
AST SERPL-CCNC: 20 U/L (ref 10–40)
BASOPHILS # BLD AUTO: 0.06 K/UL (ref 0–0.2)
BASOPHILS NFR BLD: 1.3 % (ref 0–1.9)
BILIRUB SERPL-MCNC: 0.9 MG/DL (ref 0.1–1)
BUN SERPL-MCNC: 10 MG/DL (ref 8–23)
CALCIUM SERPL-MCNC: 11 MG/DL (ref 8.7–10.5)
CHLORIDE SERPL-SCNC: 106 MMOL/L (ref 95–110)
CHOLEST SERPL-MCNC: 170 MG/DL (ref 120–199)
CHOLEST/HDLC SERPL: 3.3 {RATIO} (ref 2–5)
CO2 SERPL-SCNC: 25 MMOL/L (ref 23–29)
CREAT SERPL-MCNC: 0.9 MG/DL (ref 0.5–1.4)
DIFFERENTIAL METHOD: NORMAL
EOSINOPHIL # BLD AUTO: 0.2 K/UL (ref 0–0.5)
EOSINOPHIL NFR BLD: 3.8 % (ref 0–8)
ERYTHROCYTE [DISTWIDTH] IN BLOOD BY AUTOMATED COUNT: 14.2 % (ref 11.5–14.5)
EST. GFR  (NO RACE VARIABLE): >60 ML/MIN/1.73 M^2
GLUCOSE SERPL-MCNC: 92 MG/DL (ref 70–110)
HCT VFR BLD AUTO: 38.1 % (ref 37–48.5)
HDLC SERPL-MCNC: 52 MG/DL (ref 40–75)
HDLC SERPL: 30.6 % (ref 20–50)
HGB BLD-MCNC: 12.4 G/DL (ref 12–16)
IMM GRANULOCYTES # BLD AUTO: 0 K/UL (ref 0–0.04)
IMM GRANULOCYTES NFR BLD AUTO: 0 % (ref 0–0.5)
LDLC SERPL CALC-MCNC: 104.2 MG/DL (ref 63–159)
LYMPHOCYTES # BLD AUTO: 1.7 K/UL (ref 1–4.8)
LYMPHOCYTES NFR BLD: 36.3 % (ref 18–48)
MCH RBC QN AUTO: 30.5 PG (ref 27–31)
MCHC RBC AUTO-ENTMCNC: 32.5 G/DL (ref 32–36)
MCV RBC AUTO: 94 FL (ref 82–98)
MONOCYTES # BLD AUTO: 0.6 K/UL (ref 0.3–1)
MONOCYTES NFR BLD: 12.9 % (ref 4–15)
NEUTROPHILS # BLD AUTO: 2.2 K/UL (ref 1.8–7.7)
NEUTROPHILS NFR BLD: 45.7 % (ref 38–73)
NONHDLC SERPL-MCNC: 118 MG/DL
NRBC BLD-RTO: 0 /100 WBC
PLATELET # BLD AUTO: 197 K/UL (ref 150–450)
PMV BLD AUTO: 11.6 FL (ref 9.2–12.9)
POTASSIUM SERPL-SCNC: 4.3 MMOL/L (ref 3.5–5.1)
PROT SERPL-MCNC: 6.8 G/DL (ref 6–8.4)
RBC # BLD AUTO: 4.07 M/UL (ref 4–5.4)
SODIUM SERPL-SCNC: 140 MMOL/L (ref 136–145)
T4 FREE SERPL-MCNC: 0.87 NG/DL (ref 0.71–1.51)
TRIGL SERPL-MCNC: 69 MG/DL (ref 30–150)
TSH SERPL DL<=0.005 MIU/L-ACNC: 0.8 UIU/ML (ref 0.4–4)
WBC # BLD AUTO: 4.8 K/UL (ref 3.9–12.7)

## 2022-08-12 PROCEDURE — 80061 LIPID PANEL: CPT | Performed by: PHYSICIAN ASSISTANT

## 2022-08-12 PROCEDURE — 84439 ASSAY OF FREE THYROXINE: CPT | Performed by: PHYSICIAN ASSISTANT

## 2022-08-12 PROCEDURE — 85025 COMPLETE CBC W/AUTO DIFF WBC: CPT | Performed by: PHYSICIAN ASSISTANT

## 2022-08-12 PROCEDURE — 84443 ASSAY THYROID STIM HORMONE: CPT | Performed by: PHYSICIAN ASSISTANT

## 2022-08-12 PROCEDURE — 80053 COMPREHEN METABOLIC PANEL: CPT | Performed by: PHYSICIAN ASSISTANT

## 2022-08-12 PROCEDURE — 36415 COLL VENOUS BLD VENIPUNCTURE: CPT | Performed by: PHYSICIAN ASSISTANT

## 2022-09-08 ENCOUNTER — TELEPHONE (OUTPATIENT)
Dept: FAMILY MEDICINE | Facility: CLINIC | Age: 72
End: 2022-09-08
Payer: MEDICARE

## 2022-09-08 DIAGNOSIS — R05.9 COUGH: Primary | ICD-10-CM

## 2022-09-08 NOTE — TELEPHONE ENCOUNTER
----- Message from Fadi Carreno sent at 9/8/2022 11:52 AM CDT -----  Type: Needs Medical Advice  Who Called:  pt  Symptoms (please be specific):  pt said she went to the dr in [place] and was dx with COVID and wanted to know if the dr will call in meds for her--please call and advise  Best Call Back Number: [phone] (home)     Additional Information: please advise--thank you

## 2022-09-08 NOTE — TELEPHONE ENCOUNTER
Called pt, she was dx with covid on 9/3/22 at ER. She is still having a lot of cough. They gave her printed rx for zpak and she said she did not get printed rx for Proventil. She was wanting to know if could have something for cough. Please advise in the absence of Sebastian Montiel III, PA-C

## 2022-09-08 NOTE — TELEPHONE ENCOUNTER
A Zpack will not treat COVID. She's still in the window for Paxlovid (if she's interested). Was she interested in Tessalon Pereles or Phenergan cough syrup (or both).

## 2022-09-09 RX ORDER — BENZONATATE 200 MG/1
200 CAPSULE ORAL 3 TIMES DAILY PRN
Qty: 30 CAPSULE | Refills: 0 | Status: SHIPPED | OUTPATIENT
Start: 2022-09-09 | End: 2022-09-19

## 2022-09-09 RX ORDER — PROMETHAZINE HYDROCHLORIDE AND DEXTROMETHORPHAN HYDROBROMIDE 6.25; 15 MG/5ML; MG/5ML
5 SYRUP ORAL EVERY 4 HOURS PRN
Qty: 180 ML | Refills: 0 | Status: SHIPPED | OUTPATIENT
Start: 2022-09-09 | End: 2022-09-19

## 2022-09-09 NOTE — TELEPHONE ENCOUNTER
Spoke to pt informing her that Dr. Lyman called in Tessalon Perles and Phenergan cough syrup to [facility]. Advised pt to take tessalon Perles in the daytime and the Phenergan syrup at night due to drowsiness. Pt verbalized understanding.

## 2022-09-09 NOTE — TELEPHONE ENCOUNTER
Tessalon perles and Phenergan cough syrup sent to [facility]. Use Tessalon during the day and Phenergan at night as it can cause drowsiness. Unfortunately, you are now out of the 5 day window for Paxlovid and it has a major contraindication with the Sildenafil you take.

## 2022-09-27 ENCOUNTER — TELEPHONE (OUTPATIENT)
Dept: FAMILY MEDICINE | Facility: CLINIC | Age: 72
End: 2022-09-27
Payer: MEDICARE

## 2022-09-27 NOTE — TELEPHONE ENCOUNTER
Results from MRI reviewed with patient during 8/11/22 office visit and US soft tissue ordered at that time.    US rescheduled for 10/4/22 and follow up with PCP on 10/19/22

## 2022-09-28 ENCOUNTER — SPECIALTY PHARMACY (OUTPATIENT)
Dept: PHARMACY | Facility: CLINIC | Age: 72
End: 2022-09-28
Payer: MEDICARE

## 2022-09-28 NOTE — TELEPHONE ENCOUNTER
Specialty Pharmacy - Refill Coordination    Specialty Medication Orders Linked to Encounter      Flowsheet Row Most Recent Value   Medication #1 sildenafil (REVATIO) 20 mg Tab (Order#[id number], Rx#[id number])            Refill Questions - Documented Responses      Flowsheet Row Most Recent Value   Patient Availability and HIPAA Verification    Does patient want to proceed with activity? Yes   HIPAA/medical authority confirmed? Yes   Relationship to patient of person spoken to? Self   Refill Screening Questions    Changes to allergies? No   Changes to medications? No   New conditions since last clinic visit? No   Unplanned office visit, urgent care, ED, or hospital admission in the last 4 weeks? No   How does patient/caregiver feel medication is working? Good   Financial problems or insurance changes? No   How many doses of your specialty medications were missed in the last 4 weeks? 0   Would patient like to speak to a pharmacist? No   When does the patient need to receive the medication? 10/04/22   Refill Delivery Questions    How will the patient receive the medication? Delivery Steffi   When does the patient need to receive the medication? 10/04/22   Shipping Address Home   Address in [facility] confirmed and updated if neccessary? Yes   Expected Copay ($) 3.95   Is the patient able to afford the medication copay? Yes   Payment Method invoice (approval required)  [9/28 [facility] pt mails in payments]   Days supply of Refill 90   Supplies needed? No supplies needed   Refill activity completed? Yes   Refill activity plan Refill scheduled   Shipment/Pickup Date: 10/03/22            Current Outpatient Medications   Medication Sig    albuterol (PROVENTIL/VENTOLIN HFA) 90 mcg/actuation inhaler Inhale 1-2 puffs into the lungs every 6 (six) hours as needed for Wheezing. Rescue    atenoloL (TENORMIN) 50 MG tablet Take 1 tablet (50 mg total) by mouth once daily.    cetirizine (ZYRTEC) 10 MG tablet Take 10 mg by mouth once  daily.    ergocalciferol (ERGOCALCIFEROL) 50,000 unit Cap Take 1 capsule (50,000 Units total) by mouth every Monday.    fluticasone propionate (FLONASE) 50 mcg/actuation nasal spray 2 sprays (100 mcg total) by Each Nostril route once daily.    hydrALAZINE (APRESOLINE) 25 MG tablet Take 1 tablet (25 mg total) by mouth 3 (three) times daily.    hydroCHLOROthiazide (HYDRODIURIL) 12.5 MG Tab Take 1 tablet (12.5 mg total) by mouth once daily.    KLOR-CON 10 10 mEq TbSR TAKE 1 TABLET BY MOUTH ONCE DAILY    omeprazole (PRILOSEC) 40 MG capsule Take 1 capsule (40 mg total) by mouth once daily.    pravastatin (PRAVACHOL) 40 MG tablet TAKE 1 TABLET BY MOUTH EVERY DAY IN THE EVENING    sildenafil (REVATIO) 20 mg Tab Take 1 tablet (20 mg total) by mouth 3 (three) times daily.    sucralfate (CARAFATE) 1 gram tablet Take 1 g by mouth every evening.    umeclidinium-vilanteroL (ANORO ELLIPTA) 62.5-25 mcg/actuation DsDv Inhale 1 puff into the lungs as needed.   Last reviewed on 9/9/2022  2:46 PM by Diego Ag LPN    Review of patient's allergies indicates:   Allergen Reactions    Lisinopril Other (See Comments)     Other reaction(s): Cough      Cyclobenzaprine Rash    Losartan Nausea Only     Other reaction(s): Unknown      Last reviewed on  9/9/2022 2:46 PM by Diego Ag      Tasks added this encounter   12/20/2022 - Refill Call (Auto Added)   Tasks due within next 3 months   No tasks due.     [facility]  [address] [place] [id number]  Phone: [phone]  Fax: [phone]

## 2022-10-03 ENCOUNTER — HOSPITAL ENCOUNTER (OUTPATIENT)
Dept: RADIOLOGY | Facility: HOSPITAL | Age: 72
Discharge: HOME | End: 2022-10-03
Attending: PHYSICIAN ASSISTANT
Payer: MEDICARE

## 2022-10-03 DIAGNOSIS — E04.1 THYROID NODULE: ICD-10-CM

## 2022-10-03 DIAGNOSIS — E04.1 THYROID NODULE: Primary | ICD-10-CM

## 2022-10-03 PROCEDURE — 76536 US EXAM OF HEAD AND NECK: CPT | Mod: TC,PO

## 2022-10-03 PROCEDURE — 76536 US EXAM OF HEAD AND NECK: CPT | Mod: 26,,, | Performed by: RADIOLOGY

## 2022-10-03 PROCEDURE — 76536 US SOFT TISSUE HEAD NECK THYROID: ICD-10-PCS | Mod: 26,,, | Performed by: RADIOLOGY

## 2022-10-13 ENCOUNTER — CLINICAL SUPPORT (OUTPATIENT)
Dept: CARDIOLOGY | Facility: CLINIC | Age: 72
End: 2022-10-13
Attending: INTERNAL MEDICINE
Payer: MEDICARE

## 2022-10-13 VITALS — HEIGHT: 66 IN | BODY MASS INDEX: 27.32 KG/M2 | WEIGHT: 170 LBS

## 2022-10-13 DIAGNOSIS — I34.0 NONRHEUMATIC MITRAL VALVE REGURGITATION: ICD-10-CM

## 2022-10-13 DIAGNOSIS — I27.20 SEVERE PULMONARY HYPERTENSION: ICD-10-CM

## 2022-10-13 LAB
ASCENDING AORTA: 2.89 CM
AV INDEX (PROSTH): 0.49
AV MEAN GRADIENT: 7 MMHG
AV PEAK GRADIENT: 13 MMHG
AV VALVE AREA: 1.63 CM2
AV VELOCITY RATIO: 0.5
BSA FOR ECHO PROCEDURE: 1.89 M2
CV ECHO LV RWT: 0.6 CM
DOP CALC AO PEAK VEL: 1.81 M/S
DOP CALC AO VTI: 40.9 CM
DOP CALC LVOT AREA: 3.3 CM2
DOP CALC LVOT DIAMETER: 2.05 CM
DOP CALC LVOT PEAK VEL: 0.91 M/S
DOP CALC LVOT STROKE VOLUME: 66.64 CM3
DOP CALC MV VTI: 59.4 CM
DOP CALCLVOT PEAK VEL VTI: 20.2 CM
E WAVE DECELERATION TIME: 365.98 MSEC
E/A RATIO: 1.26
E/E' RATIO: 28 M/S
ECHO LV POSTERIOR WALL: 1.36 CM (ref 0.6–1.1)
EJECTION FRACTION: 70 %
FRACTIONAL SHORTENING: 45 % (ref 28–44)
INTERVENTRICULAR SEPTUM: 1.04 CM (ref 0.6–1.1)
IVRT: 83.04 MSEC
LA MAJOR: 6.85 CM
LA MINOR: 6.35 CM
LA WIDTH: 5.1 CM
LEFT ATRIUM SIZE: 5.64 CM
LEFT ATRIUM VOLUME INDEX: 86.2 ML/M2
LEFT ATRIUM VOLUME: 161.13 CM3
LEFT INTERNAL DIMENSION IN SYSTOLE: 2.51 CM (ref 2.1–4)
LEFT VENTRICLE DIASTOLIC VOLUME INDEX: 50.89 ML/M2
LEFT VENTRICLE DIASTOLIC VOLUME: 95.16 ML
LEFT VENTRICLE MASS INDEX: 108 G/M2
LEFT VENTRICLE SYSTOLIC VOLUME INDEX: 12 ML/M2
LEFT VENTRICLE SYSTOLIC VOLUME: 22.51 ML
LEFT VENTRICULAR INTERNAL DIMENSION IN DIASTOLE: 4.56 CM (ref 3.5–6)
LEFT VENTRICULAR MASS: 202.22 G
LV LATERAL E/E' RATIO: 30.8 M/S
LV SEPTAL E/E' RATIO: 25.67 M/S
LVOT MG: 2.06 MMHG
LVOT MV: 0.68 CM/S
MV MEAN GRADIENT: 5 MMHG
MV PEAK A VEL: 1.22 M/S
MV PEAK E VEL: 1.54 M/S
MV PEAK GRADIENT: 14 MMHG
MV STENOSIS PRESSURE HALF TIME: 68.8 MS
MV VALVE AREA BY CONTINUITY EQUATION: 1.12 CM2
MV VALVE AREA P 1/2 METHOD: 3.2 CM2
PISA MRMAX VEL: 5.4 M/S
PISA TR MAX VEL: 2.81 M/S
PULM VEIN S/D RATIO: 1.16
PV PEAK D VEL: 0.56 M/S
PV PEAK S VEL: 0.65 M/S
RA MAJOR: 4.22 CM
RA PRESSURE: 3 MMHG
RA WIDTH: 3.3 CM
RIGHT VENTRICULAR END-DIASTOLIC DIMENSION: 3.63 CM
RIGHT VENTRICULAR LENGTH IN DIASTOLE (APICAL 4-CHAMBER VIEW): 6.24 CM
RV MID DIAMA: 25.02 CM
RV TISSUE DOPPLER FREE WALL SYSTOLIC VELOCITY 1 (APICAL 4 CHAMBER VIEW): 0.01 CM/S
SINUS: 3.13 CM
STJ: 2.76 CM
TDI LATERAL: 0.05 M/S
TDI SEPTAL: 0.06 M/S
TDI: 0.06 M/S
TR MAX PG: 32 MMHG
TRICUSPID ANNULAR PLANE SYSTOLIC EXCURSION: 2.25 CM
TV REST PULMONARY ARTERY PRESSURE: 35 MMHG

## 2022-10-13 PROCEDURE — 93306 TTE W/DOPPLER COMPLETE: CPT | Mod: S$GLB,,, | Performed by: INTERNAL MEDICINE

## 2022-10-13 PROCEDURE — 93306 ECHO (CUPID ONLY): ICD-10-PCS | Mod: S$GLB,,, | Performed by: INTERNAL MEDICINE

## 2022-10-19 ENCOUNTER — OFFICE VISIT (OUTPATIENT)
Dept: FAMILY MEDICINE | Facility: CLINIC | Age: 72
End: 2022-10-19
Payer: MEDICARE

## 2022-10-19 VITALS
WEIGHT: 172.81 LBS | SYSTOLIC BLOOD PRESSURE: 126 MMHG | HEIGHT: 66 IN | DIASTOLIC BLOOD PRESSURE: 84 MMHG | HEART RATE: 68 BPM | BODY MASS INDEX: 27.77 KG/M2

## 2022-10-19 DIAGNOSIS — E83.42 HYPOMAGNESEMIA: Primary | ICD-10-CM

## 2022-10-19 PROCEDURE — 1101F PT FALLS ASSESS-DOCD LE1/YR: CPT | Mod: CPTII,S$GLB,, | Performed by: PHYSICIAN ASSISTANT

## 2022-10-19 PROCEDURE — 3079F DIAST BP 80-89 MM HG: CPT | Mod: CPTII,S$GLB,, | Performed by: PHYSICIAN ASSISTANT

## 2022-10-19 PROCEDURE — 1101F PR PT FALLS ASSESS DOC 0-1 FALLS W/OUT INJ PAST YR: ICD-10-PCS | Mod: CPTII,S$GLB,, | Performed by: PHYSICIAN ASSISTANT

## 2022-10-19 PROCEDURE — 3079F PR MOST RECENT DIASTOLIC BLOOD PRESSURE 80-89 MM HG: ICD-10-PCS | Mod: CPTII,S$GLB,, | Performed by: PHYSICIAN ASSISTANT

## 2022-10-19 PROCEDURE — 1126F PR PAIN SEVERITY QUANTIFIED, NO PAIN PRESENT: ICD-10-PCS | Mod: CPTII,S$GLB,, | Performed by: PHYSICIAN ASSISTANT

## 2022-10-19 PROCEDURE — 99214 OFFICE O/P EST MOD 30 MIN: CPT | Mod: S$GLB,,, | Performed by: PHYSICIAN ASSISTANT

## 2022-10-19 PROCEDURE — 3074F SYST BP LT 130 MM HG: CPT | Mod: CPTII,S$GLB,, | Performed by: PHYSICIAN ASSISTANT

## 2022-10-19 PROCEDURE — 99214 PR OFFICE/OUTPT VISIT, EST, LEVL IV, 30-39 MIN: ICD-10-PCS | Mod: S$GLB,,, | Performed by: PHYSICIAN ASSISTANT

## 2022-10-19 PROCEDURE — 3288F FALL RISK ASSESSMENT DOCD: CPT | Mod: CPTII,S$GLB,, | Performed by: PHYSICIAN ASSISTANT

## 2022-10-19 PROCEDURE — 3074F PR MOST RECENT SYSTOLIC BLOOD PRESSURE < 130 MM HG: ICD-10-PCS | Mod: CPTII,S$GLB,, | Performed by: PHYSICIAN ASSISTANT

## 2022-10-19 PROCEDURE — 1159F MED LIST DOCD IN RCRD: CPT | Mod: CPTII,S$GLB,, | Performed by: PHYSICIAN ASSISTANT

## 2022-10-19 PROCEDURE — 1159F PR MEDICATION LIST DOCUMENTED IN MEDICAL RECORD: ICD-10-PCS | Mod: CPTII,S$GLB,, | Performed by: PHYSICIAN ASSISTANT

## 2022-10-19 PROCEDURE — 1126F AMNT PAIN NOTED NONE PRSNT: CPT | Mod: CPTII,S$GLB,, | Performed by: PHYSICIAN ASSISTANT

## 2022-10-19 PROCEDURE — 3288F PR FALLS RISK ASSESSMENT DOCUMENTED: ICD-10-PCS | Mod: CPTII,S$GLB,, | Performed by: PHYSICIAN ASSISTANT

## 2022-10-19 RX ORDER — MONTELUKAST SODIUM 10 MG/1
10 TABLET ORAL NIGHTLY
Qty: 90 TABLET | Refills: 1 | Status: SHIPPED | OUTPATIENT
Start: 2022-10-19 | End: 2022-11-18

## 2022-10-19 RX ORDER — LANOLIN ALCOHOL/MO/W.PET/CERES
400 CREAM (GRAM) TOPICAL DAILY
Qty: 90 TABLET | Refills: 3 | Status: SHIPPED | OUTPATIENT
Start: 2022-10-19 | End: 2024-01-11

## 2022-10-19 NOTE — PROGRESS NOTES
Subjective:       Patient ID: Aleida Bliss is a 72 y.o. female.    Chief Complaint: Follow-up (6 months)    URI   This is a recurrent problem. The current episode started 1 to 4 weeks ago. The problem has been waxing and waning. There has been no fever. Associated symptoms include congestion, rhinorrhea, sinus pain, sneezing, a sore throat and swollen glands. Pertinent negatives include no chest pain. She has tried nothing for the symptoms.   Review of Systems   Constitutional:  Negative for activity change, chills, fatigue and fever.   HENT:  Positive for nasal congestion, rhinorrhea, sneezing and sore throat.    Respiratory:  Negative for chest tightness and shortness of breath.    Cardiovascular:  Negative for chest pain.       Past Medical History:   Diagnosis Date    Arthritis     Coronary artery disease     GERD (gastroesophageal reflux disease)     Hypertension     SOB (shortness of breath)        Objective:      Physical Exam  Constitutional:       General: She is not in acute distress.     Appearance: Normal appearance. She is not ill-appearing, toxic-appearing or diaphoretic.   HENT:      Head: Normocephalic and atraumatic.      Right Ear: Tympanic membrane, ear canal and external ear normal. There is no impacted cerumen.      Left Ear: Tympanic membrane, ear canal and external ear normal. There is no impacted cerumen.      Nose: Congestion and rhinorrhea present.      Mouth/Throat:      Mouth: Mucous membranes are moist.   Eyes:      Conjunctiva/sclera: Conjunctivae normal.   Cardiovascular:      Rate and Rhythm: Normal rate and regular rhythm.      Pulses: Normal pulses.   Pulmonary:      Effort: Pulmonary effort is normal. No respiratory distress.      Breath sounds: Normal breath sounds. No stridor. No wheezing, rhonchi or rales.   Chest:      Chest wall: No tenderness.   Abdominal:      Tenderness: There is no abdominal tenderness.   Musculoskeletal:         General: No swelling.   Skin:      General: Skin is warm and dry.   Neurological:      General: No focal deficit present.      Mental Status: She is alert.   Psychiatric:         Mood and Affect: Mood normal.       Assessment:       Problem List Items Addressed This Visit    None  Visit Diagnoses       Hypomagnesemia    -  Primary    Relevant Medications    magnesium oxide (MAG-OX) 400 mg (241.3 mg magnesium) tablet              Plan:       Hypomagnesemia  -     magnesium oxide (MAG-OX) 400 mg (241.3 mg magnesium) tablet; Take 1 tablet (400 mg total) by mouth once daily.  Dispense: 90 tablet; Refill: 3    Other orders  -     montelukast (SINGULAIR) 10 mg tablet; Take 1 tablet (10 mg total) by mouth every evening.  Dispense: 90 tablet; Refill: 1         I spent 30 minutes on this encounter, time includes face-to-face, chart review, documentation, test review and orders.

## 2022-10-21 ENCOUNTER — TELEPHONE (OUTPATIENT)
Dept: FAMILY MEDICINE | Facility: CLINIC | Age: 72
End: 2022-10-21
Payer: MEDICARE

## 2022-10-21 NOTE — TELEPHONE ENCOUNTER
"PA completed for pt Mag- OX 400mg.     PA denied reason " medicare rule in the prescription drug manual says OTC drugs are excluded from medicare part d coverage."     Please advise.   "

## 2022-10-24 ENCOUNTER — OFFICE VISIT (OUTPATIENT)
Dept: OTOLARYNGOLOGY | Facility: CLINIC | Age: 72
End: 2022-10-24
Payer: MEDICARE

## 2022-10-24 VITALS — HEIGHT: 66 IN | BODY MASS INDEX: 27.63 KG/M2 | WEIGHT: 171.94 LBS

## 2022-10-24 DIAGNOSIS — R13.10 DYSPHAGIA, UNSPECIFIED TYPE: ICD-10-CM

## 2022-10-24 DIAGNOSIS — E04.2 MULTIPLE THYROID NODULES: Primary | ICD-10-CM

## 2022-10-24 DIAGNOSIS — E04.1 THYROID NODULE: ICD-10-CM

## 2022-10-24 DIAGNOSIS — R60.0 SUBMANDIBULAR GLAND SWELLING: ICD-10-CM

## 2022-10-24 PROCEDURE — 99999 PR PBB SHADOW E&M-EST. PATIENT-LVL IV: CPT | Mod: PBBFAC,,, | Performed by: OTOLARYNGOLOGY

## 2022-10-24 PROCEDURE — 31575 DIAGNOSTIC LARYNGOSCOPY: CPT | Mod: S$GLB,,, | Performed by: OTOLARYNGOLOGY

## 2022-10-24 PROCEDURE — 31575 PR LARYNGOSCOPY, FLEXIBLE; DIAGNOSTIC: ICD-10-PCS | Mod: S$GLB,,, | Performed by: OTOLARYNGOLOGY

## 2022-10-24 PROCEDURE — 99999 PR PBB SHADOW E&M-EST. PATIENT-LVL IV: ICD-10-PCS | Mod: PBBFAC,,, | Performed by: OTOLARYNGOLOGY

## 2022-10-24 PROCEDURE — 1159F MED LIST DOCD IN RCRD: CPT | Mod: CPTII,S$GLB,, | Performed by: OTOLARYNGOLOGY

## 2022-10-24 PROCEDURE — 3288F FALL RISK ASSESSMENT DOCD: CPT | Mod: CPTII,S$GLB,, | Performed by: OTOLARYNGOLOGY

## 2022-10-24 PROCEDURE — 1160F PR REVIEW ALL MEDS BY PRESCRIBER/CLIN PHARMACIST DOCUMENTED: ICD-10-PCS | Mod: CPTII,S$GLB,, | Performed by: OTOLARYNGOLOGY

## 2022-10-24 PROCEDURE — 1126F AMNT PAIN NOTED NONE PRSNT: CPT | Mod: CPTII,S$GLB,, | Performed by: OTOLARYNGOLOGY

## 2022-10-24 PROCEDURE — 1101F PT FALLS ASSESS-DOCD LE1/YR: CPT | Mod: CPTII,S$GLB,, | Performed by: OTOLARYNGOLOGY

## 2022-10-24 PROCEDURE — 99204 PR OFFICE/OUTPT VISIT, NEW, LEVL IV, 45-59 MIN: ICD-10-PCS | Mod: 25,S$GLB,, | Performed by: OTOLARYNGOLOGY

## 2022-10-24 PROCEDURE — 1160F RVW MEDS BY RX/DR IN RCRD: CPT | Mod: CPTII,S$GLB,, | Performed by: OTOLARYNGOLOGY

## 2022-10-24 PROCEDURE — 1101F PR PT FALLS ASSESS DOC 0-1 FALLS W/OUT INJ PAST YR: ICD-10-PCS | Mod: CPTII,S$GLB,, | Performed by: OTOLARYNGOLOGY

## 2022-10-24 PROCEDURE — 1159F PR MEDICATION LIST DOCUMENTED IN MEDICAL RECORD: ICD-10-PCS | Mod: CPTII,S$GLB,, | Performed by: OTOLARYNGOLOGY

## 2022-10-24 PROCEDURE — 1126F PR PAIN SEVERITY QUANTIFIED, NO PAIN PRESENT: ICD-10-PCS | Mod: CPTII,S$GLB,, | Performed by: OTOLARYNGOLOGY

## 2022-10-24 PROCEDURE — 99204 OFFICE O/P NEW MOD 45 MIN: CPT | Mod: 25,S$GLB,, | Performed by: OTOLARYNGOLOGY

## 2022-10-24 PROCEDURE — 3288F PR FALLS RISK ASSESSMENT DOCUMENTED: ICD-10-PCS | Mod: CPTII,S$GLB,, | Performed by: OTOLARYNGOLOGY

## 2022-10-24 NOTE — TELEPHONE ENCOUNTER
Sebastain Montiel III, RUBEN  You; Tiana PARKS III Staff 3 days ago     Recommend to take Magox otc

## 2022-11-02 ENCOUNTER — OFFICE VISIT (OUTPATIENT)
Dept: CARDIOLOGY | Facility: CLINIC | Age: 72
End: 2022-11-02
Payer: MEDICARE

## 2022-11-02 VITALS
HEIGHT: 66 IN | HEART RATE: 87 BPM | BODY MASS INDEX: 28.23 KG/M2 | SYSTOLIC BLOOD PRESSURE: 132 MMHG | WEIGHT: 175.69 LBS | DIASTOLIC BLOOD PRESSURE: 76 MMHG

## 2022-11-02 DIAGNOSIS — I65.22 CAROTID ARTERY PLAQUE, LEFT: Chronic | ICD-10-CM

## 2022-11-02 DIAGNOSIS — I08.0 MITRAL REGURGITATION AND AORTIC STENOSIS: Primary | Chronic | ICD-10-CM

## 2022-11-02 DIAGNOSIS — I25.118 CORONARY ARTERY DISEASE OF NATIVE ARTERY OF NATIVE HEART WITH STABLE ANGINA PECTORIS: Chronic | ICD-10-CM

## 2022-11-02 DIAGNOSIS — E66.3 OVERWEIGHT (BMI 25.0-29.9): Chronic | ICD-10-CM

## 2022-11-02 DIAGNOSIS — E78.00 HYPERCHOLESTEROLEMIA: ICD-10-CM

## 2022-11-02 DIAGNOSIS — I27.20 PULMONARY HYPERTENSION: Chronic | ICD-10-CM

## 2022-11-02 DIAGNOSIS — I10 PRIMARY HYPERTENSION: Chronic | ICD-10-CM

## 2022-11-02 PROCEDURE — 3288F FALL RISK ASSESSMENT DOCD: CPT | Mod: CPTII,S$GLB,, | Performed by: INTERNAL MEDICINE

## 2022-11-02 PROCEDURE — 3008F BODY MASS INDEX DOCD: CPT | Mod: CPTII,S$GLB,, | Performed by: INTERNAL MEDICINE

## 2022-11-02 PROCEDURE — 3288F PR FALLS RISK ASSESSMENT DOCUMENTED: ICD-10-PCS | Mod: CPTII,S$GLB,, | Performed by: INTERNAL MEDICINE

## 2022-11-02 PROCEDURE — 1159F MED LIST DOCD IN RCRD: CPT | Mod: CPTII,S$GLB,, | Performed by: INTERNAL MEDICINE

## 2022-11-02 PROCEDURE — 3078F DIAST BP <80 MM HG: CPT | Mod: CPTII,S$GLB,, | Performed by: INTERNAL MEDICINE

## 2022-11-02 PROCEDURE — 99214 PR OFFICE/OUTPT VISIT, EST, LEVL IV, 30-39 MIN: ICD-10-PCS | Mod: S$GLB,,, | Performed by: INTERNAL MEDICINE

## 2022-11-02 PROCEDURE — 3008F PR BODY MASS INDEX (BMI) DOCUMENTED: ICD-10-PCS | Mod: CPTII,S$GLB,, | Performed by: INTERNAL MEDICINE

## 2022-11-02 PROCEDURE — 1126F PR PAIN SEVERITY QUANTIFIED, NO PAIN PRESENT: ICD-10-PCS | Mod: CPTII,S$GLB,, | Performed by: INTERNAL MEDICINE

## 2022-11-02 PROCEDURE — 99214 OFFICE O/P EST MOD 30 MIN: CPT | Mod: S$GLB,,, | Performed by: INTERNAL MEDICINE

## 2022-11-02 PROCEDURE — 1101F PR PT FALLS ASSESS DOC 0-1 FALLS W/OUT INJ PAST YR: ICD-10-PCS | Mod: CPTII,S$GLB,, | Performed by: INTERNAL MEDICINE

## 2022-11-02 PROCEDURE — 3078F PR MOST RECENT DIASTOLIC BLOOD PRESSURE < 80 MM HG: ICD-10-PCS | Mod: CPTII,S$GLB,, | Performed by: INTERNAL MEDICINE

## 2022-11-02 PROCEDURE — 1126F AMNT PAIN NOTED NONE PRSNT: CPT | Mod: CPTII,S$GLB,, | Performed by: INTERNAL MEDICINE

## 2022-11-02 PROCEDURE — 3075F SYST BP GE 130 - 139MM HG: CPT | Mod: CPTII,S$GLB,, | Performed by: INTERNAL MEDICINE

## 2022-11-02 PROCEDURE — 1159F PR MEDICATION LIST DOCUMENTED IN MEDICAL RECORD: ICD-10-PCS | Mod: CPTII,S$GLB,, | Performed by: INTERNAL MEDICINE

## 2022-11-02 PROCEDURE — 3075F PR MOST RECENT SYSTOLIC BLOOD PRESS GE 130-139MM HG: ICD-10-PCS | Mod: CPTII,S$GLB,, | Performed by: INTERNAL MEDICINE

## 2022-11-02 PROCEDURE — 1101F PT FALLS ASSESS-DOCD LE1/YR: CPT | Mod: CPTII,S$GLB,, | Performed by: INTERNAL MEDICINE

## 2022-11-02 NOTE — PROGRESS NOTES
Subjective:    Patient ID:  Aleida Bliss is a 72 y.o. female who presents for Follow-up, Valvular Heart Disease, Hypertension, and Shortness of Breath        Follow-up  Pertinent negatives include no abdominal pain, change in bowel habit, chest pain, chills, congestion, coughing (LESS), diaphoresis, fever, headaches, nausea or weakness.   ECHO EF 70%, MOD TO SEVERE MR, MILD AS, PAP DOWN TO 35, DOING BETTER, HAD COVID IN September, ER, LABS NOTED, TX, L THYROID PLAQUE AND THYROID NODULE TO HAVE BX, HAD US, SEE ROS    Past Medical History:   Diagnosis Date    Arthritis     Coronary artery disease     GERD (gastroesophageal reflux disease)     Hypertension     SOB (shortness of breath)      Past Surgical History:   Procedure Laterality Date    CATARACT EXTRACTION EXTRACAPSULAR W/ INTRAOCULAR LENS IMPLANTATION      CATHETERIZATION OF BOTH LEFT AND RIGHT HEART N/A 02/15/2022    Procedure: CATHETERIZATION, HEART, BOTH LEFT AND RIGHT;  Surgeon: Cuong Hinkle MD;  Location: [facility] CATH;  Service: Cardiology;  Laterality: N/A;    CHOLECYSTECTOMY      COLONOSCOPY      COLONOSCOPY N/A 3/15/2022    Procedure: COLONOSCOPY;  Surgeon: Varun Toro MD;  Location: [facility] ENDO;  Service: Endoscopy;  Laterality: N/A;    HYSTERECTOMY       Family History   Problem Relation Age of Onset    Heart disease Mother     Cancer Father         prostate     Social History     Socioeconomic History    Marital status:    Tobacco Use    Smoking status: Never    Smokeless tobacco: Never   Substance and Sexual Activity    Alcohol use: No    Drug use: No    Sexual activity: Not Currently       Review of patient's allergies indicates:   Allergen Reactions    Lisinopril Other (See Comments)     Other reaction(s): Cough      Cyclobenzaprine Rash    Losartan Nausea Only     Other reaction(s): Unknown         Current Outpatient Medications:     albuterol (PROVENTIL/VENTOLIN HFA) 90 mcg/actuation inhaler, Inhale 1-2 puffs into the lungs every 6  (six) hours as needed for Wheezing. Rescue, Disp: 8 g, Rfl: 0    atenoloL (TENORMIN) 50 MG tablet, Take 1 tablet (50 mg total) by mouth once daily., Disp: 90 tablet, Rfl: 3    cetirizine (ZYRTEC) 10 MG tablet, Take 10 mg by mouth once daily., Disp: , Rfl:     ergocalciferol (ERGOCALCIFEROL) 50,000 unit Cap, Take 1 capsule (50,000 Units total) by mouth every Monday., Disp: 12 capsule, Rfl: 3    fluticasone propionate (FLONASE) 50 mcg/actuation nasal spray, 2 sprays (100 mcg total) by Each Nostril route once daily., Disp: 3 g, Rfl: 3    hydrALAZINE (APRESOLINE) 25 MG tablet, Take 1 tablet (25 mg total) by mouth 3 (three) times daily., Disp: 180 tablet, Rfl: 3    hydroCHLOROthiazide (HYDRODIURIL) 12.5 MG Tab, Take 1 tablet (12.5 mg total) by mouth once daily., Disp: 90 tablet, Rfl: 3    KLOR-CON 10 10 mEq TbSR, TAKE 1 TABLET BY MOUTH ONCE DAILY, Disp: 90 tablet, Rfl: 1    magnesium oxide (MAG-OX) 400 mg (241.3 mg magnesium) tablet, Take 1 tablet (400 mg total) by mouth once daily., Disp: 90 tablet, Rfl: 3    montelukast (SINGULAIR) 10 mg tablet, Take 1 tablet (10 mg total) by mouth every evening., Disp: 90 tablet, Rfl: 1    omeprazole (PRILOSEC) 40 MG capsule, Take 1 capsule (40 mg total) by mouth once daily., Disp: 90 capsule, Rfl: 3    pravastatin (PRAVACHOL) 40 MG tablet, TAKE 1 TABLET BY MOUTH EVERY DAY IN THE EVENING, Disp: 90 tablet, Rfl: 0    sildenafil (REVATIO) 20 mg Tab, Take 1 tablet (20 mg total) by mouth 3 (three) times daily., Disp: 270 tablet, Rfl: 3    sucralfate (CARAFATE) 1 gram tablet, Take 1 g by mouth every evening., Disp: , Rfl:     umeclidinium-vilanteroL (ANORO ELLIPTA) 62.5-25 mcg/actuation DsDv, Inhale 1 puff into the lungs as needed., Disp: , Rfl:     Review of Systems   Constitutional: Negative for chills, decreased appetite, diaphoresis, fever, malaise/fatigue and night sweats.   HENT:  Negative for congestion and nosebleeds.    Eyes:  Negative for blurred vision and visual disturbance.  "  Cardiovascular:  Negative for chest pain, claudication, cyanosis, dyspnea on exertion (MILD), irregular heartbeat, leg swelling, near-syncope, orthopnea, palpitations, paroxysmal nocturnal dyspnea and syncope.   Respiratory:  Negative for cough (LESS), hemoptysis, shortness of breath and wheezing.    Hematologic/Lymphatic: Negative for adenopathy. Does not bruise/bleed easily.   Skin:  Negative for color change and itching.   Musculoskeletal:  Negative for back pain and falls.   Gastrointestinal:  Negative for abdominal pain, change in bowel habit, jaundice, melena and nausea.   Genitourinary:  Negative for dysuria and flank pain.   Neurological:  Negative for brief paralysis, focal weakness, headaches, light-headedness, loss of balance and weakness. Dizziness: OCC.  Psychiatric/Behavioral:  Negative for altered mental status and depression.       Objective:      Vitals:    11/02/22 1056 11/02/22 1120   BP: (!) 160/83 132/76   Pulse: 87    Weight: 79.7 kg (175 lb 11.3 oz)    Height: 5' 6" (1.676 m)    PainSc: 0-No pain      Body mass index is 28.36 kg/m².    Physical Exam  Constitutional:       Appearance: Normal appearance. She is overweight.   HENT:      Head: Normocephalic and atraumatic.   Eyes:      Extraocular Movements: Extraocular movements intact.      Conjunctiva/sclera: Conjunctivae normal.   Neck:      Thyroid: No thyromegaly.      Vascular: Normal carotid pulses. No carotid bruit, hepatojugular reflux or JVD.      Trachea: Trachea normal. No tracheal deviation.   Cardiovascular:      Rate and Rhythm: Normal rate and regular rhythm.      Pulses:           Carotid pulses are 2+ on the right side and 2+ on the left side.       Radial pulses are 2+ on the right side and 2+ on the left side.        Posterior tibial pulses are 2+ on the right side and 2+ on the left side.      Heart sounds: Murmur heard.   Systolic murmur is present with a grade of 2/6 at the lower left sternal border.     No friction rub. " No gallop. No S4 sounds.   Pulmonary:      Effort: Pulmonary effort is normal.      Breath sounds: Normal breath sounds. No rales.   Abdominal:      General: Bowel sounds are normal.      Palpations: Abdomen is soft.      Tenderness: There is no abdominal tenderness.   Musculoskeletal:         General: Normal range of motion.      Cervical back: Neck supple. No edema or erythema.      Right lower leg: No edema.      Left lower leg: No edema.   Skin:     General: Skin is warm and dry.      Capillary Refill: Capillary refill takes less than 2 seconds.   Neurological:      General: No focal deficit present.      Mental Status: She is alert and oriented to person, place, and time.      Cranial Nerves: No cranial nerve deficit.      Motor: No tremor or abnormal muscle tone.   Psychiatric:         Mood and Affect: Mood normal.         Speech: Speech normal.         Behavior: Behavior normal.             ..    Chemistry        Component Value Date/Time     09/03/2022 0730    K 4.1 09/03/2022 0730     09/03/2022 0730    CO2 23 09/03/2022 0730    BUN 12 09/03/2022 0730    CREATININE 0.89 09/03/2022 0730    GLU 95 09/03/2022 0730        Component Value Date/Time    CALCIUM 10.1 09/03/2022 0730    ALKPHOS 83 09/03/2022 0730    AST 44 (H) 09/03/2022 0730    ALT 26 09/03/2022 0730    BILITOT 0.5 09/03/2022 0730    ESTGFRAFRICA >60 02/15/2022 0818    EGFRNONAA >60 02/15/2022 0818            ..  Lab Results   Component Value Date    CHOL 170 08/12/2022     Lab Results   Component Value Date    HDL 52 08/12/2022     Lab Results   Component Value Date    LDLCALC 104.2 08/12/2022     Lab Results   Component Value Date    TRIG 69 08/12/2022     Lab Results   Component Value Date    CHOLHDL 30.6 08/12/2022     ..  Lab Results   Component Value Date    WBC 6.10 09/03/2022    HGB 12.0 09/03/2022    HCT 38.0 09/03/2022    MCV 90 09/03/2022     09/03/2022       Test(s) Reviewed  I have reviewed the following in  detail:  [] Stress test   [] Angiography   [x] Echocardiogram   [x] Labs   [x] Other:       Assessment:         ICD-10-CM ICD-9-CM   1. Mitral regurgitation and aortic stenosis  I08.0 396.2   2. Pulmonary hypertension  I27.20 416.8   3. Coronary artery disease of native artery of native heart with stable angina pectoris  I25.118 414.01     413.9   4. Primary hypertension  I10 401.9   5. Overweight (BMI 25.0-29.9)  E66.3 278.02   6. Carotid artery plaque, left  I65.22 433.10   7. Hypercholesterolemia  E78.00 272.0     Problem List Items Addressed This Visit          Pulmonary    Pulmonary hypertension    Relevant Orders    Comprehensive Metabolic Panel       Cardiac/Vascular    Primary hypertension    Relevant Orders    Comprehensive Metabolic Panel    Hypercholesterolemia    Relevant Orders    Comprehensive Metabolic Panel    Coronary artery disease of native artery of native heart with stable angina pectoris    Relevant Orders    Comprehensive Metabolic Panel    Mitral regurgitation and aortic stenosis - Primary    Carotid artery plaque, left       Endocrine    Overweight (BMI 25.0-29.9)        Plan:         ALL CV CLINICALLY STABLE, CLASS 1-2 ANGINA, NO HF, NO TIA, NO CLINICAL ARRHYTHMIA,CONTINUE CURRENT MEDS, EDUCATION, DIET, EXERCISE , WEIGHT LOST SIGNIFICANT IMPROVEMENT AND PULMONARY HYPERTENSION CURRENT MEDICAL TREATMENT WILL CONTINUE, RETURN TO CLINIC IN FEW MONTHS WITH LABS  Mitral regurgitation and aortic stenosis    Pulmonary hypertension  Comments:  IMPROVED  Orders:  -     Comprehensive Metabolic Panel; Future; Expected date: 05/02/2023    Coronary artery disease of native artery of native heart with stable angina pectoris  -     Comprehensive Metabolic Panel; Future; Expected date: 05/02/2023    Primary hypertension  -     Comprehensive Metabolic Panel; Future; Expected date: 05/02/2023    Overweight (BMI 25.0-29.9)    Carotid artery plaque, left    Hypercholesterolemia  -     Comprehensive Metabolic  Panel; Future; Expected date: 05/02/2023  RTC Low level/low impact aerobic exercise 5x's/wk. Heart healthy diet and risk factor modification.    See labs and med orders.    Aerobic exercise 5x's/wk. Heart healthy diet and risk factor modification.    See labs and med orders.

## 2022-11-14 ENCOUNTER — HOSPITAL ENCOUNTER (OUTPATIENT)
Dept: RADIOLOGY | Facility: HOSPITAL | Age: 72
Discharge: HOME | End: 2022-11-14
Attending: OTOLARYNGOLOGY
Payer: MEDICARE

## 2022-11-14 DIAGNOSIS — E04.2 MULTIPLE THYROID NODULES: ICD-10-CM

## 2022-11-14 DIAGNOSIS — E04.1 THYROID NODULE: ICD-10-CM

## 2022-11-14 PROCEDURE — 88173 PR  INTERPRETATION OF FNA SMEAR: ICD-10-PCS | Mod: 26,,, | Performed by: STUDENT IN AN ORGANIZED HEALTH CARE EDUCATION/TRAINING PROGRAM

## 2022-11-14 PROCEDURE — 10006 FNA BX W/US GDN EA ADDL: CPT | Mod: ,,, | Performed by: RADIOLOGY

## 2022-11-14 PROCEDURE — 25000003 PHARM REV CODE 250: Mod: PO | Performed by: OTOLARYNGOLOGY

## 2022-11-14 PROCEDURE — 88173 CYTOPATH EVAL FNA REPORT: CPT | Performed by: STUDENT IN AN ORGANIZED HEALTH CARE EDUCATION/TRAINING PROGRAM

## 2022-11-14 PROCEDURE — 88173 CYTOPATH EVAL FNA REPORT: CPT | Mod: 59 | Performed by: PATHOLOGY

## 2022-11-14 PROCEDURE — 10005 FNA BX W/US GDN 1ST LES: CPT | Mod: ,,, | Performed by: RADIOLOGY

## 2022-11-14 PROCEDURE — 88173 CYTOPATH EVAL FNA REPORT: CPT | Mod: 26,59,, | Performed by: PATHOLOGY

## 2022-11-14 PROCEDURE — 10006 FNA BX W/US GDN EA ADDL: CPT | Mod: PO

## 2022-11-14 PROCEDURE — 10005 US FINE NEEDLE ASPIRATION THYROID, FIRST LESION: ICD-10-PCS | Mod: ,,, | Performed by: RADIOLOGY

## 2022-11-14 PROCEDURE — 10006 PR FINE NEEDLE ASP BIOPSY, W/US GUIDANCE, EA ADDTL LESION: ICD-10-PCS | Mod: ,,, | Performed by: RADIOLOGY

## 2022-11-14 PROCEDURE — 88173 PR  INTERPRETATION OF FNA SMEAR: ICD-10-PCS | Mod: 26,59,, | Performed by: PATHOLOGY

## 2022-11-14 PROCEDURE — 10005 FNA BX W/US GDN 1ST LES: CPT | Mod: PO

## 2022-11-14 PROCEDURE — 88173 CYTOPATH EVAL FNA REPORT: CPT | Mod: 26,,, | Performed by: STUDENT IN AN ORGANIZED HEALTH CARE EDUCATION/TRAINING PROGRAM

## 2022-11-14 RX ORDER — LIDOCAINE HYDROCHLORIDE 10 MG/ML
10 INJECTION INFILTRATION; PERINEURAL ONCE
Status: COMPLETED | OUTPATIENT
Start: 2022-11-14 | End: 2022-11-14

## 2022-11-14 RX ADMIN — LIDOCAINE HYDROCHLORIDE 10 ML: 10 INJECTION, SOLUTION INFILTRATION; PERINEURAL at 09:11

## 2022-11-17 ENCOUNTER — TELEPHONE (OUTPATIENT)
Dept: OTOLARYNGOLOGY | Facility: CLINIC | Age: 72
End: 2022-11-17
Payer: MEDICARE

## 2022-11-17 DIAGNOSIS — E04.2 MULTIPLE THYROID NODULES: Primary | ICD-10-CM

## 2022-11-17 LAB
FINAL PATHOLOGIC DIAGNOSIS: NORMAL
FINAL PATHOLOGIC DIAGNOSIS: NORMAL
Lab: NORMAL
Lab: NORMAL
MICROSCOPIC EXAM: NORMAL

## 2022-11-17 NOTE — TELEPHONE ENCOUNTER
----- Message from Guido Montero MD sent at 11/17/2022  1:21 PM CST -----  Please let pt know that biopsied are benign. No further testing needed for this. No further imaging needed.

## 2022-12-20 ENCOUNTER — SPECIALTY PHARMACY (OUTPATIENT)
Dept: PHARMACY | Facility: CLINIC | Age: 72
End: 2022-12-20
Payer: MEDICARE

## 2022-12-20 NOTE — TELEPHONE ENCOUNTER
Specialty Pharmacy - Refill Coordination    Specialty Medication Orders Linked to Encounter      Flowsheet Row Most Recent Value   Medication #1 sildenafil (REVATIO) 20 mg Tab (Order#[id number], Rx#[id number])            Refill Questions - Documented Responses      Flowsheet Row Most Recent Value   Patient Availability and HIPAA Verification    Does patient want to proceed with activity? Yes   HIPAA/medical authority confirmed? Yes   Relationship to patient of person spoken to? Self   Refill Screening Questions    Changes to allergies? No   Changes to medications? No   New conditions since last clinic visit? No   Unplanned office visit, urgent care, ED, or hospital admission in the last 4 weeks? No   How does patient/caregiver feel medication is working? Good   Financial problems or insurance changes? No   How many doses of your specialty medications were missed in the last 4 weeks? 0   Would patient like to speak to a pharmacist? No   When does the patient need to receive the medication? 12/27/22   Refill Delivery Questions    How will the patient receive the medication? MEDRx   When does the patient need to receive the medication? 12/27/22   Shipping Address Home   Address in [facility] confirmed and updated if neccessary? Yes   Expected Copay ($) 3.95  [[facility] pt mails in payments]   Is the patient able to afford the medication copay? Yes   Payment Method invoice (approval required)   Days supply of Refill 90   Supplies needed? No supplies needed   Refill activity completed? Yes   Refill activity plan Refill scheduled   Shipment/Pickup Date: 12/22/22            Current Outpatient Medications   Medication Sig    albuterol (PROVENTIL/VENTOLIN HFA) 90 mcg/actuation inhaler Inhale 1-2 puffs into the lungs every 6 (six) hours as needed for Wheezing. Rescue    atenoloL (TENORMIN) 50 MG tablet Take 1 tablet (50 mg total) by mouth once daily.    cetirizine (ZYRTEC) 10 MG tablet Take 10 mg by mouth once daily.     ergocalciferol (ERGOCALCIFEROL) 50,000 unit Cap Take 1 capsule (50,000 Units total) by mouth every Monday.    fluticasone propionate (FLONASE) 50 mcg/actuation nasal spray 2 sprays (100 mcg total) by Each Nostril route once daily.    hydrALAZINE (APRESOLINE) 25 MG tablet Take 1 tablet (25 mg total) by mouth 3 (three) times daily.    hydroCHLOROthiazide (HYDRODIURIL) 12.5 MG Tab Take 1 tablet (12.5 mg total) by mouth once daily.    KLOR-CON 10 10 mEq TbSR TAKE 1 TABLET BY MOUTH ONCE DAILY    magnesium oxide (MAG-OX) 400 mg (241.3 mg magnesium) tablet Take 1 tablet (400 mg total) by mouth once daily.    omeprazole (PRILOSEC) 40 MG capsule Take 1 capsule (40 mg total) by mouth once daily.    pravastatin (PRAVACHOL) 40 MG tablet TAKE 1 TABLET BY MOUTH EVERY DAY IN THE EVENING    sildenafil (REVATIO) 20 mg Tab Take 1 tablet (20 mg total) by mouth 3 (three) times daily.    sucralfate (CARAFATE) 1 gram tablet Take 1 g by mouth every evening.    umeclidinium-vilanteroL (ANORO ELLIPTA) 62.5-25 mcg/actuation DsDv Inhale 1 puff into the lungs as needed.   Last reviewed on 11/17/2022  1:21 PM by Guido Guevara MD    Review of patient's allergies indicates:   Allergen Reactions    Lisinopril Other (See Comments)     Other reaction(s): Cough      Cyclobenzaprine Rash    Losartan Nausea Only     Other reaction(s): Unknown      Last reviewed on  11/17/2022 1:21 PM by Guido Guevara      Tasks added this encounter   3/13/2023 - Refill Call (Auto Added)   Tasks due within next 3 months   No tasks due.     [facility] - [facility]  [address] [place] [id number]  Phone: [phone]  Fax: [phone]

## 2023-03-13 ENCOUNTER — SPECIALTY PHARMACY (OUTPATIENT)
Dept: PHARMACY | Facility: CLINIC | Age: 73
End: 2023-03-13
Payer: MEDICARE

## 2023-03-13 NOTE — TELEPHONE ENCOUNTER
Specialty Pharmacy - Refill Coordination  Specialty Pharmacy - Medication/Referral Authorization    Specialty Medication Orders Linked to Encounter      Flowsheet Row Most Recent Value   Medication #1 sildenafil (REVATIO) 20 mg Tab (Order#[id number], Rx#[id number])            Refill Questions - Documented Responses      Flowsheet Row Most Recent Value   Patient Availability and HIPAA Verification    Does patient want to proceed with activity? Yes   HIPAA/medical authority confirmed? Yes   Relationship to patient of person spoken to? Self   Refill Screening Questions    Changes to allergies? No   Changes to medications? No   New conditions since last clinic visit? No   Unplanned office visit, urgent care, ED, or hospital admission in the last 4 weeks? No   How does patient/caregiver feel medication is working? Good   Financial problems or insurance changes? No   How many doses of your specialty medications were missed in the last 4 weeks? 3   Why were doses missed? Other (comment)  [Pt ran out of medication. She is to resume normal dosing schedule when delivery is received on Wed and should not double up on doses to make up for missed ones.]   When does the patient need to receive the medication? 03/15/23   Refill Delivery Questions    How will the patient receive the medication? MEDRx   When does the patient need to receive the medication? 03/15/23   Shipping Address Home   Address in [facility] confirmed and updated if neccessary? Yes   Expected Copay ($) 0   Is the patient able to afford the medication copay? Yes   Payment Method zero copay   Days supply of Refill 30   Supplies needed? No supplies needed   Refill activity completed? Yes   Refill activity plan Refill scheduled   Shipment/Pickup Date: 03/14/23            Current Outpatient Medications   Medication Sig    albuterol (PROVENTIL/VENTOLIN HFA) 90 mcg/actuation inhaler Inhale 1-2 puffs into the lungs every 6 (six) hours as needed for Wheezing.  Rescue    atenoloL (TENORMIN) 50 MG tablet Take 1 tablet (50 mg total) by mouth once daily.    cetirizine (ZYRTEC) 10 MG tablet Take 10 mg by mouth once daily.    ergocalciferol (ERGOCALCIFEROL) 50,000 unit Cap Take 1 capsule (50,000 Units total) by mouth every Monday.    fluticasone propionate (FLONASE) 50 mcg/actuation nasal spray 2 sprays (100 mcg total) by Each Nostril route once daily.    hydrALAZINE (APRESOLINE) 25 MG tablet Take 1 tablet (25 mg total) by mouth 3 (three) times daily.    hydroCHLOROthiazide (HYDRODIURIL) 12.5 MG Tab Take 1 tablet (12.5 mg total) by mouth once daily.    KLOR-CON 10 10 mEq TbSR TAKE 1 TABLET BY MOUTH ONCE DAILY    magnesium oxide (MAG-OX) 400 mg (241.3 mg magnesium) tablet Take 1 tablet (400 mg total) by mouth once daily.    omeprazole (PRILOSEC) 40 MG capsule Take 1 capsule (40 mg total) by mouth once daily.    pravastatin (PRAVACHOL) 40 MG tablet TAKE 1 TABLET BY MOUTH EVERY DAY IN THE EVENING    sildenafil (REVATIO) 20 mg Tab Take 1 tablet (20 mg total) by mouth 3 (three) times daily.    sucralfate (CARAFATE) 1 gram tablet Take 1 g by mouth every evening.    umeclidinium-vilanteroL (ANORO ELLIPTA) 62.5-25 mcg/actuation DsDv Inhale 1 puff into the lungs as needed.   Last reviewed on 11/17/2022  1:21 PM by Guido Guevara MD    Review of patient's allergies indicates:   Allergen Reactions    Lisinopril Other (See Comments)     Other reaction(s): Cough      Cyclobenzaprine Rash    Losartan Nausea Only     Other reaction(s): Unknown      Last reviewed on  11/17/2022 1:21 PM by Guido Guevara      Tasks added this encounter   4/7/2023 - Refill Call (Auto Added)   Tasks due within next 3 months   No tasks due.     Joselyn Jensen, PharmD  [facility]  [address] [place] [id number]  Phone: [phone]  Fax: [phone]

## 2023-03-13 NOTE — TELEPHONE ENCOUNTER
Urgent Sildenafil PA submitted via [facility] (Key: [id number] - PA Case ID: [id number]). PA Case: [id number], Status: Approved, Coverage Starts on: 1/1/2023 12:00:00 AM, Coverage Ends on: 12/31/2023 12:00:00 AM.    Sildenafil processing for $0 copay. Benefits investigation not required (Human Medicare D - LIS LVL 1)

## 2023-03-13 NOTE — TELEPHONE ENCOUNTER
Outgoing call regarding revatio refill; per pt, she's out of meds; informed her that a PA is required, and once approved [facility] will follow up to schedule delivery; routed to katherine DODSON

## 2023-04-06 ENCOUNTER — LAB VISIT (OUTPATIENT)
Dept: PRIMARY CARE CLINIC | Facility: CLINIC | Age: 73
End: 2023-04-06
Payer: MEDICARE

## 2023-04-06 DIAGNOSIS — E78.00 HYPERCHOLESTEROLEMIA: ICD-10-CM

## 2023-04-06 DIAGNOSIS — I27.20 PULMONARY HYPERTENSION: Chronic | ICD-10-CM

## 2023-04-06 DIAGNOSIS — I25.118 CORONARY ARTERY DISEASE OF NATIVE ARTERY OF NATIVE HEART WITH STABLE ANGINA PECTORIS: Chronic | ICD-10-CM

## 2023-04-06 DIAGNOSIS — I10 PRIMARY HYPERTENSION: Chronic | ICD-10-CM

## 2023-04-06 LAB
ALBUMIN SERPL BCP-MCNC: 3.4 G/DL (ref 3.5–5.2)
ALP SERPL-CCNC: 102 U/L (ref 55–135)
ALT SERPL W/O P-5'-P-CCNC: 14 U/L (ref 10–44)
ANION GAP SERPL CALC-SCNC: 8 MMOL/L (ref 8–16)
AST SERPL-CCNC: 20 U/L (ref 10–40)
BILIRUB SERPL-MCNC: 0.6 MG/DL (ref 0.1–1)
BUN SERPL-MCNC: 15 MG/DL (ref 8–23)
CALCIUM SERPL-MCNC: 10.8 MG/DL (ref 8.7–10.5)
CHLORIDE SERPL-SCNC: 111 MMOL/L (ref 95–110)
CO2 SERPL-SCNC: 26 MMOL/L (ref 23–29)
CREAT SERPL-MCNC: 1.1 MG/DL (ref 0.5–1.4)
EST. GFR  (NO RACE VARIABLE): 52.7 ML/MIN/1.73 M^2
GLUCOSE SERPL-MCNC: 99 MG/DL (ref 70–110)
POTASSIUM SERPL-SCNC: 4.5 MMOL/L (ref 3.5–5.1)
PROT SERPL-MCNC: 6.6 G/DL (ref 6–8.4)
SODIUM SERPL-SCNC: 145 MMOL/L (ref 136–145)

## 2023-04-06 PROCEDURE — 36415 COLL VENOUS BLD VENIPUNCTURE: CPT | Mod: S$GLB,,, | Performed by: INTERNAL MEDICINE

## 2023-04-06 PROCEDURE — 36415 PR COLLECTION VENOUS BLOOD,VENIPUNCTURE: ICD-10-PCS | Mod: S$GLB,,, | Performed by: INTERNAL MEDICINE

## 2023-04-06 PROCEDURE — 80053 COMPREHEN METABOLIC PANEL: CPT | Performed by: INTERNAL MEDICINE

## 2023-04-10 ENCOUNTER — SPECIALTY PHARMACY (OUTPATIENT)
Dept: PHARMACY | Facility: CLINIC | Age: 73
End: 2023-04-10
Payer: MEDICARE

## 2023-04-10 NOTE — TELEPHONE ENCOUNTER
Outgoing call regarding Sildenafil refill. Pt stated she still has the bottle we sent her in March and that [facility] sent her some Sildenafil also and pt provided phone number [phone]. Routing to assigned pharmacist.

## 2023-04-10 NOTE — TELEPHONE ENCOUNTER
Per dispense history, Sildenafil was filled at [facility] on 12/22/22 for 90 DS and on 3/14/23 for 30 DS. Claim is also going through insurance without a RTS rejection. Intervention opened to check back in with patient to ensure she received Sildenafil from [facility].

## 2023-04-11 NOTE — PROGRESS NOTES
Subjective:    Patient ID:  Aleida Bliss is a 73 y.o. female who presents for Hypertension, Valvular Heart Disease, and Shortness of Breath        HPI  DISCUSSED LABS AND GOALS CMP GFR 53, STILL WITH ARTHRITIC PAIN, BP OK, TOOK SOME COUGH DROPS, , FEET---, SEE ROS    Past Medical History:   Diagnosis Date    Arthritis     Coronary artery disease     GERD (gastroesophageal reflux disease)     Hypertension     SOB (shortness of breath)      Past Surgical History:   Procedure Laterality Date    CATARACT EXTRACTION EXTRACAPSULAR W/ INTRAOCULAR LENS IMPLANTATION      CATHETERIZATION OF BOTH LEFT AND RIGHT HEART N/A 02/15/2022    Procedure: CATHETERIZATION, HEART, BOTH LEFT AND RIGHT;  Surgeon: Cuong Hinkle MD;  Location: [facility] CATH;  Service: Cardiology;  Laterality: N/A;    CHOLECYSTECTOMY      COLONOSCOPY      COLONOSCOPY N/A 3/15/2022    Procedure: COLONOSCOPY;  Surgeon: Varun Toro MD;  Location: [facility] ENDO;  Service: Endoscopy;  Laterality: N/A;    HYSTERECTOMY       Family History   Problem Relation Age of Onset    Heart disease Mother     Cancer Father         prostate     Social History     Socioeconomic History    Marital status:    Tobacco Use    Smoking status: Never    Smokeless tobacco: Never   Substance and Sexual Activity    Alcohol use: No    Drug use: No    Sexual activity: Not Currently       Review of patient's allergies indicates:   Allergen Reactions    Lisinopril Other (See Comments)     Other reaction(s): Cough      Cyclobenzaprine Rash    Losartan Nausea Only     Other reaction(s): Unknown         Current Outpatient Medications:     albuterol (PROVENTIL/VENTOLIN HFA) 90 mcg/actuation inhaler, Inhale 1-2 puffs into the lungs every 6 (six) hours as needed for Wheezing. Rescue, Disp: 8 g, Rfl: 0    atenoloL (TENORMIN) 50 MG tablet, Take 1 tablet (50 mg total) by mouth once daily., Disp: 90 tablet, Rfl: 3    cetirizine (ZYRTEC) 10 MG tablet, Take 10 mg by mouth once daily., Disp: ,  Rfl:     ergocalciferol (ERGOCALCIFEROL) 50,000 unit Cap, Take 1 capsule (50,000 Units total) by mouth every Monday., Disp: 12 capsule, Rfl: 3    fluticasone propionate (FLONASE) 50 mcg/actuation nasal spray, 2 sprays (100 mcg total) by Each Nostril route once daily., Disp: 3 g, Rfl: 3    hydroCHLOROthiazide (HYDRODIURIL) 12.5 MG Tab, Take 1 tablet (12.5 mg total) by mouth once daily., Disp: 90 tablet, Rfl: 3    magnesium oxide (MAG-OX) 400 mg (241.3 mg magnesium) tablet, Take 1 tablet (400 mg total) by mouth once daily., Disp: 90 tablet, Rfl: 3    omeprazole (PRILOSEC) 40 MG capsule, Take 1 capsule (40 mg total) by mouth once daily., Disp: 90 capsule, Rfl: 3    sildenafil (REVATIO) 20 mg Tab, Take 1 tablet (20 mg total) by mouth 3 (three) times daily., Disp: 270 tablet, Rfl: 3    sucralfate (CARAFATE) 1 gram tablet, Take 1 g by mouth every evening., Disp: , Rfl:     umeclidinium-vilanteroL (ANORO ELLIPTA) 62.5-25 mcg/actuation DsDv, Inhale 1 puff into the lungs as needed., Disp: , Rfl:     hydrALAZINE (APRESOLINE) 25 MG tablet, Take 1 tablet (25 mg total) by mouth 3 (three) times daily., Disp: 180 tablet, Rfl: 1    potassium chloride (KLOR-CON 10) 10 MEQ TbSR, Take 1 tablet (10 mEq total) by mouth once daily., Disp: 90 tablet, Rfl: 1    pravastatin (PRAVACHOL) 40 MG tablet, Take 1 tablet (40 mg total) by mouth every evening., Disp: 90 tablet, Rfl: 1    Review of Systems   Constitutional: Negative for chills, decreased appetite, diaphoresis, fever, malaise/fatigue and night sweats.   HENT:  Negative for congestion and nosebleeds.    Cardiovascular:  Positive for dyspnea on exertion (MILD). Negative for chest pain, claudication, cyanosis, irregular heartbeat, leg swelling, near-syncope, orthopnea, palpitations, paroxysmal nocturnal dyspnea and syncope.   Respiratory:  Negative for cough (DRY), hemoptysis, shortness of breath and wheezing.    Hematologic/Lymphatic: Negative for adenopathy. Does not bruise/bleed  "easily.   Skin:  Negative for color change and itching.   Musculoskeletal:  Positive for arthritis and joint pain. Negative for back pain and falls.   Gastrointestinal:  Negative for abdominal pain, change in bowel habit, jaundice, melena and nausea.   Genitourinary:  Positive for nocturia. Negative for dysuria and flank pain.   Neurological:  Negative for brief paralysis, focal weakness, headaches, light-headedness, loss of balance, paresthesias and weakness. Dizziness: OCC.  Psychiatric/Behavioral:  Negative for altered mental status and depression.    Allergic/Immunologic: Negative for persistent infections.      Objective:      Vitals:    04/12/23 0819 04/12/23 0859   BP: (!) 174/79 134/68   Pulse: 63    Weight: 80.9 kg (178 lb 5.6 oz)    Height: 5' 6" (1.676 m)    PainSc: 0-No pain      Body mass index is 28.79 kg/m².    Physical Exam  Constitutional:       Appearance: Normal appearance. She is overweight.   HENT:      Head: Normocephalic and atraumatic.   Eyes:      Extraocular Movements: Extraocular movements intact.      Conjunctiva/sclera: Conjunctivae normal.   Neck:      Thyroid: No thyromegaly.      Vascular: Normal carotid pulses. No carotid bruit, hepatojugular reflux or JVD.      Trachea: Trachea normal. No tracheal deviation.   Cardiovascular:      Rate and Rhythm: Normal rate and regular rhythm.      Pulses:           Carotid pulses are 2+ on the right side and 2+ on the left side.       Radial pulses are 2+ on the right side and 2+ on the left side.        Posterior tibial pulses are 2+ on the right side and 2+ on the left side.      Heart sounds: Murmur heard.   Systolic murmur is present with a grade of 2/6.     No friction rub. No gallop. No S4 sounds.   Pulmonary:      Effort: Pulmonary effort is normal.      Breath sounds: Normal breath sounds and air entry. No rales.   Abdominal:      General: Bowel sounds are normal.      Palpations: Abdomen is soft.      Tenderness: There is no abdominal " tenderness.   Musculoskeletal:         General: Normal range of motion.      Cervical back: Neck supple. No edema or erythema.      Right lower leg: No edema.      Left lower leg: No edema.   Skin:     General: Skin is warm and dry.      Capillary Refill: Capillary refill takes less than 2 seconds.   Neurological:      General: No focal deficit present.      Mental Status: She is alert and oriented to person, place, and time.      Cranial Nerves: Cranial nerves 2-12 are intact.   Psychiatric:         Mood and Affect: Mood normal.         Speech: Speech normal.         Behavior: Behavior normal.               ..    Chemistry        Component Value Date/Time     04/06/2023 0852    K 4.5 04/06/2023 0852     (H) 04/06/2023 0852    CO2 26 04/06/2023 0852    BUN 15 04/06/2023 0852    CREATININE 1.1 04/06/2023 0852    GLU 99 04/06/2023 0852        Component Value Date/Time    CALCIUM 10.8 (H) 04/06/2023 0852    ALKPHOS 102 04/06/2023 0852    AST 20 04/06/2023 0852    ALT 14 04/06/2023 0852    BILITOT 0.6 04/06/2023 0852    ESTGFRAFRICA >60 02/15/2022 0818    EGFRNONAA >60 02/15/2022 0818            ..  Lab Results   Component Value Date    CHOL 170 08/12/2022     Lab Results   Component Value Date    HDL 52 08/12/2022     Lab Results   Component Value Date    LDLCALC 104.2 08/12/2022     Lab Results   Component Value Date    TRIG 69 08/12/2022     Lab Results   Component Value Date    CHOLHDL 30.6 08/12/2022     ..  Lab Results   Component Value Date    WBC 6.10 09/03/2022    HGB 12.0 09/03/2022    HCT 38.0 09/03/2022    MCV 90 09/03/2022     09/03/2022       Test(s) Reviewed  I have reviewed the following in detail:  [] Stress test   [] Angiography   [] Echocardiogram   [x] Labs   [] Other:       Assessment:         ICD-10-CM ICD-9-CM   1. Pulmonary hypertension  I27.20 416.8   2. Coronary artery disease of native artery of native heart with stable angina pectoris  I25.118 414.01     413.9   3. Mild  carotid artery disease  I77.9 447.9   4. Mitral regurgitation and aortic stenosis  I08.0 396.2   5. Overweight (BMI 25.0-29.9)  E66.3 278.02   6. Hypercholesterolemia  E78.00 272.0   7. Primary hypertension  I10 401.9   8. Hypomagnesemia  E83.42 275.2     Problem List Items Addressed This Visit          Pulmonary    Pulmonary hypertension - Primary    Relevant Orders    Echo       Cardiac/Vascular    Mild carotid artery disease (Chronic)    Primary hypertension    Relevant Medications    hydrALAZINE (APRESOLINE) 25 MG tablet    Hypercholesterolemia    Coronary artery disease of native artery of native heart with stable angina pectoris    Mitral regurgitation and aortic stenosis    Relevant Orders    Echo       Renal/    Hypomagnesemia       Endocrine    Overweight (BMI 25.0-29.9)        Plan:     OVER THE COUNTER HBP MEDS ONLY, EXPLAINED EFFECT ON THE BLOOD PRESSURE,ALL CV CLINICALLY STABLE, NO ANGINA, NO OVERT HF, NO TIA, NO CLINICAL ARRHYTHMIA,CONTINUE CURRENT MEDS, EDUCATION, DIET, EXERCISE , WEIGHT LOSS RETURN TO CLINIC IN 6 MONTHS WITH ECHO REASSESS PA PRESSURE AND MR ETCETERA       Pulmonary hypertension  -     Echo; Future; Expected date: 10/12/2023    Coronary artery disease of native artery of native heart with stable angina pectoris    Mild carotid artery disease    Mitral regurgitation and aortic stenosis  -     Echo; Future; Expected date: 10/12/2023    Overweight (BMI 25.0-29.9)    Hypercholesterolemia    Primary hypertension  -     hydrALAZINE (APRESOLINE) 25 MG tablet; Take 1 tablet (25 mg total) by mouth 3 (three) times daily.  Dispense: 180 tablet; Refill: 1    Hypomagnesemia    Other orders  -     pravastatin (PRAVACHOL) 40 MG tablet; Take 1 tablet (40 mg total) by mouth every evening.  Dispense: 90 tablet; Refill: 1  -     potassium chloride (KLOR-CON 10) 10 MEQ TbSR; Take 1 tablet (10 mEq total) by mouth once daily.  Dispense: 90 tablet; Refill: 1    RTC Low level/low impact aerobic exercise  5x's/wk. Heart healthy diet and risk factor modification.    See labs and med orders.    Aerobic exercise 5x's/wk. Heart healthy diet and risk factor modification.    See labs and med orders.

## 2023-04-12 ENCOUNTER — OFFICE VISIT (OUTPATIENT)
Dept: CARDIOLOGY | Facility: CLINIC | Age: 73
End: 2023-04-12
Payer: MEDICARE

## 2023-04-12 VITALS
BODY MASS INDEX: 28.67 KG/M2 | HEART RATE: 63 BPM | WEIGHT: 178.38 LBS | HEIGHT: 66 IN | DIASTOLIC BLOOD PRESSURE: 68 MMHG | SYSTOLIC BLOOD PRESSURE: 134 MMHG

## 2023-04-12 DIAGNOSIS — I25.118 CORONARY ARTERY DISEASE OF NATIVE ARTERY OF NATIVE HEART WITH STABLE ANGINA PECTORIS: Chronic | ICD-10-CM

## 2023-04-12 DIAGNOSIS — E66.3 OVERWEIGHT (BMI 25.0-29.9): Chronic | ICD-10-CM

## 2023-04-12 DIAGNOSIS — E78.00 HYPERCHOLESTEROLEMIA: ICD-10-CM

## 2023-04-12 DIAGNOSIS — I27.20 PULMONARY HYPERTENSION: Primary | Chronic | ICD-10-CM

## 2023-04-12 DIAGNOSIS — I10 PRIMARY HYPERTENSION: Chronic | ICD-10-CM

## 2023-04-12 DIAGNOSIS — E83.42 HYPOMAGNESEMIA: ICD-10-CM

## 2023-04-12 DIAGNOSIS — I08.0 MITRAL REGURGITATION AND AORTIC STENOSIS: Chronic | ICD-10-CM

## 2023-04-12 DIAGNOSIS — I77.9 MILD CAROTID ARTERY DISEASE: Chronic | ICD-10-CM

## 2023-04-12 PROBLEM — R09.89 BRUIT OF LEFT CAROTID ARTERY: Status: RESOLVED | Noted: 2022-06-29 | Resolved: 2023-04-12

## 2023-04-12 PROCEDURE — 3008F PR BODY MASS INDEX (BMI) DOCUMENTED: ICD-10-PCS | Mod: CPTII,S$GLB,, | Performed by: INTERNAL MEDICINE

## 2023-04-12 PROCEDURE — 1101F PT FALLS ASSESS-DOCD LE1/YR: CPT | Mod: CPTII,S$GLB,, | Performed by: INTERNAL MEDICINE

## 2023-04-12 PROCEDURE — 99214 OFFICE O/P EST MOD 30 MIN: CPT | Mod: S$GLB,,, | Performed by: INTERNAL MEDICINE

## 2023-04-12 PROCEDURE — 3075F PR MOST RECENT SYSTOLIC BLOOD PRESS GE 130-139MM HG: ICD-10-PCS | Mod: CPTII,S$GLB,, | Performed by: INTERNAL MEDICINE

## 2023-04-12 PROCEDURE — 3078F DIAST BP <80 MM HG: CPT | Mod: CPTII,S$GLB,, | Performed by: INTERNAL MEDICINE

## 2023-04-12 PROCEDURE — 99214 PR OFFICE/OUTPT VISIT, EST, LEVL IV, 30-39 MIN: ICD-10-PCS | Mod: S$GLB,,, | Performed by: INTERNAL MEDICINE

## 2023-04-12 PROCEDURE — 3075F SYST BP GE 130 - 139MM HG: CPT | Mod: CPTII,S$GLB,, | Performed by: INTERNAL MEDICINE

## 2023-04-12 PROCEDURE — 3288F PR FALLS RISK ASSESSMENT DOCUMENTED: ICD-10-PCS | Mod: CPTII,S$GLB,, | Performed by: INTERNAL MEDICINE

## 2023-04-12 PROCEDURE — 3078F PR MOST RECENT DIASTOLIC BLOOD PRESSURE < 80 MM HG: ICD-10-PCS | Mod: CPTII,S$GLB,, | Performed by: INTERNAL MEDICINE

## 2023-04-12 PROCEDURE — 3288F FALL RISK ASSESSMENT DOCD: CPT | Mod: CPTII,S$GLB,, | Performed by: INTERNAL MEDICINE

## 2023-04-12 PROCEDURE — 1159F PR MEDICATION LIST DOCUMENTED IN MEDICAL RECORD: ICD-10-PCS | Mod: CPTII,S$GLB,, | Performed by: INTERNAL MEDICINE

## 2023-04-12 PROCEDURE — 1101F PR PT FALLS ASSESS DOC 0-1 FALLS W/OUT INJ PAST YR: ICD-10-PCS | Mod: CPTII,S$GLB,, | Performed by: INTERNAL MEDICINE

## 2023-04-12 PROCEDURE — 1126F AMNT PAIN NOTED NONE PRSNT: CPT | Mod: CPTII,S$GLB,, | Performed by: INTERNAL MEDICINE

## 2023-04-12 PROCEDURE — 3008F BODY MASS INDEX DOCD: CPT | Mod: CPTII,S$GLB,, | Performed by: INTERNAL MEDICINE

## 2023-04-12 PROCEDURE — 1159F MED LIST DOCD IN RCRD: CPT | Mod: CPTII,S$GLB,, | Performed by: INTERNAL MEDICINE

## 2023-04-12 PROCEDURE — 1126F PR PAIN SEVERITY QUANTIFIED, NO PAIN PRESENT: ICD-10-PCS | Mod: CPTII,S$GLB,, | Performed by: INTERNAL MEDICINE

## 2023-04-12 RX ORDER — HYDRALAZINE HYDROCHLORIDE 25 MG/1
25 TABLET, FILM COATED ORAL 3 TIMES DAILY
Qty: 180 TABLET | Refills: 1 | Status: SHIPPED | OUTPATIENT
Start: 2023-04-12 | End: 2023-04-27

## 2023-04-12 RX ORDER — POTASSIUM CHLORIDE 750 MG/1
10 TABLET, EXTENDED RELEASE ORAL DAILY
Qty: 90 TABLET | Refills: 1 | Status: SHIPPED | OUTPATIENT
Start: 2023-04-12

## 2023-04-12 RX ORDER — PRAVASTATIN SODIUM 40 MG/1
40 TABLET ORAL NIGHTLY
Qty: 90 TABLET | Refills: 1 | Status: SHIPPED | OUTPATIENT
Start: 2023-04-12 | End: 2023-10-25 | Stop reason: SDUPTHER

## 2023-04-13 NOTE — TELEPHONE ENCOUNTER
Attempted (attempt 1) to contact patient to ensure she received Sildenafil from [facility] and to determine if she would like continue filling with [facility] or [facility]. NA, unable to LVM.

## 2023-04-18 NOTE — TELEPHONE ENCOUNTER
Contacted patient regarding Sildenafil. She confirms she has 2 full bottles on hand due to [facility] sending her medication. She confirms it was Sildenafil and not another medication. She would like to continue filling Sildenafil with [facility]. Pending next refill out accordingly. She knows to call [facility] if refill is needed sooner.

## 2023-04-27 ENCOUNTER — OFFICE VISIT (OUTPATIENT)
Dept: PRIMARY CARE CLINIC | Facility: CLINIC | Age: 73
End: 2023-04-27
Payer: MEDICARE

## 2023-04-27 VITALS
HEIGHT: 66 IN | WEIGHT: 182.13 LBS | DIASTOLIC BLOOD PRESSURE: 72 MMHG | OXYGEN SATURATION: 96 % | BODY MASS INDEX: 29.27 KG/M2 | SYSTOLIC BLOOD PRESSURE: 130 MMHG | HEART RATE: 72 BPM

## 2023-04-27 DIAGNOSIS — E55.9 VITAMIN D DEFICIENCY DISEASE: ICD-10-CM

## 2023-04-27 DIAGNOSIS — R73.03 PRE-DIABETES: ICD-10-CM

## 2023-04-27 DIAGNOSIS — E83.42 HYPOMAGNESEMIA: ICD-10-CM

## 2023-04-27 DIAGNOSIS — Z12.39 ENCOUNTER FOR SCREENING FOR MALIGNANT NEOPLASM OF BREAST, UNSPECIFIED SCREENING MODALITY: Primary | ICD-10-CM

## 2023-04-27 DIAGNOSIS — Z12.31 ENCOUNTER FOR SCREENING MAMMOGRAM FOR MALIGNANT NEOPLASM OF BREAST: ICD-10-CM

## 2023-04-27 DIAGNOSIS — E55.9 VITAMIN D DEFICIENCY: ICD-10-CM

## 2023-04-27 LAB
ALBUMIN SERPL BCP-MCNC: 3.7 G/DL (ref 3.5–5.2)
ALP SERPL-CCNC: 109 U/L (ref 55–135)
ALT SERPL W/O P-5'-P-CCNC: 18 U/L (ref 10–44)
ANION GAP SERPL CALC-SCNC: 11 MMOL/L (ref 8–16)
AST SERPL-CCNC: 21 U/L (ref 10–40)
BILIRUB SERPL-MCNC: 0.5 MG/DL (ref 0.1–1)
BUN SERPL-MCNC: 10 MG/DL (ref 8–23)
CALCIUM SERPL-MCNC: 11.5 MG/DL (ref 8.7–10.5)
CHLORIDE SERPL-SCNC: 108 MMOL/L (ref 95–110)
CO2 SERPL-SCNC: 24 MMOL/L (ref 23–29)
CREAT SERPL-MCNC: 0.8 MG/DL (ref 0.5–1.4)
EST. GFR  (NO RACE VARIABLE): >60 ML/MIN/1.73 M^2
GLUCOSE SERPL-MCNC: 86 MG/DL (ref 70–110)
MAGNESIUM SERPL-MCNC: 1.7 MG/DL (ref 1.6–2.6)
POTASSIUM SERPL-SCNC: 4.2 MMOL/L (ref 3.5–5.1)
PROT SERPL-MCNC: 7.3 G/DL (ref 6–8.4)
SODIUM SERPL-SCNC: 143 MMOL/L (ref 136–145)

## 2023-04-27 PROCEDURE — 99214 PR OFFICE/OUTPT VISIT, EST, LEVL IV, 30-39 MIN: ICD-10-PCS | Mod: S$GLB,,, | Performed by: PHYSICIAN ASSISTANT

## 2023-04-27 PROCEDURE — 3078F DIAST BP <80 MM HG: CPT | Mod: CPTII,S$GLB,, | Performed by: PHYSICIAN ASSISTANT

## 2023-04-27 PROCEDURE — 1101F PT FALLS ASSESS-DOCD LE1/YR: CPT | Mod: CPTII,S$GLB,, | Performed by: PHYSICIAN ASSISTANT

## 2023-04-27 PROCEDURE — 1159F MED LIST DOCD IN RCRD: CPT | Mod: CPTII,S$GLB,, | Performed by: PHYSICIAN ASSISTANT

## 2023-04-27 PROCEDURE — 3008F PR BODY MASS INDEX (BMI) DOCUMENTED: ICD-10-PCS | Mod: CPTII,S$GLB,, | Performed by: PHYSICIAN ASSISTANT

## 2023-04-27 PROCEDURE — 3075F PR MOST RECENT SYSTOLIC BLOOD PRESS GE 130-139MM HG: ICD-10-PCS | Mod: CPTII,S$GLB,, | Performed by: PHYSICIAN ASSISTANT

## 2023-04-27 PROCEDURE — 1159F PR MEDICATION LIST DOCUMENTED IN MEDICAL RECORD: ICD-10-PCS | Mod: CPTII,S$GLB,, | Performed by: PHYSICIAN ASSISTANT

## 2023-04-27 PROCEDURE — 3288F PR FALLS RISK ASSESSMENT DOCUMENTED: ICD-10-PCS | Mod: CPTII,S$GLB,, | Performed by: PHYSICIAN ASSISTANT

## 2023-04-27 PROCEDURE — 1126F PR PAIN SEVERITY QUANTIFIED, NO PAIN PRESENT: ICD-10-PCS | Mod: CPTII,S$GLB,, | Performed by: PHYSICIAN ASSISTANT

## 2023-04-27 PROCEDURE — 1160F PR REVIEW ALL MEDS BY PRESCRIBER/CLIN PHARMACIST DOCUMENTED: ICD-10-PCS | Mod: CPTII,S$GLB,, | Performed by: PHYSICIAN ASSISTANT

## 2023-04-27 PROCEDURE — 3075F SYST BP GE 130 - 139MM HG: CPT | Mod: CPTII,S$GLB,, | Performed by: PHYSICIAN ASSISTANT

## 2023-04-27 PROCEDURE — 3078F PR MOST RECENT DIASTOLIC BLOOD PRESSURE < 80 MM HG: ICD-10-PCS | Mod: CPTII,S$GLB,, | Performed by: PHYSICIAN ASSISTANT

## 2023-04-27 PROCEDURE — 3288F FALL RISK ASSESSMENT DOCD: CPT | Mod: CPTII,S$GLB,, | Performed by: PHYSICIAN ASSISTANT

## 2023-04-27 PROCEDURE — 80053 COMPREHEN METABOLIC PANEL: CPT | Performed by: PHYSICIAN ASSISTANT

## 2023-04-27 PROCEDURE — 99214 OFFICE O/P EST MOD 30 MIN: CPT | Mod: S$GLB,,, | Performed by: PHYSICIAN ASSISTANT

## 2023-04-27 PROCEDURE — 1126F AMNT PAIN NOTED NONE PRSNT: CPT | Mod: CPTII,S$GLB,, | Performed by: PHYSICIAN ASSISTANT

## 2023-04-27 PROCEDURE — 1160F RVW MEDS BY RX/DR IN RCRD: CPT | Mod: CPTII,S$GLB,, | Performed by: PHYSICIAN ASSISTANT

## 2023-04-27 PROCEDURE — 83735 ASSAY OF MAGNESIUM: CPT | Performed by: PHYSICIAN ASSISTANT

## 2023-04-27 PROCEDURE — 82652 VIT D 1 25-DIHYDROXY: CPT | Performed by: PHYSICIAN ASSISTANT

## 2023-04-27 PROCEDURE — 1101F PR PT FALLS ASSESS DOC 0-1 FALLS W/OUT INJ PAST YR: ICD-10-PCS | Mod: CPTII,S$GLB,, | Performed by: PHYSICIAN ASSISTANT

## 2023-04-27 PROCEDURE — 3008F BODY MASS INDEX DOCD: CPT | Mod: CPTII,S$GLB,, | Performed by: PHYSICIAN ASSISTANT

## 2023-04-27 RX ORDER — ERGOCALCIFEROL 1.25 MG/1
50000 CAPSULE ORAL
Qty: 12 CAPSULE | Refills: 3 | Status: SHIPPED | OUTPATIENT
Start: 2023-04-27 | End: 2023-06-27 | Stop reason: SDUPTHER

## 2023-04-27 RX ORDER — MONTELUKAST SODIUM 10 MG/1
TABLET ORAL
COMMUNITY
Start: 2023-02-08 | End: 2023-05-01

## 2023-04-27 RX ORDER — TRIAMCINOLONE ACETONIDE 1 MG/G
CREAM TOPICAL 2 TIMES DAILY
Qty: 80 G | Refills: 3 | Status: SHIPPED | OUTPATIENT
Start: 2023-04-27

## 2023-04-27 NOTE — PROGRESS NOTES
Subjective     Patient ID: Aleida Bliss is a 73 y.o. female.    Chief Complaint: Medication Refill    Patient is a 72 yo female here for a check up:    Patient Active Problem List:     Mitral valve disorder     Chest pain with high risk of acute coronary syndrome     Bradycardia     Fatigue     Severe pulmonary hypertension     Severe mitral regurgitation     Elevated left ventricular end-diastolic pressure (LVEDP)     H/O: rheumatic fever     SOB (shortness of breath)     Overweight (BMI 25.0-29.9)     Primary hypertension     Hypercholesterolemia     Nonspecific abnormal electrocardiogram (ECG) (EKG)     Imbalance     Cervicalgia     Abnormal nuclear stress test     Precordial pain     Moderate to severe mitral regurgitation     Coronary artery disease of native artery of native heart with stable angina pectoris     Gastroesophageal reflux disease     Vitamin D deficiency disease     Seasonal allergies     Mitral regurgitation and aortic stenosis     Pulmonary hypertension     Carotid artery plaque, left     Hypomagnesemia     Mild carotid artery disease   Followed by Cardiology       Patient was placed on Hydralazine, but could not take it due to making her dizzy.     Past Medical History:  No date: Arthritis  No date: Coronary artery disease  No date: GERD (gastroesophageal reflux disease)  No date: Hypertension  No date: SOB (shortness of breath)      Rash  This is a new problem. The current episode started more than 1 month ago. The problem has been waxing and waning since onset. The affected locations include the left upper leg and right upper leg. The rash is characterized by redness and itchiness. It is unknown if there was an exposure to a precipitant. Pertinent negatives include no fatigue, fever or shortness of breath. Past treatments include nothing.   Review of Systems   Constitutional:  Negative for activity change, chills, fatigue and fever.   Respiratory:  Negative for chest tightness, shortness  of breath and wheezing.    Cardiovascular:  Negative for chest pain, palpitations and leg swelling.   Gastrointestinal:  Negative for abdominal pain.   Integumentary:  Positive for rash.        Objective     Physical Exam  Vitals reviewed.   Constitutional:       General: She is not in acute distress.     Appearance: Normal appearance. She is not ill-appearing, toxic-appearing or diaphoretic.   HENT:      Head: Normocephalic and atraumatic.   Eyes:      Conjunctiva/sclera: Conjunctivae normal.   Cardiovascular:      Rate and Rhythm: Normal rate and regular rhythm.      Pulses: Normal pulses.      Heart sounds: Normal heart sounds. No murmur heard.    No friction rub. No gallop.   Pulmonary:      Effort: Pulmonary effort is normal. No respiratory distress.      Breath sounds: Normal breath sounds. No stridor. No wheezing, rhonchi or rales.   Chest:      Chest wall: No tenderness.   Abdominal:      Palpations: Abdomen is soft.      Tenderness: There is no abdominal tenderness.   Skin:     Findings: Erythema and rash present. Rash is macular.          Neurological:      Mental Status: She is alert.     Lab Results   Component Value Date    WBC 6.10 09/03/2022    HGB 12.0 09/03/2022    HCT 38.0 09/03/2022    MCV 90 09/03/2022     09/03/2022     CMP  Sodium   Date Value Ref Range Status   04/06/2023 145 136 - 145 mmol/L Final     Potassium   Date Value Ref Range Status   04/06/2023 4.5 3.5 - 5.1 mmol/L Final     Chloride   Date Value Ref Range Status   04/06/2023 111 (H) 95 - 110 mmol/L Final     CO2   Date Value Ref Range Status   04/06/2023 26 23 - 29 mmol/L Final     Glucose   Date Value Ref Range Status   04/06/2023 99 70 - 110 mg/dL Final     BUN   Date Value Ref Range Status   04/06/2023 15 8 - 23 mg/dL Final     Creatinine   Date Value Ref Range Status   04/06/2023 1.1 0.5 - 1.4 mg/dL Final     Calcium   Date Value Ref Range Status   04/06/2023 10.8 (H) 8.7 - 10.5 mg/dL Final     Total Protein   Date Value  Ref Range Status   04/06/2023 6.6 6.0 - 8.4 g/dL Final     Albumin   Date Value Ref Range Status   04/06/2023 3.4 (L) 3.5 - 5.2 g/dL Final     Total Bilirubin   Date Value Ref Range Status   04/06/2023 0.6 0.1 - 1.0 mg/dL Final     Comment:     For infants and newborns, interpretation of results should be based  on gestational age, weight and in agreement with clinical  observations.    Premature Infant recommended reference ranges:  Up to 24 hours.............<8.0 mg/dL  Up to 48 hours............<12.0 mg/dL  3-5 days..................<15.0 mg/dL  6-29 days.................<15.0 mg/dL       Alkaline Phosphatase   Date Value Ref Range Status   04/06/2023 102 55 - 135 U/L Final     AST   Date Value Ref Range Status   04/06/2023 20 10 - 40 U/L Final     ALT   Date Value Ref Range Status   04/06/2023 14 10 - 44 U/L Final     Anion Gap   Date Value Ref Range Status   04/06/2023 8 8 - 16 mmol/L Final     eGFR if    Date Value Ref Range Status   02/15/2022 >60 >60 mL/min/1.73 m^2 Final     eGFR if non    Date Value Ref Range Status   02/15/2022 >60 >60 mL/min/1.73 m^2 Final     Comment:     Calculation used to obtain the estimated glomerular filtration  rate (eGFR) is the CKD-EPI equation.        Lab Results   Component Value Date    CHOL 170 08/12/2022     Lab Results   Component Value Date    HDL 52 08/12/2022     Lab Results   Component Value Date    LDLCALC 104.2 08/12/2022     Lab Results   Component Value Date    TRIG 69 08/12/2022     Lab Results   Component Value Date    CHOLHDL 30.6 08/12/2022     Lab Results   Component Value Date    HGBA1C 6.2 (H) 12/15/2021         Assessment and Plan     Problem List Items Addressed This Visit       Vitamin D deficiency disease    Relevant Medications    ergocalciferol (ERGOCALCIFEROL) 50,000 unit Cap    Hypomagnesemia    Relevant Orders    Comprehensive Metabolic Panel    Magnesium     Other Visit Diagnoses       Encounter for screening for  malignant neoplasm of breast, unspecified screening modality    -  Primary    Relevant Orders    Mammo Digital Screening Bilat w/ Carlos    Encounter for screening mammogram for malignant neoplasm of breast        Relevant Orders    Mammo Digital Screening Bilat w/ Carlos    Vitamin D deficiency        Relevant Orders    Calcitriol            Encounter for screening for malignant neoplasm of breast, unspecified screening modality  -     Mammo Digital Screening Bilat w/ Carlos; Future; Expected date: 04/27/2023    Hypomagnesemia  -     Comprehensive Metabolic Panel; Future; Expected date: 04/27/2023  -     Magnesium; Future; Expected date: 04/27/2023    Encounter for screening mammogram for malignant neoplasm of breast  -     Mammo Digital Screening Bilat w/ Carlos; Future; Expected date: 04/27/2023    Vitamin D deficiency disease  -     ergocalciferol (ERGOCALCIFEROL) 50,000 unit Cap; Take 1 capsule (50,000 Units total) by mouth every Monday.  Dispense: 12 capsule; Refill: 3    Vitamin D deficiency  -     Calcitriol; Future; Expected date: 04/27/2023    Pre-diabetes  -     Hemoglobin A1C; Future; Expected date: 04/28/2023    Other orders  -     triamcinolone acetonide 0.1% (KENALOG) 0.1 % cream; Apply topically 2 (two) times daily.  Dispense: 80 g; Refill: 3     I spent 30 minutes on this encounter, time includes face-to-face, chart review, documentation, test review and orders.

## 2023-04-28 ENCOUNTER — TELEPHONE (OUTPATIENT)
Dept: PRIMARY CARE CLINIC | Facility: CLINIC | Age: 73
End: 2023-04-28
Payer: MEDICARE

## 2023-04-28 DIAGNOSIS — E07.0 HYPERCALCITONEMIA: Primary | ICD-10-CM

## 2023-04-28 NOTE — TELEPHONE ENCOUNTER
----- Message from Sebastian Montiel III, PA-C sent at 4/28/2023  7:01 AM CDT -----  Aleida Bliss    I reviewed your lab work. It appears that some of the results are improved, but the calcium level is elevated. I recommend to get a PTH level.

## 2023-05-01 LAB — 1,25(OH)2D3 SERPL-MCNC: 124 PG/ML (ref 20–79)

## 2023-05-01 RX ORDER — MONTELUKAST SODIUM 10 MG/1
TABLET ORAL
Qty: 90 TABLET | Refills: 3 | Status: SHIPPED | OUTPATIENT
Start: 2023-05-01

## 2023-05-02 ENCOUNTER — LAB VISIT (OUTPATIENT)
Dept: PRIMARY CARE CLINIC | Facility: CLINIC | Age: 73
End: 2023-05-02
Payer: MEDICARE

## 2023-05-02 DIAGNOSIS — E07.0 HYPERCALCITONEMIA: ICD-10-CM

## 2023-05-02 LAB — PTH-INTACT SERPL-MCNC: 196 PG/ML (ref 9–77)

## 2023-05-02 PROCEDURE — 83970 ASSAY OF PARATHORMONE: CPT | Performed by: PHYSICIAN ASSISTANT

## 2023-05-04 ENCOUNTER — TELEPHONE (OUTPATIENT)
Dept: PRIMARY CARE CLINIC | Facility: CLINIC | Age: 73
End: 2023-05-04
Payer: MEDICARE

## 2023-05-04 DIAGNOSIS — E21.3 HYPERPARATHYROIDISM: Primary | ICD-10-CM

## 2023-05-04 NOTE — TELEPHONE ENCOUNTER
----- Message from Sebastian Montiel III, PA-C sent at 5/4/2023  6:56 AM CDT -----  Aleida Bliss    I reviewed your lab work. Your parathyroid hormone level is elevated. I recommend to to see Endocrinology.

## 2023-05-04 NOTE — TELEPHONE ENCOUNTER
Patient has referral for endocrinology due to eleveated pth. Please assist in scheduling. Thank you.

## 2023-05-08 NOTE — PROGRESS NOTES
"Endocrinology New Visit   05/09/2023    Subjective:      Chief Complaint:  Hypercalcemia      History of Present Illness  Aleida Bliss is a 73 y.o. female with HTN, HLD, prediabetes, mitral regurgitation, aortic stenosis, pulmonary hypertension, CAD, Vid D def, GERD referred by Sebastian Montiel III for evaluation of hypercalcemia.      With regards to the hypercalcemia:    Found to have elevated serum calcium dating back to at least 2018. Prior to 2018  Highest recorded albumin-corrected calcium value was 11.7 in 4/2023          PTH first noted to be elevated to 196 on 5/2/2023. No previous values.      Pertinent meds:    Calcium supplements: none   Diet: yogurt, vegetables, some cheese   Vitamin D supplements: Ergocalciferol 50,000 IU weekly managed by PCP    No   Yes  []    [x]  Thiazide and related diuretics -- on HCTZ 12.5mg daily  [x]    []  Lithium    Symptoms:    No   Yes  [x]    []  Depression/Anxiety  [x]    []  Mental Fog  []    [x]  Constipation -- occasionally, but relieved with miralax   [x]    []  Bone pain  [x]    []  >2" height loss      Indications for surgery:    No   Yes  []    [x]  Osteoporosis -- pt states she has osteoporosis and had DXA years ago - not in our system  [x]    []  Kidney stones or nephrocalcinosis  []    []  Elevated 24 hour urine calcium  [x]    []  GFR <60   [x]    []  Age <50    Pt's mother had kidney stones    DXA 12/2021 (in Care Everywhere)  DEXA BONE MINERAL DENSITOMETRY:     Indication: Z78.0:Asymptomatic menopausal state     Comparison: 5/1/2018     Technique/Findings:   Using the DEXA method, the average bone mineral density of lumbar spine was calculated and measures 1.028 g/cm^2.  This corresponds to a T-score of -1.3.  Previously mineralization in the lumbar spine measured 1.084 g/cm^2.     The bone mineral density of total mean femoral necks measures 0.819 g/cm^2 with a young adult T-score of -1.5. Previously mineralization in the proximal femora measured 0.879 " g/cm^2.     Ten-year major osteoporotic fracture (forearm, shoulder, hips, spine) risk is 10 %.  Ten-year hip fracture risk is 4 %. The 10 year probability of fracture may be lower than reported if the patient has received treatment.     IMPRESSION:   1. Average bone mineral density of the lumbar spine:  Osteopenia.   2. Bone mineral density of the proximal femora: Osteopenia.   3. Decrease in mineralization since 2018.         Pt reports osteoporosis dx years ago  Never on medication  No hx of fractures; occasionally falls - feels her balance is not great   Has done PT for balance in the past - helped some       Lab Results   Component Value Date    .0 (H) 05/02/2023    CALCIUM 11.5 (H) 04/27/2023    CALCIUM 10.8 (H) 04/06/2023    CALCIUM 10.1 09/03/2022    ALKPHOS 109 04/27/2023    ALKPHOS 102 04/06/2023    ALKPHOS 83 09/03/2022    TSH 0.799 08/12/2022         Thyroid nodule(s)  Found: 10/2022, incidentally disxovered during a workup for dysphagia.    Risk Factors for Thyroid Cancer:  Hx of External Beam Radiation: denies  Family Hx of Thyroid Cancer: denies    Denies rapid neck enlargement, difficulty swallowing, SOB, or hoarseness.     There is no known disorder of thyroid function.  Recent TSH:    Lab Results   Component Value Date    TSH 0.799 08/12/2022       Denies recent weight loss, fatigue, heat intolerance, hyperdefecation, tremor, palpitations, or changes in menses.     Denies weight gain, fatigue, cold intolerance, changes in skin or hair, or constipation.    Last US: 10/3/2022  FINDINGS:  The right lobe of thyroid gland measures 6.1 x 1.1 x 2.1 cm in size.  The left lobe the thyroid gland measures 5.1 x 1.6 x 2 pointcm in size.  This gives an approximate volume of on the right of 7.2cc and an approximate volume on the left of 10.6 cc for a total approximate volume of 17.8 cc.     Multiple thyroid nodules are noted the largest a 2.2 cm solid and cystic nodule within the left lobe the thyroid  "gland that is hypoechoic and predominantly solid.  Several punctate calcifications are difficult to exclude.  This nodule meets criteria for fine-needle aspiration or biopsy.  This is a TR 4 nodule for which fine-needle aspiration or biopsy is suggested     An elongated similar hypoechoic nodule is noted of 1.4 wider than tall without convincing calcification but predominantly solid.  Possible microcalcifications are noted this is a TR 5 nodule for which fine-needle aspiration is suggested     Impression:     1. Several thyroid nodules the largest on the left of 2.2 cm and the 2nd largest on the left of 1.4 cm meeting criteria for fine-needle aspiration or biopsy.      FNA of 2 left sided thyroid nodules in 11/2022 by ENT -- both benign      ROS:   As above    Objective:     /80   Pulse 70   Ht 5' 6" (1.676 m)   Wt 80.7 kg (178 lb 0.3 oz)   SpO2 98%   BMI 28.73 kg/m²   BP Readings from Last 3 Encounters:   05/09/23 120/80   04/27/23 130/72   04/12/23 134/68     Wt Readings from Last 1 Encounters:   05/09/23 1111 80.7 kg (178 lb 0.3 oz)     Body mass index is 28.73 kg/m².      Physical Exam  Vitals reviewed.   Constitutional:       General: She is not in acute distress.     Appearance: She is not ill-appearing.   HENT:      Head: Normocephalic.   Eyes:      Conjunctiva/sclera: Conjunctivae normal.   Neck:      Thyroid: No thyromegaly or thyroid tenderness.   Pulmonary:      Effort: Pulmonary effort is normal.   Musculoskeletal:         General: No swelling.      Cervical back: Normal range of motion and neck supple.   Lymphadenopathy:      Cervical: No cervical adenopathy.   Skin:     General: Skin is warm and dry.   Neurological:      Mental Status: She is alert and oriented to person, place, and time.   Psychiatric:         Mood and Affect: Mood normal.         Behavior: Behavior normal.     Lab Review:   Lab Results   Component Value Date    HGBA1C 6.2 (H) 12/15/2021     Lab Results   Component Value " Date    CHOL 170 08/12/2022    HDL 52 08/12/2022    LDLCALC 104.2 08/12/2022    TRIG 69 08/12/2022    CHOLHDL 30.6 08/12/2022     Lab Results   Component Value Date     04/27/2023    K 4.2 04/27/2023     04/27/2023    CO2 24 04/27/2023    GLU 86 04/27/2023    BUN 10 04/27/2023    CREATININE 0.8 04/27/2023    CALCIUM 11.5 (H) 04/27/2023    PROT 7.3 04/27/2023    ALBUMIN 3.7 04/27/2023    BILITOT 0.5 04/27/2023    ALKPHOS 109 04/27/2023    AST 21 04/27/2023    ALT 18 04/27/2023    ANIONGAP 11 04/27/2023    ESTGFRAFRICA >60 02/15/2022    EGFRNONAA >60 02/15/2022    TSH 0.799 08/12/2022     No results found for: [id number]    Assessment and Plan     Severe pulmonary hypertension  If parathyroidectomy is being considered in the future, would need clearance by cardiologist and pulmonologist given pHTN, CAD, valvular heart dz    Severe mitral regurgitation  If parathyroidectomy is being considered in the future, would need clearance by cardiologist and pulmonologist given pHTN, CAD, valvular heart dz    Vitamin D deficiency disease  Continue supplemental vit D  On ergocalciferol 50,000 IU weekly managed by PCP    Primary hypertension  Anti-HTN regimen currently includes atenolol and HCTZ 12.5mg   Need to discontinue HCTZ due to hypercalcemia   Her BP today is normal. She may need adjustment in regimen or atenolol dose after stopping HCTZ - I will send my note to her PCP to follow up pt's BP    Age-related osteoporosis without current pathological fracture  Pt reports being diagnosed with osteoporosis in the past  Review of DXA from 12/2021 in Care Everywhere confirms this -- dx based on low BMD/osteopenia range but high FRAX with hip fx risk >3%  States never on treatment for osteoporosis and no hx fractures, fall precautions discussed today. Also offered referral to outpatient PT for balance exercises but pt declines at this time.  Will get repeat DXA now of hip, spine, and will include forearm due to likely  hyperparathyroidism     Multiple thyroid nodules  Multiple thyroid nodules, She had benign FNA of the 2 largest nodules in the L lobe by ENT in 11/2022  Normal TSH in 8/2022 and no hx of thyroid d/o  No hyper- or hypothyroidism sx and no compressive sx. She does have some symptoms of chronic cough and occ SOB all attributable to cardiac and pulm disorders. Also notably has GERD as well.    Hypercalcemia  Hypercalcemia as far back as 2016, possibly longer   Most recent Ca 11.5 -- in regards to hypercalcemia she is asymptomatic   Elevated PTH x1  Although she is on HCTZ which can cause hypercalcemia, with her degree of hypercalcemia and high PTH we suspect underlying primary hyperparathyroidism. However, we will need to stop HCTZ and wait 4wks before repeating labs including a 24hr urine calcium  With osteoporosis dx and degree of Ca elevation on recent labs, she does meet surgical criteria if hyperpara dx is confirmed. Pt is amenable to surgery if absolutely necessary however she has many medical co-morbidities and would need preop clearance from pulm and cards most likely.     At this time we will stop HCTZ and get labs and urine Ca in 4wks and at that time will discuss next steps. In the meantime we discussed adequate hydration. Depending on plan for surgery may need US to eval for parathyroid adenoma -- also will be due for repeat thyroid US for nodules around 11/2023 - if plan for surgery for parathyroidectomy sooner, will get US sooner than 11/2023        RTC 6mo      Aleida Shaffer MD  [facility], 6th Floor  [address] [place], [address]    Office: [phone]  Fax: [phone]

## 2023-05-09 ENCOUNTER — OFFICE VISIT (OUTPATIENT)
Dept: ENDOCRINOLOGY | Facility: CLINIC | Age: 73
End: 2023-05-09
Payer: MEDICARE

## 2023-05-09 VITALS
HEART RATE: 70 BPM | WEIGHT: 178 LBS | DIASTOLIC BLOOD PRESSURE: 80 MMHG | OXYGEN SATURATION: 98 % | HEIGHT: 66 IN | BODY MASS INDEX: 28.61 KG/M2 | SYSTOLIC BLOOD PRESSURE: 120 MMHG

## 2023-05-09 DIAGNOSIS — I10 PRIMARY HYPERTENSION: ICD-10-CM

## 2023-05-09 DIAGNOSIS — I27.20 SEVERE PULMONARY HYPERTENSION: ICD-10-CM

## 2023-05-09 DIAGNOSIS — E04.2 MULTIPLE THYROID NODULES: ICD-10-CM

## 2023-05-09 DIAGNOSIS — E55.9 VITAMIN D DEFICIENCY DISEASE: ICD-10-CM

## 2023-05-09 DIAGNOSIS — I34.0 SEVERE MITRAL REGURGITATION: ICD-10-CM

## 2023-05-09 DIAGNOSIS — E83.52 HYPERCALCEMIA: Primary | ICD-10-CM

## 2023-05-09 DIAGNOSIS — E21.3 HYPERPARATHYROIDISM: ICD-10-CM

## 2023-05-09 DIAGNOSIS — M81.0 AGE-RELATED OSTEOPOROSIS WITHOUT CURRENT PATHOLOGICAL FRACTURE: ICD-10-CM

## 2023-05-09 PROCEDURE — 3074F PR MOST RECENT SYSTOLIC BLOOD PRESSURE < 130 MM HG: ICD-10-PCS | Mod: CPTII,GC,S$GLB, | Performed by: STUDENT IN AN ORGANIZED HEALTH CARE EDUCATION/TRAINING PROGRAM

## 2023-05-09 PROCEDURE — 1159F PR MEDICATION LIST DOCUMENTED IN MEDICAL RECORD: ICD-10-PCS | Mod: CPTII,GC,S$GLB, | Performed by: STUDENT IN AN ORGANIZED HEALTH CARE EDUCATION/TRAINING PROGRAM

## 2023-05-09 PROCEDURE — 1101F PT FALLS ASSESS-DOCD LE1/YR: CPT | Mod: CPTII,GC,S$GLB, | Performed by: STUDENT IN AN ORGANIZED HEALTH CARE EDUCATION/TRAINING PROGRAM

## 2023-05-09 PROCEDURE — 3079F PR MOST RECENT DIASTOLIC BLOOD PRESSURE 80-89 MM HG: ICD-10-PCS | Mod: CPTII,GC,S$GLB, | Performed by: STUDENT IN AN ORGANIZED HEALTH CARE EDUCATION/TRAINING PROGRAM

## 2023-05-09 PROCEDURE — 99999 PR PBB SHADOW E&M-EST. PATIENT-LVL V: ICD-10-PCS | Mod: PBBFAC,GC,, | Performed by: STUDENT IN AN ORGANIZED HEALTH CARE EDUCATION/TRAINING PROGRAM

## 2023-05-09 PROCEDURE — 3079F DIAST BP 80-89 MM HG: CPT | Mod: CPTII,GC,S$GLB, | Performed by: STUDENT IN AN ORGANIZED HEALTH CARE EDUCATION/TRAINING PROGRAM

## 2023-05-09 PROCEDURE — 99999 PR PBB SHADOW E&M-EST. PATIENT-LVL V: CPT | Mod: PBBFAC,GC,, | Performed by: STUDENT IN AN ORGANIZED HEALTH CARE EDUCATION/TRAINING PROGRAM

## 2023-05-09 PROCEDURE — 3008F PR BODY MASS INDEX (BMI) DOCUMENTED: ICD-10-PCS | Mod: CPTII,GC,S$GLB, | Performed by: STUDENT IN AN ORGANIZED HEALTH CARE EDUCATION/TRAINING PROGRAM

## 2023-05-09 PROCEDURE — 99204 OFFICE O/P NEW MOD 45 MIN: CPT | Mod: GC,S$GLB,, | Performed by: STUDENT IN AN ORGANIZED HEALTH CARE EDUCATION/TRAINING PROGRAM

## 2023-05-09 PROCEDURE — 1101F PR PT FALLS ASSESS DOC 0-1 FALLS W/OUT INJ PAST YR: ICD-10-PCS | Mod: CPTII,GC,S$GLB, | Performed by: STUDENT IN AN ORGANIZED HEALTH CARE EDUCATION/TRAINING PROGRAM

## 2023-05-09 PROCEDURE — 3288F PR FALLS RISK ASSESSMENT DOCUMENTED: ICD-10-PCS | Mod: CPTII,GC,S$GLB, | Performed by: STUDENT IN AN ORGANIZED HEALTH CARE EDUCATION/TRAINING PROGRAM

## 2023-05-09 PROCEDURE — 99204 PR OFFICE/OUTPT VISIT, NEW, LEVL IV, 45-59 MIN: ICD-10-PCS | Mod: GC,S$GLB,, | Performed by: STUDENT IN AN ORGANIZED HEALTH CARE EDUCATION/TRAINING PROGRAM

## 2023-05-09 PROCEDURE — 3288F FALL RISK ASSESSMENT DOCD: CPT | Mod: CPTII,GC,S$GLB, | Performed by: STUDENT IN AN ORGANIZED HEALTH CARE EDUCATION/TRAINING PROGRAM

## 2023-05-09 PROCEDURE — 1126F AMNT PAIN NOTED NONE PRSNT: CPT | Mod: CPTII,GC,S$GLB, | Performed by: STUDENT IN AN ORGANIZED HEALTH CARE EDUCATION/TRAINING PROGRAM

## 2023-05-09 PROCEDURE — 1126F PR PAIN SEVERITY QUANTIFIED, NO PAIN PRESENT: ICD-10-PCS | Mod: CPTII,GC,S$GLB, | Performed by: STUDENT IN AN ORGANIZED HEALTH CARE EDUCATION/TRAINING PROGRAM

## 2023-05-09 PROCEDURE — 1160F RVW MEDS BY RX/DR IN RCRD: CPT | Mod: CPTII,GC,S$GLB, | Performed by: STUDENT IN AN ORGANIZED HEALTH CARE EDUCATION/TRAINING PROGRAM

## 2023-05-09 PROCEDURE — 3008F BODY MASS INDEX DOCD: CPT | Mod: CPTII,GC,S$GLB, | Performed by: STUDENT IN AN ORGANIZED HEALTH CARE EDUCATION/TRAINING PROGRAM

## 2023-05-09 PROCEDURE — 3074F SYST BP LT 130 MM HG: CPT | Mod: CPTII,GC,S$GLB, | Performed by: STUDENT IN AN ORGANIZED HEALTH CARE EDUCATION/TRAINING PROGRAM

## 2023-05-09 PROCEDURE — 1159F MED LIST DOCD IN RCRD: CPT | Mod: CPTII,GC,S$GLB, | Performed by: STUDENT IN AN ORGANIZED HEALTH CARE EDUCATION/TRAINING PROGRAM

## 2023-05-09 PROCEDURE — 1160F PR REVIEW ALL MEDS BY PRESCRIBER/CLIN PHARMACIST DOCUMENTED: ICD-10-PCS | Mod: CPTII,GC,S$GLB, | Performed by: STUDENT IN AN ORGANIZED HEALTH CARE EDUCATION/TRAINING PROGRAM

## 2023-05-09 NOTE — Clinical Note
Hi, We saw Ms Bliss today in endocrine clinic for hypercalcemia. Since thiazides can increase serum Ca we told her to stop HCTZ. I asked her to f/u with you as she may need a diff med added or increase in atenolol, but will defer to you. Please avoid thiazides however. We are planning to repeat labs and a 24hr urine calcium 4wks after stopping HCTZ. See my note for details on the visit/plan. Do not hesitate to let me know if you have any questions! Aleida

## 2023-05-09 NOTE — ASSESSMENT & PLAN NOTE
Multiple thyroid nodules, She had benign FNA of the 2 largest nodules in the L lobe by ENT in 11/2022  Normal TSH in 8/2022 and no hx of thyroid d/o  No hyper- or hypothyroidism sx and no compressive sx. She does have some symptoms of chronic cough and occ SOB all attributable to cardiac and pulm disorders. Also notably has GERD as well.

## 2023-05-09 NOTE — PROGRESS NOTES
I have seen the patient, reviewed the Fellow's history and physical, assessment, plan, and progress note. I have personally interviewed and examined the patient at bedside and agree with the findings.     High suspicion for primary hyperparathyroidism. Will repeat labs 4 weeks after stopping HCTZ. She meets criteria for surgery, but would need cardiac clearance to proceed. Will update bone density and labs before deciding.    Lionel Desouza MD  Endocrinology Staff

## 2023-05-09 NOTE — ASSESSMENT & PLAN NOTE
Anti-HTN regimen currently includes atenolol and HCTZ 12.5mg   Need to discontinue HCTZ due to hypercalcemia   Her BP today is normal. She may need adjustment in regimen or atenolol dose after stopping HCTZ - I will send my note to her PCP to follow up pt's BP

## 2023-05-09 NOTE — PATIENT INSTRUCTIONS
Please STOP Hydrochlorothiazide (HCTZ) -- this is the water pill for your blood pressure. It can increase calcium in the blood. I will let your primary care provider know -- he will monitor your blood pressure and can adjust blood pressure meds if needed.    We would like to do labs and a 24 hour urine collection 1 month after stopping the HCTZ. Instructions are below for the urine collection. Please do the urine collection the day before your labs in 1 month!    We will also get a bone scan.      We will plan to repeat thyroid ultrasound around November 2023. We may do an ultrasound sooner based on the labs we are doing next month.       Follow up with me in 6 months in clinic.      HOW TO COLLECT AN ACCURATE   24 HOUR URINE SPECIMEN     - The first urine of the day upon waking when you start the 24 hour collection should be flushed down the toilet. Afterwards, collect EVERY drop of your urine for a 24 hour period.  The final total volume is not important as long as it consists of every drop that you produce during the 24 hour period   - After collection, bring the closed container back to the same lab

## 2023-05-09 NOTE — ASSESSMENT & PLAN NOTE
Pt reports being diagnosed with osteoporosis in the past  Review of DXA from 12/2021 in Care Everywhere confirms this -- dx based on low BMD/osteopenia range but high FRAX with hip fx risk >3%  States never on treatment for osteoporosis and no hx fractures, fall precautions discussed today. Also offered referral to outpatient PT for balance exercises but pt declines at this time.  Will get repeat DXA now of hip, spine, and will include forearm due to likely hyperparathyroidism

## 2023-05-09 NOTE — ASSESSMENT & PLAN NOTE
If parathyroidectomy is being considered in the future, would need clearance by cardiologist and pulmonologist given pHTN, CAD, valvular heart dz

## 2023-05-09 NOTE — ASSESSMENT & PLAN NOTE
Hypercalcemia as far back as 2016, possibly longer   Most recent Ca 11.5 -- in regards to hypercalcemia she is asymptomatic   Elevated PTH x1  Although she is on HCTZ which can cause hypercalcemia, with her degree of hypercalcemia and high PTH we suspect underlying primary hyperparathyroidism. However, we will need to stop HCTZ and wait 4wks before repeating labs including a 24hr urine calcium  With osteoporosis dx and degree of Ca elevation on recent labs, she does meet surgical criteria if hyperpara dx is confirmed. Pt is amenable to surgery if absolutely necessary however she has many medical co-morbidities and would need preop clearance from pulm and cards most likely.     At this time we will stop HCTZ and get labs and urine Ca in 4wks and at that time will discuss next steps. In the meantime we discussed adequate hydration. Depending on plan for surgery may need US to eval for parathyroid adenoma -- also will be due for repeat thyroid US for nodules around 11/2023 - if plan for surgery for parathyroidectomy sooner, will get US sooner than 11/2023

## 2023-05-17 ENCOUNTER — HOSPITAL ENCOUNTER (OUTPATIENT)
Dept: RADIOLOGY | Facility: HOSPITAL | Age: 73
Discharge: HOME | End: 2023-05-17
Attending: STUDENT IN AN ORGANIZED HEALTH CARE EDUCATION/TRAINING PROGRAM
Payer: MEDICARE

## 2023-05-17 DIAGNOSIS — E83.52 HYPERCALCEMIA: ICD-10-CM

## 2023-05-17 DIAGNOSIS — M81.0 AGE-RELATED OSTEOPOROSIS WITHOUT CURRENT PATHOLOGICAL FRACTURE: ICD-10-CM

## 2023-05-17 PROCEDURE — 77080 DXA BONE DENSITY AXIAL: CPT | Mod: TC,PO

## 2023-05-17 PROCEDURE — 77080 DXA BONE DENSITY AXIAL: CPT | Mod: 26,,, | Performed by: RADIOLOGY

## 2023-05-17 PROCEDURE — 77080 DXA BONE DENSITY AXIAL SKELETON 1 OR MORE SITES: ICD-10-PCS | Mod: 26,,, | Performed by: RADIOLOGY

## 2023-05-23 ENCOUNTER — TELEPHONE (OUTPATIENT)
Dept: ENDOCRINOLOGY | Facility: HOSPITAL | Age: 73
End: 2023-05-23
Payer: MEDICARE

## 2023-05-23 NOTE — TELEPHONE ENCOUNTER
Called pt to discuss recent labs and DXA. Unfortunately I was unable to reach Ms Bliss. Her voicemail was full and I was also unable to leave a message. I will attempt to contact her again later this week.    Aleida Shaffer MD  [facility], [address]th [address]  [address] [place], [address]    Office: [phone]  Fax: [phone]

## 2023-06-02 ENCOUNTER — TELEPHONE (OUTPATIENT)
Dept: ENDOCRINOLOGY | Facility: CLINIC | Age: 73
End: 2023-06-02

## 2023-06-02 DIAGNOSIS — I27.20 SEVERE PULMONARY HYPERTENSION: Primary | ICD-10-CM

## 2023-06-02 DIAGNOSIS — Z01.818 PREOP EXAMINATION: ICD-10-CM

## 2023-06-02 DIAGNOSIS — D49.7 PARATHYROID TUMOR: ICD-10-CM

## 2023-06-02 DIAGNOSIS — I25.118 CORONARY ARTERY DISEASE OF NATIVE ARTERY OF NATIVE HEART WITH STABLE ANGINA PECTORIS: ICD-10-CM

## 2023-06-02 DIAGNOSIS — I25.112: ICD-10-CM

## 2023-06-02 NOTE — TELEPHONE ENCOUNTER
Endocrinology recommends a possible surgery for a parathyroid tumor. Pending clearance.     Please schedule patient for a pre-op with Cardiology and Pulmonary. I will place lab, chest x-ray and EKG. Follow up with me after their evaluations for a preop.

## 2023-06-02 NOTE — TELEPHONE ENCOUNTER
Attempted to call pt regarding pulmonary referral no answer unable to lvm. Will attempt to call again later

## 2023-06-02 NOTE — TELEPHONE ENCOUNTER
"I called and spoke to Ms Bliss on the phone to discuss recent labs and DXA. DXA with low BMD but increase in FRAX compared to prior. However, FRAX went up this time because they included "parent fractured hip" in the calculation which was not included last time. That one approximately doubles the FRAX risk, explaining the difference between last DXA and now.     I discussed results with the pt and that we would recommend treating osteoporosis with Prolia if she does not wish to pursue parathyroid surgery. I discussed  the risks and benefits of prolia, as well as the frequency of doses, etc.     However, if she is interested in surgery for hyperparathyroidism, then we would not start Prolia now due to risk of postop hypocalcemia. Pt initially stated that if the surgery improved her cough or her generalized weakness she would want to have surgery. I explained multiple times that surgery will NOT improve her chronic cough and likely will not improve her weakness. I reviewed the reasons for surgery and possible outcomes multiple times. Pt states she is interested in surgery, if it is an option.     Due to her medical co-morbidities, including mitral regurgitation, aortic stenosis, pulmonary hypertension, CAD we would like her to be cleared for surgery prior to referring her to see our endocrine surgeon, Dr Cerna.    She is scheduled for echo next week (6/8/2023) but not scheduled to see her cardiologist, Dr Cuong Hinkle, until 10/25/2023. (She is insistent that she sees her cardiologist next week, however). She last saw pulmonologist Dr Loco in 11/2021, but has not followed up with pulmonology since then.     Sebastian Montiel, you are listed as her PCP. Can you please assist in a preop surgical evaluation for this pt and please let me know the results so I can assist in getting the pt scheduled to see Dr Cerna? Until she has been cleared medically I will wait to refer her to Dr Cerna. I will also cc this message to her " cardiologist Dr Hinkle and my endocrine attending, Dr Desouza.    Please let me know if you have any questions.    Thanks!  Aleida Shaffer MD  [facility], 6th Floor  [address] [place], [address]    Office: [phone]  Fax: [phone]

## 2023-06-08 ENCOUNTER — CLINICAL SUPPORT (OUTPATIENT)
Dept: CARDIOLOGY | Facility: CLINIC | Age: 73
End: 2023-06-08
Attending: INTERNAL MEDICINE
Payer: MEDICARE

## 2023-06-08 VITALS — HEART RATE: 67 BPM | WEIGHT: 178 LBS | BODY MASS INDEX: 28.61 KG/M2 | HEIGHT: 66 IN

## 2023-06-08 DIAGNOSIS — I27.20 PULMONARY HYPERTENSION: ICD-10-CM

## 2023-06-08 DIAGNOSIS — I08.0 MITRAL REGURGITATION AND AORTIC STENOSIS: ICD-10-CM

## 2023-06-08 LAB
ASCENDING AORTA: 2.48 CM
AV INDEX (PROSTH): 0.5
AV MEAN GRADIENT: 6 MMHG
AV PEAK GRADIENT: 10 MMHG
AV VALVE AREA: 1.59 CM2
AV VELOCITY RATIO: 0.57
BSA FOR ECHO PROCEDURE: 1.94 M2
CV ECHO LV RWT: 0.5 CM
DOP CALC AO PEAK VEL: 1.58 M/S
DOP CALC AO VTI: 34.9 CM
DOP CALC LVOT AREA: 3.2 CM2
DOP CALC LVOT DIAMETER: 2.02 CM
DOP CALC LVOT PEAK VEL: 0.9 M/S
DOP CALC LVOT STROKE VOLUME: 55.41 CM3
DOP CALCLVOT PEAK VEL VTI: 17.3 CM
E WAVE DECELERATION TIME: 286.36 MSEC
E/A RATIO: 1.13
E/E' RATIO: 41.5 M/S
ECHO LV POSTERIOR WALL: 1.03 CM (ref 0.6–1.1)
EJECTION FRACTION: 65 %
FRACTIONAL SHORTENING: 35 % (ref 28–44)
INTERVENTRICULAR SEPTUM: 1.07 CM (ref 0.6–1.1)
IVRT: 142.73 MSEC
LA MAJOR: 6.69 CM
LA MINOR: 6.51 CM
LA WIDTH: 4.7 CM
LEFT ATRIUM SIZE: 3.45 CM
LEFT ATRIUM VOLUME INDEX: 47.9 ML/M2
LEFT ATRIUM VOLUME: 90.95 CM3
LEFT INTERNAL DIMENSION IN SYSTOLE: 2.69 CM (ref 2.1–4)
LEFT VENTRICLE DIASTOLIC VOLUME INDEX: 39.48 ML/M2
LEFT VENTRICLE DIASTOLIC VOLUME: 75.01 ML
LEFT VENTRICLE MASS INDEX: 75 G/M2
LEFT VENTRICLE SYSTOLIC VOLUME INDEX: 14.1 ML/M2
LEFT VENTRICLE SYSTOLIC VOLUME: 26.73 ML
LEFT VENTRICULAR INTERNAL DIMENSION IN DIASTOLE: 4.12 CM (ref 3.5–6)
LEFT VENTRICULAR MASS: 142.63 G
LV LATERAL E/E' RATIO: 41.5 M/S
LV SEPTAL E/E' RATIO: 41.5 M/S
LVOT MG: 1.46 MMHG
LVOT MV: 0.55 CM/S
MV PEAK A VEL: 1.47 M/S
MV PEAK E VEL: 1.66 M/S
PISA MRMAX VEL: 6.55 M/S
PISA TR MAX VEL: 3.3 M/S
RA MAJOR: 5.18 CM
RA PRESSURE: 3 MMHG
RA WIDTH: 2.9 CM
RIGHT VENTRICULAR END-DIASTOLIC DIMENSION: 3.48 CM
RIGHT VENTRICULAR LENGTH IN DIASTOLE (APICAL 4-CHAMBER VIEW): 3.99 CM
RV MID DIAMA: 3.02 CM
RV TISSUE DOPPLER FREE WALL SYSTOLIC VELOCITY 1 (APICAL 4 CHAMBER VIEW): 0.01 CM/S
SINUS: 2.53 CM
STJ: 2.49 CM
TDI LATERAL: 0.04 M/S
TDI SEPTAL: 0.04 M/S
TDI: 0.04 M/S
TR MAX PG: 44 MMHG
TRICUSPID ANNULAR PLANE SYSTOLIC EXCURSION: 1.75 CM
TV REST PULMONARY ARTERY PRESSURE: 47 MMHG

## 2023-06-08 PROCEDURE — 93306 ECHO (CUPID ONLY): ICD-10-PCS | Mod: S$GLB,,, | Performed by: INTERNAL MEDICINE

## 2023-06-08 PROCEDURE — 93306 TTE W/DOPPLER COMPLETE: CPT | Mod: S$GLB,,, | Performed by: INTERNAL MEDICINE

## 2023-06-12 ENCOUNTER — SPECIALTY PHARMACY (OUTPATIENT)
Dept: PHARMACY | Facility: CLINIC | Age: 73
End: 2023-06-12
Payer: MEDICARE

## 2023-06-12 NOTE — TELEPHONE ENCOUNTER
Specialty Pharmacy - Refill Coordination    Specialty Medication Orders Linked to Encounter      Flowsheet Row Most Recent Value   Medication #1 sildenafil (REVATIO) 20 mg Tab (Order#[id number], Rx#[id number])            Refill Questions - Documented Responses      Flowsheet Row Most Recent Value   Patient Availability and HIPAA Verification    Does patient want to proceed with activity? Yes   HIPAA/medical authority confirmed? Yes   Relationship to patient of person spoken to? Self   Refill Screening Questions    Changes to allergies? No   Changes to medications? No   New conditions since last clinic visit? No   Unplanned office visit, urgent care, ED, or hospital admission in the last 4 weeks? No   How does patient/caregiver feel medication is working? Good   Financial problems or insurance changes? No   How many doses of your specialty medications were missed in the last 4 weeks? 0   Would patient like to speak to a pharmacist? No   When does the patient need to receive the medication? 06/21/23   Refill Delivery Questions    How will the patient receive the medication? [facility]   When does the patient need to receive the medication? 06/21/23   Shipping Address Home   Address in [facility] confirmed and updated if neccessary? Yes   Expected Copay ($) 0   Is the patient able to afford the medication copay? Yes   Payment Method zero copay   Days supply of Refill 30   Supplies needed? No supplies needed   Refill activity completed? Yes   Refill activity plan Refill scheduled   Shipment/Pickup Date: 06/14/23            Current Outpatient Medications   Medication Sig    albuterol (PROVENTIL/VENTOLIN HFA) 90 mcg/actuation inhaler Inhale 1-2 puffs into the lungs every 6 (six) hours as needed for Wheezing. Rescue    atenoloL (TENORMIN) 50 MG tablet Take 1 tablet (50 mg total) by mouth once daily.    cetirizine (ZYRTEC) 10 MG tablet Take 10 mg by mouth once daily.    ergocalciferol (ERGOCALCIFEROL) 50,000 unit Cap Take  1 capsule (50,000 Units total) by mouth every Monday.    fluticasone propionate (FLONASE) 50 mcg/actuation nasal spray 2 sprays (100 mcg total) by Each Nostril route once daily.    magnesium oxide (MAG-OX) 400 mg (241.3 mg magnesium) tablet Take 1 tablet (400 mg total) by mouth once daily.    montelukast (SINGULAIR) 10 mg tablet TAKE 1 TABLET EVERY EVENING    omeprazole (PRILOSEC) 40 MG capsule Take 1 capsule (40 mg total) by mouth once daily.    potassium chloride (KLOR-CON 10) 10 MEQ TbSR Take 1 tablet (10 mEq total) by mouth once daily.    pravastatin (PRAVACHOL) 40 MG tablet Take 1 tablet (40 mg total) by mouth every evening.    sildenafil (REVATIO) 20 mg Tab Take 1 tablet (20 mg total) by mouth 3 (three) times daily.    sucralfate (CARAFATE) 1 gram tablet Take 1 g by mouth every evening.    triamcinolone acetonide 0.1% (KENALOG) 0.1 % cream Apply topically 2 (two) times daily.    umeclidinium-vilanteroL (ANORO ELLIPTA) 62.5-25 mcg/actuation DsDv Inhale 1 puff into the lungs as needed.   Last reviewed on 5/9/2023  1:02 PM by Aleida Shaffer MD    Review of patient's allergies indicates:   Allergen Reactions    Thiazides Other (See Comments)     Elevates calcium    Lisinopril Other (See Comments)     Other reaction(s): Cough      Cyclobenzaprine Rash    Losartan Nausea Only     Other reaction(s): Unknown      Last reviewed on  5/9/2023 3:48 PM by Sebastian Montiel      Tasks added this encounter   No tasks added.   Tasks due within next 3 months   6/12/2023 - Refill Coordination Outreach (1 time occurrence)     Eduarda Carreno [facility]  [address] [place] [id number]  Phone: [phone]  Fax: [phone]

## 2023-06-21 ENCOUNTER — OFFICE VISIT (OUTPATIENT)
Dept: CARDIOLOGY | Facility: CLINIC | Age: 73
End: 2023-06-21
Payer: MEDICARE

## 2023-06-21 ENCOUNTER — CLINICAL SUPPORT (OUTPATIENT)
Dept: FAMILY MEDICINE | Facility: CLINIC | Age: 73
End: 2023-06-21
Payer: MEDICARE

## 2023-06-21 ENCOUNTER — HOSPITAL ENCOUNTER (OUTPATIENT)
Dept: RADIOLOGY | Facility: HOSPITAL | Age: 73
Discharge: HOME | End: 2023-06-21
Attending: PHYSICIAN ASSISTANT
Payer: MEDICARE

## 2023-06-21 VITALS
HEART RATE: 85 BPM | HEIGHT: 66 IN | SYSTOLIC BLOOD PRESSURE: 137 MMHG | BODY MASS INDEX: 28.63 KG/M2 | WEIGHT: 178.13 LBS | DIASTOLIC BLOOD PRESSURE: 67 MMHG

## 2023-06-21 DIAGNOSIS — Z01.818 PREOP EXAMINATION: ICD-10-CM

## 2023-06-21 DIAGNOSIS — I49.3 PVC (PREMATURE VENTRICULAR CONTRACTION): Chronic | ICD-10-CM

## 2023-06-21 DIAGNOSIS — I25.118 CORONARY ARTERY DISEASE OF NATIVE ARTERY OF NATIVE HEART WITH STABLE ANGINA PECTORIS: ICD-10-CM

## 2023-06-21 DIAGNOSIS — I27.20 SEVERE PULMONARY HYPERTENSION: ICD-10-CM

## 2023-06-21 DIAGNOSIS — I65.22 CAROTID ARTERY PLAQUE, LEFT: Chronic | ICD-10-CM

## 2023-06-21 DIAGNOSIS — I27.20 PULMONARY HYPERTENSION: Chronic | ICD-10-CM

## 2023-06-21 DIAGNOSIS — I25.118 CORONARY ARTERY DISEASE OF NATIVE ARTERY OF NATIVE HEART WITH STABLE ANGINA PECTORIS: Primary | Chronic | ICD-10-CM

## 2023-06-21 DIAGNOSIS — Z01.810 ENCOUNTER FOR PRE-OPERATIVE CARDIOVASCULAR CLEARANCE: ICD-10-CM

## 2023-06-21 DIAGNOSIS — I08.0 MITRAL REGURGITATION AND AORTIC STENOSIS: Chronic | ICD-10-CM

## 2023-06-21 PROCEDURE — 99213 OFFICE O/P EST LOW 20 MIN: CPT | Mod: 25,S$GLB,, | Performed by: INTERNAL MEDICINE

## 2023-06-21 PROCEDURE — 1159F PR MEDICATION LIST DOCUMENTED IN MEDICAL RECORD: ICD-10-PCS | Mod: CPTII,S$GLB,, | Performed by: INTERNAL MEDICINE

## 2023-06-21 PROCEDURE — 1101F PR PT FALLS ASSESS DOC 0-1 FALLS W/OUT INJ PAST YR: ICD-10-PCS | Mod: CPTII,S$GLB,, | Performed by: INTERNAL MEDICINE

## 2023-06-21 PROCEDURE — 3008F BODY MASS INDEX DOCD: CPT | Mod: CPTII,S$GLB,, | Performed by: INTERNAL MEDICINE

## 2023-06-21 PROCEDURE — 71046 X-RAY EXAM CHEST 2 VIEWS: CPT | Mod: TC,FY,PO

## 2023-06-21 PROCEDURE — 3075F PR MOST RECENT SYSTOLIC BLOOD PRESS GE 130-139MM HG: ICD-10-PCS | Mod: CPTII,S$GLB,, | Performed by: INTERNAL MEDICINE

## 2023-06-21 PROCEDURE — 3078F PR MOST RECENT DIASTOLIC BLOOD PRESSURE < 80 MM HG: ICD-10-PCS | Mod: CPTII,S$GLB,, | Performed by: INTERNAL MEDICINE

## 2023-06-21 PROCEDURE — 3008F PR BODY MASS INDEX (BMI) DOCUMENTED: ICD-10-PCS | Mod: CPTII,S$GLB,, | Performed by: INTERNAL MEDICINE

## 2023-06-21 PROCEDURE — 1126F PR PAIN SEVERITY QUANTIFIED, NO PAIN PRESENT: ICD-10-PCS | Mod: CPTII,S$GLB,, | Performed by: INTERNAL MEDICINE

## 2023-06-21 PROCEDURE — 71046 XR CHEST PA AND LATERAL: ICD-10-PCS | Mod: 26,,, | Performed by: RADIOLOGY

## 2023-06-21 PROCEDURE — 1101F PT FALLS ASSESS-DOCD LE1/YR: CPT | Mod: CPTII,S$GLB,, | Performed by: INTERNAL MEDICINE

## 2023-06-21 PROCEDURE — 3288F PR FALLS RISK ASSESSMENT DOCUMENTED: ICD-10-PCS | Mod: CPTII,S$GLB,, | Performed by: INTERNAL MEDICINE

## 2023-06-21 PROCEDURE — 93010 ELECTROCARDIOGRAM REPORT: CPT | Mod: S$GLB,,, | Performed by: INTERNAL MEDICINE

## 2023-06-21 PROCEDURE — 93005 EKG 12-LEAD: ICD-10-PCS | Mod: S$GLB,,, | Performed by: PHYSICIAN ASSISTANT

## 2023-06-21 PROCEDURE — 3288F FALL RISK ASSESSMENT DOCD: CPT | Mod: CPTII,S$GLB,, | Performed by: INTERNAL MEDICINE

## 2023-06-21 PROCEDURE — 1159F MED LIST DOCD IN RCRD: CPT | Mod: CPTII,S$GLB,, | Performed by: INTERNAL MEDICINE

## 2023-06-21 PROCEDURE — 93005 ELECTROCARDIOGRAM TRACING: CPT | Mod: S$GLB,,, | Performed by: PHYSICIAN ASSISTANT

## 2023-06-21 PROCEDURE — 93010 EKG 12-LEAD: ICD-10-PCS | Mod: S$GLB,,, | Performed by: INTERNAL MEDICINE

## 2023-06-21 PROCEDURE — 1126F AMNT PAIN NOTED NONE PRSNT: CPT | Mod: CPTII,S$GLB,, | Performed by: INTERNAL MEDICINE

## 2023-06-21 PROCEDURE — 99213 PR OFFICE/OUTPT VISIT, EST, LEVL III, 20-29 MIN: ICD-10-PCS | Mod: 25,S$GLB,, | Performed by: INTERNAL MEDICINE

## 2023-06-21 PROCEDURE — 71046 X-RAY EXAM CHEST 2 VIEWS: CPT | Mod: 26,,, | Performed by: RADIOLOGY

## 2023-06-21 PROCEDURE — 3075F SYST BP GE 130 - 139MM HG: CPT | Mod: CPTII,S$GLB,, | Performed by: INTERNAL MEDICINE

## 2023-06-21 PROCEDURE — 3078F DIAST BP <80 MM HG: CPT | Mod: CPTII,S$GLB,, | Performed by: INTERNAL MEDICINE

## 2023-06-21 NOTE — PROGRESS NOTES
Subjective:    Patient ID:  Aleida Bliss is a 73 y.o. female who presents for clearance and Mitral regurgitation and aortic stenosis        HPI  DISCUSSED TEST ECHO MODERATE TO SEVERE MR, MILD AORTIC STENOSIS, NORMAL LV FUNCTION, PA PRESSURE DOWN 47 MM OF MERCURY, LABS TODAY PREOP CLEARANCE FOR PARATHYROIDECTOMY, THE PATIENT DOES NOT KNOW WHAT IS GOING ON WITH HER , ASKING ME ABOUT BIOPSY RESULTS ETC. AND WONDERING IF I AM BE DOING HER SURGERY, HAS BEEN HAVING BACK PAIN ,THINKS I WILL BE DOING HER SURGERY,   SEE ROS    Past Medical History:   Diagnosis Date    Arthritis     Coronary artery disease     GERD (gastroesophageal reflux disease)     Hypertension     SOB (shortness of breath)      Past Surgical History:   Procedure Laterality Date    CATARACT EXTRACTION EXTRACAPSULAR W/ INTRAOCULAR LENS IMPLANTATION      CATHETERIZATION OF BOTH LEFT AND RIGHT HEART N/A 02/15/2022    Procedure: CATHETERIZATION, HEART, BOTH LEFT AND RIGHT;  Surgeon: Cuong Hinkle MD;  Location: [facility] CATH;  Service: Cardiology;  Laterality: N/A;    CHOLECYSTECTOMY      COLONOSCOPY      COLONOSCOPY N/A 3/15/2022    Procedure: COLONOSCOPY;  Surgeon: Varun Toro MD;  Location: [facility] ENDO;  Service: Endoscopy;  Laterality: N/A;    HYSTERECTOMY       Family History   Problem Relation Age of Onset    Heart disease Mother     Cancer Father         prostate     Social History     Socioeconomic History    Marital status:    Tobacco Use    Smoking status: Never    Smokeless tobacco: Never   Substance and Sexual Activity    Alcohol use: No    Drug use: No    Sexual activity: Not Currently       Review of patient's allergies indicates:   Allergen Reactions    Thiazides Other (See Comments)     Elevates calcium    Lisinopril Other (See Comments)     Other reaction(s): Cough      Cyclobenzaprine Rash    Losartan Nausea Only     Other reaction(s): Unknown         Current Outpatient Medications:     albuterol (PROVENTIL/VENTOLIN HFA) 90  mcg/actuation inhaler, Inhale 1-2 puffs into the lungs every 6 (six) hours as needed for Wheezing. Rescue, Disp: 8 g, Rfl: 0    atenoloL (TENORMIN) 50 MG tablet, Take 1 tablet (50 mg total) by mouth once daily., Disp: 90 tablet, Rfl: 3    cetirizine (ZYRTEC) 10 MG tablet, Take 10 mg by mouth once daily., Disp: , Rfl:     ergocalciferol (ERGOCALCIFEROL) 50,000 unit Cap, Take 1 capsule (50,000 Units total) by mouth every Monday., Disp: 12 capsule, Rfl: 3    fluticasone propionate (FLONASE) 50 mcg/actuation nasal spray, 2 sprays (100 mcg total) by Each Nostril route once daily., Disp: 3 g, Rfl: 3    magnesium oxide (MAG-OX) 400 mg (241.3 mg magnesium) tablet, Take 1 tablet (400 mg total) by mouth once daily., Disp: 90 tablet, Rfl: 3    montelukast (SINGULAIR) 10 mg tablet, TAKE 1 TABLET EVERY EVENING, Disp: 90 tablet, Rfl: 3    omeprazole (PRILOSEC) 40 MG capsule, Take 1 capsule (40 mg total) by mouth once daily., Disp: 90 capsule, Rfl: 3    potassium chloride (KLOR-CON 10) 10 MEQ TbSR, Take 1 tablet (10 mEq total) by mouth once daily., Disp: 90 tablet, Rfl: 1    pravastatin (PRAVACHOL) 40 MG tablet, Take 1 tablet (40 mg total) by mouth every evening., Disp: 90 tablet, Rfl: 1    sildenafil (REVATIO) 20 mg Tab, Take 1 tablet (20 mg total) by mouth 3 (three) times daily., Disp: 270 tablet, Rfl: 3    sucralfate (CARAFATE) 1 gram tablet, Take 1 g by mouth every evening., Disp: , Rfl:     triamcinolone acetonide 0.1% (KENALOG) 0.1 % cream, Apply topically 2 (two) times daily., Disp: 80 g, Rfl: 3    umeclidinium-vilanteroL (ANORO ELLIPTA) 62.5-25 mcg/actuation DsDv, Inhale 1 puff into the lungs as needed., Disp: , Rfl:     Review of Systems   Constitutional: Negative for chills, decreased appetite, diaphoresis, fever, malaise/fatigue and night sweats.   HENT:  Negative for congestion and nosebleeds.    Eyes:  Negative for blurred vision and visual disturbance.   Cardiovascular:  Positive for dyspnea on exertion (MILD).  "Negative for chest pain, claudication, cyanosis, irregular heartbeat, leg swelling, near-syncope, orthopnea, palpitations, paroxysmal nocturnal dyspnea and syncope.   Respiratory:  Negative for cough (DRY), hemoptysis, shortness of breath and wheezing.    Hematologic/Lymphatic: Negative for adenopathy. Does not bruise/bleed easily.   Skin:  Negative for color change and itching.   Musculoskeletal:  Positive for arthritis, back pain (TO RLE), joint pain and neck pain. Negative for falls.   Gastrointestinal:  Negative for abdominal pain, change in bowel habit, jaundice, melena and nausea.   Genitourinary:  Negative for dysuria and flank pain.   Neurological:  Negative for brief paralysis, focal weakness, headaches, light-headedness, loss of balance, numbness and weakness. Dizziness: OCC.  Psychiatric/Behavioral:  Negative for altered mental status and depression.       Objective:      Vitals:    06/21/23 1317   BP: 137/67   Pulse: 85   Weight: 80.8 kg (178 lb 2.1 oz)   Height: 5' 6" (1.676 m)   PainSc: 0-No pain     Body mass index is 28.75 kg/m².    Physical Exam  Constitutional:       Appearance: Normal appearance. She is overweight.   HENT:      Head: Normocephalic and atraumatic.   Eyes:      Extraocular Movements: Extraocular movements intact.      Pupils: Pupils are equal, round, and reactive to light.   Neck:      Thyroid: No thyromegaly.      Vascular: Normal carotid pulses. No carotid bruit, hepatojugular reflux or JVD.      Trachea: Trachea normal. No tracheal deviation.   Cardiovascular:      Rate and Rhythm: Normal rate and regular rhythm.      Pulses:           Carotid pulses are 2+ on the right side and 2+ on the left side.       Radial pulses are 2+ on the right side and 2+ on the left side.        Posterior tibial pulses are 2+ on the right side and 2+ on the left side.      Heart sounds: Murmur heard.   Systolic murmur is present with a grade of 2/6.     No friction rub. No gallop. No S4 sounds. "   Pulmonary:      Effort: Pulmonary effort is normal.      Breath sounds: Normal breath sounds and air entry. No rales.   Abdominal:      General: Bowel sounds are normal.      Palpations: Abdomen is soft.      Tenderness: There is no abdominal tenderness.   Musculoskeletal:         General: Normal range of motion.      Cervical back: Neck supple. No edema or erythema.      Right lower leg: No edema.      Left lower leg: No edema.   Skin:     General: Skin is warm and dry.      Capillary Refill: Capillary refill takes less than 2 seconds.   Neurological:      General: No focal deficit present.      Mental Status: She is alert and oriented to person, place, and time.      Cranial Nerves: Cranial nerves 2-12 are intact. No cranial nerve deficit.   Psychiatric:         Mood and Affect: Mood is anxious.         Speech: Speech normal.         Behavior: Behavior normal.               ..    Chemistry        Component Value Date/Time     05/17/2023 0918    K 4.1 05/17/2023 0918     05/17/2023 0918    CO2 27 05/17/2023 0918    BUN 12 05/17/2023 0918    CREATININE 1.0 05/17/2023 0918    GLU 92 05/17/2023 0918        Component Value Date/Time    CALCIUM 11.4 (H) 05/17/2023 0918    ALKPHOS 109 04/27/2023 1150    AST 21 04/27/2023 1150    ALT 18 04/27/2023 1150    BILITOT 0.5 04/27/2023 1150    ESTGFRAFRICA >60 02/15/2022 0818    EGFRNONAA >60 02/15/2022 0818            ..  Lab Results   Component Value Date    CHOL 170 08/12/2022    CHOL 166 12/15/2021    CHOL 127 09/22/2021     Lab Results   Component Value Date    HDL 52 08/12/2022    HDL 51 12/15/2021    HDL 45 09/22/2021     Lab Results   Component Value Date    LDLCALC 104.2 08/12/2022    LDLCALC 101 (H) 12/15/2021    LDLCALC 68 09/22/2021     Lab Results   Component Value Date    TRIG 69 08/12/2022    TRIG 76 12/15/2021    TRIG 65 09/22/2021     Lab Results   Component Value Date    CHOLHDL 30.6 08/12/2022     ..  Lab Results   Component Value Date    WBC 5.23  06/21/2023    HGB 12.2 06/21/2023    HCT 39.2 06/21/2023    MCV 93 06/21/2023     06/21/2023       Test(s) Reviewed  I have reviewed the following in detail:  [] Stress test   [] Angiography   [x] Echocardiogram   [x] Labs   [] Other:       Assessment:         ICD-10-CM ICD-9-CM   1. Coronary artery disease of native artery of native heart with stable angina pectoris  I25.118 414.01     413.9   2. Mitral regurgitation and aortic stenosis  I08.0 396.2   3. Encounter for pre-operative cardiovascular clearance  Z01.810 V72.81   4. Pulmonary hypertension  I27.20 416.8   5. Carotid artery plaque, left  I65.22 433.10   6. PVC (premature ventricular contraction)  I49.3 427.69     Problem List Items Addressed This Visit          Pulmonary    Pulmonary hypertension       Cardiac/Vascular    Coronary artery disease of native artery of native heart with stable angina pectoris - Primary    Mitral regurgitation and aortic stenosis    Carotid artery plaque, left    Encounter for pre-operative cardiovascular clearance    PVC (premature ventricular contraction)        Plan:     EKG SR WITH PVC' LVH, ACCEPTABLE CV RISK FOR SURGERY CLASS 1 ANGINA NO OVERT HEART FAILURE STABLE BENIGN ARRHYTHMIA DIET EXERCISE WEIGHT LOSS INCREASE ACTIVITY FOLLOW-UP WITH PCP REGARDING BACK PAIN ALL OTHER COMPLAINT, RETURN TO CLINIC IN 4-5 MONTHS,       Coronary artery disease of native artery of native heart with stable angina pectoris    Mitral regurgitation and aortic stenosis  Comments:  STABLE    Encounter for pre-operative cardiovascular clearance    Pulmonary hypertension  Comments:  IMPROVED WITH MEDS    Carotid artery plaque, left    PVC (premature ventricular contraction)    RTC Low level/low impact aerobic exercise 5x's/wk. Heart healthy diet and risk factor modification.    See labs and med orders.    Aerobic exercise 5x's/wk. Heart healthy diet and risk factor modification.    See labs and med orders.

## 2023-06-22 ENCOUNTER — TELEPHONE (OUTPATIENT)
Dept: PRIMARY CARE CLINIC | Facility: CLINIC | Age: 73
End: 2023-06-22
Payer: MEDICARE

## 2023-06-22 NOTE — TELEPHONE ENCOUNTER
----- Message from Sebastian Montiel III, PA-C sent at 6/22/2023  6:43 AM CDT -----  The chest x-ray shows a larger hiatal hernia, but other wise looks ok  
Handled in a different encounter  
15-Sep-2019 18:30
Him/He

## 2023-06-27 ENCOUNTER — OFFICE VISIT (OUTPATIENT)
Dept: PRIMARY CARE CLINIC | Facility: CLINIC | Age: 73
End: 2023-06-27
Payer: MEDICARE

## 2023-06-27 VITALS
WEIGHT: 174.19 LBS | HEART RATE: 77 BPM | BODY MASS INDEX: 28.11 KG/M2 | DIASTOLIC BLOOD PRESSURE: 74 MMHG | OXYGEN SATURATION: 95 % | SYSTOLIC BLOOD PRESSURE: 114 MMHG

## 2023-06-27 DIAGNOSIS — I25.118 CORONARY ARTERY DISEASE OF NATIVE ARTERY OF NATIVE HEART WITH STABLE ANGINA PECTORIS: ICD-10-CM

## 2023-06-27 DIAGNOSIS — E55.9 VITAMIN D DEFICIENCY DISEASE: ICD-10-CM

## 2023-06-27 DIAGNOSIS — I10 PRIMARY HYPERTENSION: ICD-10-CM

## 2023-06-27 DIAGNOSIS — Z01.818 PREOPERATIVE CLEARANCE: Primary | ICD-10-CM

## 2023-06-27 DIAGNOSIS — M54.31 SCIATICA OF RIGHT SIDE: ICD-10-CM

## 2023-06-27 PROCEDURE — 3288F PR FALLS RISK ASSESSMENT DOCUMENTED: ICD-10-PCS | Mod: CPTII,S$GLB,, | Performed by: PHYSICIAN ASSISTANT

## 2023-06-27 PROCEDURE — 3074F PR MOST RECENT SYSTOLIC BLOOD PRESSURE < 130 MM HG: ICD-10-PCS | Mod: CPTII,S$GLB,, | Performed by: PHYSICIAN ASSISTANT

## 2023-06-27 PROCEDURE — 3078F DIAST BP <80 MM HG: CPT | Mod: CPTII,S$GLB,, | Performed by: PHYSICIAN ASSISTANT

## 2023-06-27 PROCEDURE — 3288F FALL RISK ASSESSMENT DOCD: CPT | Mod: CPTII,S$GLB,, | Performed by: PHYSICIAN ASSISTANT

## 2023-06-27 PROCEDURE — 99214 OFFICE O/P EST MOD 30 MIN: CPT | Mod: S$GLB,,, | Performed by: PHYSICIAN ASSISTANT

## 2023-06-27 PROCEDURE — 3008F PR BODY MASS INDEX (BMI) DOCUMENTED: ICD-10-PCS | Mod: CPTII,S$GLB,, | Performed by: PHYSICIAN ASSISTANT

## 2023-06-27 PROCEDURE — 1159F MED LIST DOCD IN RCRD: CPT | Mod: CPTII,S$GLB,, | Performed by: PHYSICIAN ASSISTANT

## 2023-06-27 PROCEDURE — 1159F PR MEDICATION LIST DOCUMENTED IN MEDICAL RECORD: ICD-10-PCS | Mod: CPTII,S$GLB,, | Performed by: PHYSICIAN ASSISTANT

## 2023-06-27 PROCEDURE — 3008F BODY MASS INDEX DOCD: CPT | Mod: CPTII,S$GLB,, | Performed by: PHYSICIAN ASSISTANT

## 2023-06-27 PROCEDURE — 1101F PR PT FALLS ASSESS DOC 0-1 FALLS W/OUT INJ PAST YR: ICD-10-PCS | Mod: CPTII,S$GLB,, | Performed by: PHYSICIAN ASSISTANT

## 2023-06-27 PROCEDURE — 1125F PR PAIN SEVERITY QUANTIFIED, PAIN PRESENT: ICD-10-PCS | Mod: CPTII,S$GLB,, | Performed by: PHYSICIAN ASSISTANT

## 2023-06-27 PROCEDURE — 1125F AMNT PAIN NOTED PAIN PRSNT: CPT | Mod: CPTII,S$GLB,, | Performed by: PHYSICIAN ASSISTANT

## 2023-06-27 PROCEDURE — 3078F PR MOST RECENT DIASTOLIC BLOOD PRESSURE < 80 MM HG: ICD-10-PCS | Mod: CPTII,S$GLB,, | Performed by: PHYSICIAN ASSISTANT

## 2023-06-27 PROCEDURE — 3074F SYST BP LT 130 MM HG: CPT | Mod: CPTII,S$GLB,, | Performed by: PHYSICIAN ASSISTANT

## 2023-06-27 PROCEDURE — 1101F PT FALLS ASSESS-DOCD LE1/YR: CPT | Mod: CPTII,S$GLB,, | Performed by: PHYSICIAN ASSISTANT

## 2023-06-27 PROCEDURE — 99214 PR OFFICE/OUTPT VISIT, EST, LEVL IV, 30-39 MIN: ICD-10-PCS | Mod: S$GLB,,, | Performed by: PHYSICIAN ASSISTANT

## 2023-06-27 RX ORDER — HYDROCODONE BITARTRATE AND ACETAMINOPHEN 7.5; 325 MG/1; MG/1
1 TABLET ORAL EVERY 8 HOURS PRN
Qty: 20 TABLET | Refills: 0 | Status: SHIPPED | OUTPATIENT
Start: 2023-06-27

## 2023-06-27 RX ORDER — ERGOCALCIFEROL 1.25 MG/1
50000 CAPSULE ORAL
Qty: 12 CAPSULE | Refills: 3 | Status: SHIPPED | OUTPATIENT
Start: 2023-06-27

## 2023-06-27 RX ORDER — ATENOLOL 50 MG/1
50 TABLET ORAL DAILY
Qty: 90 TABLET | Refills: 3 | Status: SHIPPED | OUTPATIENT
Start: 2023-06-27 | End: 2023-07-20 | Stop reason: SDUPTHER

## 2023-06-27 RX ORDER — PREDNISONE 10 MG/1
TABLET ORAL
Qty: 18 TABLET | Refills: 0 | Status: SHIPPED | OUTPATIENT
Start: 2023-06-27 | End: 2023-11-13

## 2023-06-27 NOTE — MEDICAL/APP STUDENT
Subjective     Patient ID: Aleida Bliss is a 73 y.o. female.    Chief Complaint: Hip Pain (R sided hip pain)    Aleida Bliss is a 73 y.o. female seen in clinic today regarding right-sided back and hip pain. Patient with chronic lower back and right leg pain dating back to at least 2019. Today she is complaining of continued lower back pain L>R that radiates to right hip and right posterior leg. States pain is exacerbated with certain movements such as lifting her legs or trying to walk up stairs. Denies recent imaging or overuse. Denies true isolated hip pain. Prior MRI completed in 2019.     Patient with potential upcoming parathyroidectomy and needing surgical clearance given history of multiple co-morbidities including pulmonary hypertension, CAD, mitral regurgitation, and aortic stenosis. She has been cleared by cardiology with echo completed this month. She has pulmonary in the past, Dr. Alanis, but has not had proper follow up. Endocrine would ideally like to see her get pulmonary clearance prior to referring her to surgery. Reiterated this to patient.        Past Medical History:  No date: Arthritis  No date: Coronary artery disease  No date: GERD (gastroesophageal reflux disease)  No date: Hypertension  No date: SOB (shortness of breath)    Past Surgical History:  No date: CATARACT EXTRACTION EXTRACAPSULAR W/ INTRAOCULAR LENS   IMPLANTATION  02/15/2022: CATHETERIZATION OF BOTH LEFT AND RIGHT HEART; N/A      Comment:  Procedure: CATHETERIZATION, HEART, BOTH LEFT AND RIGHT;                Surgeon: Cuong Hinkle MD;  Location: [facility] CATH;                 Service: Cardiology;  Laterality: N/A;  No date: CHOLECYSTECTOMY  No date: COLONOSCOPY  3/15/2022: COLONOSCOPY; N/A      Comment:  Procedure: COLONOSCOPY;  Surgeon: Varun Toro MD;  Location: [facility] ENDO;  Service: Endoscopy;                 Laterality: N/A;  No date: HYSTERECTOMY    Review of patient's family history  indicates:      Social History    Socioeconomic History      Marital status:     Tobacco Use      Smoking status: Never      Smokeless tobacco: Never    Substance and Sexual Activity      Alcohol use: No      Drug use: No      Sexual activity: Not Currently      Review of patient's allergies indicates:   -- Thiazides -- Other (See Comments)    --  Elevates calcium   -- Lisinopril -- Other (See Comments)    --  Other reaction(s): Cough   -- Cyclobenzaprine -- Rash   -- Losartan -- Nausea Only    --  Other reaction(s): Unknown    Current Outpatient Medications: ·  albuterol (PROVENTIL/VENTOLIN HFA) 90 mcg/actuation inhaler, Inhale 1-2 puffs into the lungs every 6 (six) hours as needed for Wheezing. Rescue, Disp: 8 g, Rfl: 0·  atenoloL (TENORMIN) 50 MG tablet, Take 1 tablet (50 mg total) by mouth once daily., Disp: 90 tablet, Rfl: 3·  cetirizine (ZYRTEC) 10 MG tablet, Take 10 mg by mouth once daily., Disp: , Rfl: ·  ergocalciferol (ERGOCALCIFEROL) 50,000 unit Cap, Take 1 capsule (50,000 Units total) by mouth every Monday., Disp: 12 capsule, Rfl: 3·  fluticasone propionate (FLONASE) 50 mcg/actuation nasal spray, 2 sprays (100 mcg total) by Each Nostril route once daily., Disp: 3 g, Rfl: 3·  magnesium oxide (MAG-OX) 400 mg (241.3 mg magnesium) tablet, Take 1 tablet (400 mg total) by mouth once daily., Disp: 90 tablet, Rfl: 3·  montelukast (SINGULAIR) 10 mg tablet, TAKE 1 TABLET EVERY EVENING, Disp: 90 tablet, Rfl: 3·  omeprazole (PRILOSEC) 40 MG capsule, Take 1 capsule (40 mg total) by mouth once daily., Disp: 90 capsule, Rfl: 3·  potassium chloride (KLOR-CON 10) 10 MEQ TbSR, Take 1 tablet (10 mEq total) by mouth once daily., Disp: 90 tablet, Rfl: 1·  pravastatin (PRAVACHOL) 40 MG tablet, Take 1 tablet (40 mg total) by mouth every evening., Disp: 90 tablet, Rfl: 1·  sildenafil (REVATIO) 20 mg Tab, Take 1 tablet (20 mg total) by mouth 3 (three) times daily., Disp: 270 tablet, Rfl: 3·  sucralfate (CARAFATE) 1 gram  tablet, Take 1 g by mouth every evening., Disp: , Rfl: ·  triamcinolone acetonide 0.1% (KENALOG) 0.1 % cream, Apply topically 2 (two) times daily., Disp: 80 g, Rfl: 3·  umeclidinium-vilanteroL (ANORO ELLIPTA) 62.5-25 mcg/actuation DsDv, Inhale 1 puff into the lungs as needed., Disp: , Rfl:     /74   Pulse 77   Wt 79 kg (174 lb 2.6 oz)   SpO2 95%   BMI 28.11 kg/m²           Hip Pain     Review of Systems   Constitutional: Negative.    HENT: Negative.     Eyes: Negative.    Respiratory:  Negative for cough, chest tightness, shortness of breath and wheezing.    Gastrointestinal:  Negative for abdominal pain, nausea and vomiting.   Genitourinary: Negative.    Musculoskeletal:  Positive for back pain.        Objective     Physical Exam  Constitutional:       General: She is not in acute distress.     Appearance: She is not ill-appearing, toxic-appearing or diaphoretic.   HENT:      Head: Normocephalic and atraumatic.      Nose: Nose normal.      Mouth/Throat:      Mouth: Mucous membranes are moist.   Eyes:      Extraocular Movements: Extraocular movements intact.      Conjunctiva/sclera: Conjunctivae normal.      Pupils: Pupils are equal, round, and reactive to light.   Cardiovascular:      Rate and Rhythm: Normal rate.      Pulses: Normal pulses.      Heart sounds: Murmur heard.     No friction rub. No gallop.   Pulmonary:      Effort: Pulmonary effort is normal.      Breath sounds: Normal breath sounds.   Musculoskeletal:         General: Tenderness (left lower back) present.      Cervical back: Normal range of motion and neck supple.   Lymphadenopathy:      Cervical: No cervical adenopathy.   Skin:     General: Skin is warm and dry.      Capillary Refill: Capillary refill takes 2 to 3 seconds.   Neurological:      Mental Status: She is oriented to person, place, and time. Mental status is at baseline.   Psychiatric:         Mood and Affect: Mood normal.         Behavior: Behavior normal.          Assessment  and Plan     1. Preoperative clearance    2. Primary hypertension    3. Coronary artery disease of native artery of native heart with stable angina pectoris    4. Vitamin D deficiency disease    5. Sciatica of right side        Preoperative clearance  Per endocrine, patient to obtain cardiac and pulmonary clearance prior to being referred to endocrine surgeon for potential parathyroidectomy. Cleared for surgery from primary care standpoint. Awaiting pulmonary clearance. Patient understands and will schedule follow up with Dr. Alanis.   -     Ambulatory referral/consult to Pulmonology; Future; Expected date: 07/04/2023    Primary hypertension  Discontinue HCTZ per endocrine recommendation. BP is very well controlled.   -     atenoloL (TENORMIN) 50 MG tablet; Take 1 tablet (50 mg total) by mouth once daily.  Dispense: 90 tablet; Refill: 3  -     Ambulatory referral/consult to Pulmonology; Future; Expected date: 07/04/2023    Coronary artery disease of native artery of native heart with stable angina pectoris  -     atenoloL (TENORMIN) 50 MG tablet; Take 1 tablet (50 mg total) by mouth once daily.  Dispense: 90 tablet; Refill: 3    Vitamin D deficiency disease  -     ergocalciferol (ERGOCALCIFEROL) 50,000 unit Cap; Take 1 capsule (50,000 Units total) by mouth every Monday.  Dispense: 12 capsule; Refill: 3    Sciatica of right side  -     Ambulatory referral/consult to [facility]; Future; Expected date: 07/04/2023  -     predniSONE (DELTASONE) 10 MG tablet; Take 3 daily for 3 days, then 2 daily for three days, then 1 daily for three days.  Dispense: 18 tablet; Refill: 0  -     HYDROcodone-acetaminophen (NORCO) 7.5-325 mg per tablet; Take 1 tablet by mouth every 8 (eight) hours as needed for Pain.  Dispense: 20 tablet; Refill: 0  -     X-Ray Lumbar Spine 5 View; Future; Expected date: 06/27/2023               No follow-ups on file.

## 2023-06-27 NOTE — PROGRESS NOTES
Patient ID: Aleida Bliss is a 73 y.o. female.     Chief Complaint: Hip Pain (R sided hip pain)     Aleida Bliss is a 73 y.o. female seen in clinic today regarding right-sided back and hip pain. Patient with chronic lower back and right leg pain dating back to at least 2019. Today she is complaining of continued lower back pain L>R that radiates to right hip and right posterior leg. States pain is exacerbated with certain movements such as lifting her legs or trying to walk up stairs. Denies recent imaging or overuse. Denies true isolated hip pain. Prior MRI completed in 2019.      Patient with potential upcoming parathyroidectomy and needing surgical clearance given history of multiple co-morbidities including pulmonary hypertension, CAD, mitral regurgitation, and aortic stenosis. She has been cleared by cardiology with echo completed this month. She has pulmonary in the past, Dr. Alanis, but has not had proper follow up. Endocrine would ideally like to see her get pulmonary clearance prior to referring her to surgery. Reiterated this to patient.          Past Medical History:  No date: Arthritis  No date: Coronary artery disease  No date: GERD (gastroesophageal reflux disease)  No date: Hypertension  No date: SOB (shortness of breath)     Past Surgical History:  No date: CATARACT EXTRACTION EXTRACAPSULAR W/ INTRAOCULAR LENS   IMPLANTATION  02/15/2022: CATHETERIZATION OF BOTH LEFT AND RIGHT HEART; N/A      Comment:  Procedure: CATHETERIZATION, HEART, BOTH LEFT AND RIGHT;                Surgeon: Cuong Hinkle MD;  Location: [facility] CATH;                 Service: Cardiology;  Laterality: N/A;  No date: CHOLECYSTECTOMY  No date: COLONOSCOPY  3/15/2022: COLONOSCOPY; N/A      Comment:  Procedure: COLONOSCOPY;  Surgeon: Varun Toro MD;  Location: [facility] ENDO;  Service: Endoscopy;                 Laterality: N/A;  No date: HYSTERECTOMY     Review of patient's family history indicates:        Social  History    Socioeconomic History      Marital status:     Tobacco Use      Smoking status: Never      Smokeless tobacco: Never    Substance and Sexual Activity      Alcohol use: No      Drug use: No      Sexual activity: Not Currently        Review of patient's allergies indicates:   -- Thiazides -- Other (See Comments)    --  Elevates calcium   -- Lisinopril -- Other (See Comments)    --  Other reaction(s): Cough   -- Cyclobenzaprine -- Rash   -- Losartan -- Nausea Only    --  Other reaction(s): Unknown     Current Outpatient Medications: ·  albuterol (PROVENTIL/VENTOLIN HFA) 90 mcg/actuation inhaler, Inhale 1-2 puffs into the lungs every 6 (six) hours as needed for Wheezing. Rescue, Disp: 8 g, Rfl: 0·  atenoloL (TENORMIN) 50 MG tablet, Take 1 tablet (50 mg total) by mouth once daily., Disp: 90 tablet, Rfl: 3·  cetirizine (ZYRTEC) 10 MG tablet, Take 10 mg by mouth once daily., Disp: , Rfl: ·  ergocalciferol (ERGOCALCIFEROL) 50,000 unit Cap, Take 1 capsule (50,000 Units total) by mouth every Monday., Disp: 12 capsule, Rfl: 3·  fluticasone propionate (FLONASE) 50 mcg/actuation nasal spray, 2 sprays (100 mcg total) by Each Nostril route once daily., Disp: 3 g, Rfl: 3·  magnesium oxide (MAG-OX) 400 mg (241.3 mg magnesium) tablet, Take 1 tablet (400 mg total) by mouth once daily., Disp: 90 tablet, Rfl: 3·  montelukast (SINGULAIR) 10 mg tablet, TAKE 1 TABLET EVERY EVENING, Disp: 90 tablet, Rfl: 3·  omeprazole (PRILOSEC) 40 MG capsule, Take 1 capsule (40 mg total) by mouth once daily., Disp: 90 capsule, Rfl: 3·  potassium chloride (KLOR-CON 10) 10 MEQ TbSR, Take 1 tablet (10 mEq total) by mouth once daily., Disp: 90 tablet, Rfl: 1·  pravastatin (PRAVACHOL) 40 MG tablet, Take 1 tablet (40 mg total) by mouth every evening., Disp: 90 tablet, Rfl: 1·  sildenafil (REVATIO) 20 mg Tab, Take 1 tablet (20 mg total) by mouth 3 (three) times daily., Disp: 270 tablet, Rfl: 3·  sucralfate (CARAFATE) 1 gram tablet, Take 1 g by  mouth every evening., Disp: , Rfl: ·  triamcinolone acetonide 0.1% (KENALOG) 0.1 % cream, Apply topically 2 (two) times daily., Disp: 80 g, Rfl: 3·  umeclidinium-vilanteroL (ANORO ELLIPTA) 62.5-25 mcg/actuation DsDv, Inhale 1 puff into the lungs as needed., Disp: , Rfl:      /74   Pulse 77   Wt 79 kg (174 lb 2.6 oz)   SpO2 95%   BMI 28.11 kg/m²               Hip Pain      Review of Systems   Constitutional: Negative.    HENT: Negative.     Eyes: Negative.    Respiratory:  Negative for cough, chest tightness, shortness of breath and wheezing.    Gastrointestinal:  Negative for abdominal pain, nausea and vomiting.   Genitourinary: Negative.    Musculoskeletal:  Positive for back pain.      Objective      Physical Exam  Constitutional:       General: She is not in acute distress.     Appearance: She is not ill-appearing, toxic-appearing or diaphoretic.   HENT:      Head: Normocephalic and atraumatic.      Nose: Nose normal.      Mouth/Throat:      Mouth: Mucous membranes are moist.   Eyes:      Extraocular Movements: Extraocular movements intact.      Conjunctiva/sclera: Conjunctivae normal.      Pupils: Pupils are equal, round, and reactive to light.   Cardiovascular:      Rate and Rhythm: Normal rate.      Pulses: Normal pulses.      Heart sounds: Murmur heard.     No friction rub. No gallop.   Pulmonary:      Effort: Pulmonary effort is normal.      Breath sounds: Normal breath sounds.   Musculoskeletal:         General: Tenderness (left lower back) present.      Cervical back: Normal range of motion and neck supple.   Lymphadenopathy:      Cervical: No cervical adenopathy.   Skin:     General: Skin is warm and dry.      Capillary Refill: Capillary refill takes 2 to 3 seconds.   Neurological:      Mental Status: She is oriented to person, place, and time. Mental status is at baseline.   Psychiatric:         Mood and Affect: Mood normal.         Behavior: Behavior normal.         Lab Results   Component  Value Date    WBC 5.23 06/21/2023    HGB 12.2 06/21/2023    HCT 39.2 06/21/2023    MCV 93 06/21/2023     06/21/2023     CMP  Sodium   Date Value Ref Range Status   06/21/2023 142 136 - 145 mmol/L Final     Potassium   Date Value Ref Range Status   06/21/2023 3.8 3.5 - 5.1 mmol/L Final     Chloride   Date Value Ref Range Status   06/21/2023 108 95 - 110 mmol/L Final     CO2   Date Value Ref Range Status   06/21/2023 25 23 - 29 mmol/L Final     Glucose   Date Value Ref Range Status   06/21/2023 102 70 - 110 mg/dL Final     BUN   Date Value Ref Range Status   06/21/2023 9 8 - 23 mg/dL Final     Creatinine   Date Value Ref Range Status   06/21/2023 0.9 0.5 - 1.4 mg/dL Final     Calcium   Date Value Ref Range Status   06/21/2023 10.4 8.7 - 10.5 mg/dL Final     Total Protein   Date Value Ref Range Status   06/21/2023 6.9 6.0 - 8.4 g/dL Final     Albumin   Date Value Ref Range Status   06/21/2023 3.2 (L) 3.5 - 5.2 g/dL Final     Total Bilirubin   Date Value Ref Range Status   06/21/2023 0.6 0.1 - 1.0 mg/dL Final     Comment:     For infants and newborns, interpretation of results should be based  on gestational age, weight and in agreement with clinical  observations.    Premature Infant recommended reference ranges:  Up to 24 hours.............<8.0 mg/dL  Up to 48 hours............<12.0 mg/dL  3-5 days..................<15.0 mg/dL  6-29 days.................<15.0 mg/dL       Alkaline Phosphatase   Date Value Ref Range Status   06/21/2023 89 55 - 135 U/L Final     AST   Date Value Ref Range Status   06/21/2023 29 10 - 40 U/L Final     ALT   Date Value Ref Range Status   06/21/2023 20 10 - 44 U/L Final     Anion Gap   Date Value Ref Range Status   06/21/2023 9 8 - 16 mmol/L Final     eGFR if    Date Value Ref Range Status   02/15/2022 >60 >60 mL/min/1.73 m^2 Final     eGFR if non    Date Value Ref Range Status   02/15/2022 >60 >60 mL/min/1.73 m^2 Final     Comment:     Calculation used  to obtain the estimated glomerular filtration  rate (eGFR) is the CKD-EPI equation.        Lab Results   Component Value Date    CHOL 170 08/12/2022     Lab Results   Component Value Date    HDL 52 08/12/2022     Lab Results   Component Value Date    LDLCALC 104.2 08/12/2022     Lab Results   Component Value Date    TRIG 69 08/12/2022     Lab Results   Component Value Date    CHOLHDL 30.6 08/12/2022     Lab Results   Component Value Date    HGBA1C 6.2 (H) 12/15/2021               Assessment and Plan      1. Preoperative clearance     2. Primary hypertension     3. Coronary artery disease of native artery of native heart with stable angina pectoris     4. Vitamin D deficiency disease     5. Sciatica of right side           Preoperative clearance  Per endocrine, patient to obtain cardiac and pulmonary clearance prior to being referred to endocrine surgeon for potential parathyroidectomy. Cleared for surgery from primary care standpoint. Awaiting pulmonary clearance. Patient understands and will schedule follow up with Dr. Alanis.   -     Ambulatory referral/consult to Pulmonology; Future; Expected date: 07/04/2023     Primary hypertension  Discontinue HCTZ per endocrine recommendation. BP is very well controlled.   -     atenoloL (TENORMIN) 50 MG tablet; Take 1 tablet (50 mg total) by mouth once daily.  Dispense: 90 tablet; Refill: 3  -     Ambulatory referral/consult to Pulmonology; Future; Expected date: 07/04/2023     Coronary artery disease of native artery of native heart with stable angina pectoris  -     atenoloL (TENORMIN) 50 MG tablet; Take 1 tablet (50 mg total) by mouth once daily.  Dispense: 90 tablet; Refill: 3     Vitamin D deficiency disease  -     ergocalciferol (ERGOCALCIFEROL) 50,000 unit Cap; Take 1 capsule (50,000 Units total) by mouth every Monday.  Dispense: 12 capsule; Refill: 3     Sciatica of right side  -     Ambulatory referral/consult to [facility]; Future; Expected date:  07/04/2023  -     predniSONE (DELTASONE) 10 MG tablet; Take 3 daily for 3 days, then 2 daily for three days, then 1 daily for three days.  Dispense: 18 tablet; Refill: 0  -     HYDROcodone-acetaminophen (NORCO) 7.5-325 mg per tablet; Take 1 tablet by mouth every 8 (eight) hours as needed for Pain.  Dispense: 20 tablet; Refill: 0  -     X-Ray Lumbar Spine 5 View; Future; Expected date: 06/27/2023               I spent 30 minutes on this encounter, time includes face-to-face, chart review, documentation, test review and orders.

## 2023-07-03 ENCOUNTER — HOSPITAL ENCOUNTER (OUTPATIENT)
Dept: RADIOLOGY | Facility: HOSPITAL | Age: 73
Discharge: HOME | End: 2023-07-03
Attending: PHYSICIAN ASSISTANT
Payer: MEDICARE

## 2023-07-03 ENCOUNTER — OFFICE VISIT (OUTPATIENT)
Dept: PAIN MEDICINE | Facility: CLINIC | Age: 73
End: 2023-07-03
Payer: MEDICARE

## 2023-07-03 VITALS
WEIGHT: 178 LBS | HEIGHT: 66 IN | BODY MASS INDEX: 28.61 KG/M2 | HEART RATE: 68 BPM | DIASTOLIC BLOOD PRESSURE: 99 MMHG | SYSTOLIC BLOOD PRESSURE: 180 MMHG

## 2023-07-03 DIAGNOSIS — M54.31 SCIATICA OF RIGHT SIDE: ICD-10-CM

## 2023-07-03 DIAGNOSIS — M54.16 LUMBAR RADICULOPATHY, CHRONIC: ICD-10-CM

## 2023-07-03 DIAGNOSIS — M47.816 LUMBAR SPONDYLOSIS: Primary | ICD-10-CM

## 2023-07-03 DIAGNOSIS — M54.9 DORSALGIA, UNSPECIFIED: ICD-10-CM

## 2023-07-03 PROCEDURE — 1101F PR PT FALLS ASSESS DOC 0-1 FALLS W/OUT INJ PAST YR: ICD-10-PCS | Mod: CPTII,S$GLB,, | Performed by: PHYSICIAN ASSISTANT

## 2023-07-03 PROCEDURE — 1160F PR REVIEW ALL MEDS BY PRESCRIBER/CLIN PHARMACIST DOCUMENTED: ICD-10-PCS | Mod: CPTII,S$GLB,, | Performed by: PHYSICIAN ASSISTANT

## 2023-07-03 PROCEDURE — 1159F PR MEDICATION LIST DOCUMENTED IN MEDICAL RECORD: ICD-10-PCS | Mod: CPTII,S$GLB,, | Performed by: PHYSICIAN ASSISTANT

## 2023-07-03 PROCEDURE — 1159F MED LIST DOCD IN RCRD: CPT | Mod: CPTII,S$GLB,, | Performed by: PHYSICIAN ASSISTANT

## 2023-07-03 PROCEDURE — 99999 PR PBB SHADOW E&M-EST. PATIENT-LVL V: CPT | Mod: PBBFAC,,, | Performed by: PHYSICIAN ASSISTANT

## 2023-07-03 PROCEDURE — 3008F PR BODY MASS INDEX (BMI) DOCUMENTED: ICD-10-PCS | Mod: CPTII,S$GLB,, | Performed by: PHYSICIAN ASSISTANT

## 2023-07-03 PROCEDURE — 99213 PR OFFICE/OUTPT VISIT, EST, LEVL III, 20-29 MIN: ICD-10-PCS | Mod: S$GLB,,, | Performed by: PHYSICIAN ASSISTANT

## 2023-07-03 PROCEDURE — 1125F AMNT PAIN NOTED PAIN PRSNT: CPT | Mod: CPTII,S$GLB,, | Performed by: PHYSICIAN ASSISTANT

## 2023-07-03 PROCEDURE — 3288F PR FALLS RISK ASSESSMENT DOCUMENTED: ICD-10-PCS | Mod: CPTII,S$GLB,, | Performed by: PHYSICIAN ASSISTANT

## 2023-07-03 PROCEDURE — 3080F PR MOST RECENT DIASTOLIC BLOOD PRESSURE >= 90 MM HG: ICD-10-PCS | Mod: CPTII,S$GLB,, | Performed by: PHYSICIAN ASSISTANT

## 2023-07-03 PROCEDURE — 1160F RVW MEDS BY RX/DR IN RCRD: CPT | Mod: CPTII,S$GLB,, | Performed by: PHYSICIAN ASSISTANT

## 2023-07-03 PROCEDURE — 1125F PR PAIN SEVERITY QUANTIFIED, PAIN PRESENT: ICD-10-PCS | Mod: CPTII,S$GLB,, | Performed by: PHYSICIAN ASSISTANT

## 2023-07-03 PROCEDURE — 72110 XR LUMBAR SPINE COMPLETE 5 VIEW: ICD-10-PCS | Mod: 26,,, | Performed by: RADIOLOGY

## 2023-07-03 PROCEDURE — 3077F PR MOST RECENT SYSTOLIC BLOOD PRESSURE >= 140 MM HG: ICD-10-PCS | Mod: CPTII,S$GLB,, | Performed by: PHYSICIAN ASSISTANT

## 2023-07-03 PROCEDURE — 99999 PR PBB SHADOW E&M-EST. PATIENT-LVL V: ICD-10-PCS | Mod: PBBFAC,,, | Performed by: PHYSICIAN ASSISTANT

## 2023-07-03 PROCEDURE — 3288F FALL RISK ASSESSMENT DOCD: CPT | Mod: CPTII,S$GLB,, | Performed by: PHYSICIAN ASSISTANT

## 2023-07-03 PROCEDURE — 3080F DIAST BP >= 90 MM HG: CPT | Mod: CPTII,S$GLB,, | Performed by: PHYSICIAN ASSISTANT

## 2023-07-03 PROCEDURE — 1101F PT FALLS ASSESS-DOCD LE1/YR: CPT | Mod: CPTII,S$GLB,, | Performed by: PHYSICIAN ASSISTANT

## 2023-07-03 PROCEDURE — 99213 OFFICE O/P EST LOW 20 MIN: CPT | Mod: S$GLB,,, | Performed by: PHYSICIAN ASSISTANT

## 2023-07-03 PROCEDURE — 3008F BODY MASS INDEX DOCD: CPT | Mod: CPTII,S$GLB,, | Performed by: PHYSICIAN ASSISTANT

## 2023-07-03 PROCEDURE — 72110 X-RAY EXAM L-2 SPINE 4/>VWS: CPT | Mod: TC,PO

## 2023-07-03 PROCEDURE — 3077F SYST BP >= 140 MM HG: CPT | Mod: CPTII,S$GLB,, | Performed by: PHYSICIAN ASSISTANT

## 2023-07-03 PROCEDURE — 72110 X-RAY EXAM L-2 SPINE 4/>VWS: CPT | Mod: 26,,, | Performed by: RADIOLOGY

## 2023-07-03 NOTE — PROGRESS NOTES
[facility] New Patient Evaluation      Referred by: Sebastian Montiel III    PCP:  Sebastian Montiel PA-C    CC:   Chief Complaint   Patient presents with    Low-back Pain     Pain radiates down the right leg.          HPI:   Aleida Bliss is a 73 y.o. year old female patient who has a past medical history of Arthritis, Coronary artery disease, GERD (gastroesophageal reflux disease), Hypertension, and SOB (shortness of breath). She presents in referral from Sebastian Montiel III for back and right leg pain. Pain has been present since 2019.  She has pain in the lower back with raidation to the right posterior leg extending to mid calf.  Denies numbness/ tignling.  She has difficulty balancing because of pain.  She has tried medications and currently taking norco and prednisone taper.  PT in 2022 did not help much.    Denies bowel/ bladder incontinence.    Past and current medications:  Antineuropathics:  NSAIDs:  Antidepressants:  Muscle relaxers:  Opioids:  norco  Antiplatelets/Anticoagulants:  Others;  prednisone taper    Physical Therapy/ Chiropractic care:  PT - 2022    Pain Intervention History:  none    Past Spine Surgical History:  none        History:    Current Outpatient Medications:     albuterol (PROVENTIL/VENTOLIN HFA) 90 mcg/actuation inhaler, Inhale 1-2 puffs into the lungs every 6 (six) hours as needed for Wheezing. Rescue, Disp: 8 g, Rfl: 0    atenoloL (TENORMIN) 50 MG tablet, Take 1 tablet (50 mg total) by mouth once daily., Disp: 90 tablet, Rfl: 3    cetirizine (ZYRTEC) 10 MG tablet, Take 10 mg by mouth once daily., Disp: , Rfl:     ergocalciferol (ERGOCALCIFEROL) 50,000 unit Cap, Take 1 capsule (50,000 Units total) by mouth every Monday., Disp: 12 capsule, Rfl: 3    fluticasone propionate (FLONASE) 50 mcg/actuation nasal spray, 2 sprays (100 mcg total) by Each Nostril route once daily., Disp: 3 g, Rfl: 3    HYDROcodone-acetaminophen (NORCO) 7.5-325 mg per tablet, Take 1 tablet by mouth every  8 (eight) hours as needed for Pain., Disp: 20 tablet, Rfl: 0    magnesium oxide (MAG-OX) 400 mg (241.3 mg magnesium) tablet, Take 1 tablet (400 mg total) by mouth once daily., Disp: 90 tablet, Rfl: 3    montelukast (SINGULAIR) 10 mg tablet, TAKE 1 TABLET EVERY EVENING, Disp: 90 tablet, Rfl: 3    omeprazole (PRILOSEC) 40 MG capsule, Take 1 capsule (40 mg total) by mouth once daily., Disp: 90 capsule, Rfl: 3    potassium chloride (KLOR-CON 10) 10 MEQ TbSR, Take 1 tablet (10 mEq total) by mouth once daily., Disp: 90 tablet, Rfl: 1    pravastatin (PRAVACHOL) 40 MG tablet, Take 1 tablet (40 mg total) by mouth every evening., Disp: 90 tablet, Rfl: 1    predniSONE (DELTASONE) 10 MG tablet, Take 3 daily for 3 days, then 2 daily for three days, then 1 daily for three days., Disp: 18 tablet, Rfl: 0    sildenafil (REVATIO) 20 mg Tab, Take 1 tablet (20 mg total) by mouth 3 (three) times daily., Disp: 270 tablet, Rfl: 3    sucralfate (CARAFATE) 1 gram tablet, Take 1 g by mouth every evening., Disp: , Rfl:     triamcinolone acetonide 0.1% (KENALOG) 0.1 % cream, Apply topically 2 (two) times daily., Disp: 80 g, Rfl: 3    umeclidinium-vilanteroL (ANORO ELLIPTA) 62.5-25 mcg/actuation DsDv, Inhale 1 puff into the lungs as needed., Disp: , Rfl:     Past Medical History:   Diagnosis Date    Arthritis     Coronary artery disease     GERD (gastroesophageal reflux disease)     Hypertension     SOB (shortness of breath)        Past Surgical History:   Procedure Laterality Date    CATARACT EXTRACTION EXTRACAPSULAR W/ INTRAOCULAR LENS IMPLANTATION      CATHETERIZATION OF BOTH LEFT AND RIGHT HEART N/A 02/15/2022    Procedure: CATHETERIZATION, HEART, BOTH LEFT AND RIGHT;  Surgeon: Cuong Hinkle MD;  Location: [facility] CATH;  Service: Cardiology;  Laterality: N/A;    CHOLECYSTECTOMY      COLONOSCOPY      COLONOSCOPY N/A 3/15/2022    Procedure: COLONOSCOPY;  Surgeon: Varun Toro MD;  Location: [facility] ENDO;  Service: Endoscopy;  Laterality:  "N/A;    HYSTERECTOMY         Family History   Problem Relation Age of Onset    Heart disease Mother     Cancer Father         prostate       Social History     Socioeconomic History    Marital status:    Tobacco Use    Smoking status: Never    Smokeless tobacco: Never   Substance and Sexual Activity    Alcohol use: No    Drug use: No    Sexual activity: Not Currently       Review of patient's allergies indicates:   Allergen Reactions    Thiazides Other (See Comments)     Elevates calcium    Lisinopril Other (See Comments)     Other reaction(s): Cough      Cyclobenzaprine Rash    Losartan Nausea Only     Other reaction(s): Unknown         Labs:  Lab Results   Component Value Date    HGBA1C 6.2 (H) 12/15/2021       Lab Results   Component Value Date    WBC 5.23 06/21/2023    HGB 12.2 06/21/2023    HCT 39.2 06/21/2023    MCV 93 06/21/2023     06/21/2023           Review of Systems:  Low back pain,  right leg pain..  Balance of review of systems is negative.    Physical Exam:  Vitals:    07/03/23 0920   BP: (!) 180/99   Pulse: 68   Weight: 80.7 kg (178 lb 0.3 oz)   Height: 5' 6" (1.676 m)   PainSc:   6   PainLoc: Back     Body mass index is 28.73 kg/m².    Gen: NAD  Psych: mood appropriate for given condition  HEENT: eyes anicteric   CV: RRR, 2+ radial pulse  HEENT: anicteric   Respiratory: non-labored, no signs of respiratory distress  Abd: non-distended  Skin: warm, dry and intact.  Gait: Able to heel walk, toe walk. No antalgic gait.     Coordination:   Romberg: negative  Finger to nose coordination: normal  Heel to shin coordination: normal  Tandem walking coordination: normal    Cervical spine: ROM is full in flexion, extension and lateral rotation without increased pain.  Spurling's maneuver causes no neck pain to either side.  Myofascial exam: No Tenderness to palpation across cervical paraspinous region bilaterally.    Lumbar spine:  Lumbar spine: ROM is full with flexion extension and oblique " extension with no increased pain.    Lucho's test causes no increased pain on either side.    Supine straight leg raise is negative bilaterally.    Internal and external rotation of the hip causes no increased pain on either side.  Myofascial exam: No tenderness to palpation across lumbar paraspinous muscles. No tenderness to palpation over the bilateral greater trochanters and bilateral SI joint    Sensory:  Intact and symmetrical to light touch in C4-T1 dermatomes bilaterally. Intact and symmetrical to light touch in L1-S1 dermatomes bilaterally.    Motor:    Right Left   C4 Shoulder Abduction  5  5   C5 Elbow Flexion    5  5   C6 Wrist Extension  5  5   C7 Elbow Extension   5  5   C8/T1 Hand Intrinsics   5  5        Right Left   L2/3 Iliacus Hip flexion  5  5   L3/4 Qudratus Femoris Knee Extension  5  5   L4/5 Tib Anterior Ankle Dorsiflexion   5  5   L5/S1 Extensor Hallicus Longus Great toe extension  5  5   S1/S2 Gastroc/Soleus Plantar Flexion  5  5      Right Left   Triceps DTR 2+ 2+   Biceps DTR 2+ 2+   Brachioradialis DTR 2+ 2+   Patellar DTR 2+ 2+   Achilles DTR 2+ 2+   Rajan Absent  Absent   Clonus Absent Absent   Babinski Absent Absent       Imaging:    MRI lumbar spine report only from [facility] dated 11-:    Vertebral bodies are normal in height and alignment. No osseous edema or acute fracture. Mild disc space narrowing and disc desiccation is at L3-4.   Conus medullaris terminates at the L1 vertebral body level.   L1-2: Minimal facet hypertrophy without stenosis.   L2-3: Mild facet hypertrophy without stenosis.   L3-4: Small biconvex disc bulge and facet hypertrophy results in mild lateral neural foraminal and lateral recess stenosis with minimal spinal canal narrowing.   L4-5: Small broad-based disc bulge and facet hypertrophy results in mild right neural foraminal and lateral recess stenosis. Spinal canal and left neural foramen are patent.   L5-S1: Facet and ligamentum flavum hypertrophy  without significant stenosis.     Xray lumbar spine 7-3-23:  Vertebral body heights are preserved.  Diffuse bony demineralization.  No fractures or bony destructive process.  Lower lumbar predominant facet arthrosis.  No malalignment or spondylolysis.  Mild disc height loss at L5-S1.        Assessment:   Aleida Bliss is a 73 y.o. year old female patient who has a past medical history of Arthritis, Coronary artery disease, GERD (gastroesophageal reflux disease), Hypertension, and SOB (shortness of breath). She presents in referral from Sebastian Montiel III for lower back and right leg pain.  Possible radiculopathy from lower lumbar degenerative changes noted on current xrays.  She did have some right foraminal narrowing noted at L4/5 on 2019 MRI report as well.    Problem List Items Addressed This Visit    None  Visit Diagnoses       Sciatica of right side                Plan:  - pain  persists despite PT and medications.  Recommend updating imaging with MRI lumbar spine.  - follow up after imaging.        Follow Up: RTC after imaging    : Reviewed and consistent with medication use as prescribed.    Thank you for referring this interesting patient, and I look forward to continuing to collaborate in her care.        Christina Rocha PA-C  [facility]

## 2023-07-07 ENCOUNTER — SPECIALTY PHARMACY (OUTPATIENT)
Dept: PHARMACY | Facility: CLINIC | Age: 73
End: 2023-07-07
Payer: MEDICARE

## 2023-07-07 ENCOUNTER — TELEPHONE (OUTPATIENT)
Dept: PRIMARY CARE CLINIC | Facility: CLINIC | Age: 73
End: 2023-07-07
Payer: MEDICARE

## 2023-07-07 DIAGNOSIS — I27.20 SEVERE PULMONARY HYPERTENSION: ICD-10-CM

## 2023-07-07 RX ORDER — SILDENAFIL CITRATE 20 MG/1
20 TABLET ORAL 3 TIMES DAILY
Qty: 270 TABLET | Refills: 3 | Status: ACTIVE | OUTPATIENT
Start: 2023-07-07 | End: 2024-07-06

## 2023-07-07 NOTE — TELEPHONE ENCOUNTER
Outgoing call to pt regarding Revatio refill. Rx is , request for new rx was sent to authorizing provider, confirmed with pt. Will follow up once new rx is received.

## 2023-07-07 NOTE — TELEPHONE ENCOUNTER
----- Message from Sebastian Montiel III, PA-C sent at 7/3/2023  9:31 AM CDT -----  Aleida Bliss    The x-rays show arthritic changes and some disk protrusion which can cause your symptoms. If you are not better in a few weeks, please let un know.

## 2023-07-10 NOTE — TELEPHONE ENCOUNTER
Outgoing call regarding Cassidy refill, pt states she has missed two to three days of her medication, due to being out of medication. Transferred call to assigned [facility].

## 2023-07-10 NOTE — TELEPHONE ENCOUNTER
Specialty Pharmacy - Refill Coordination  Specialty Pharmacy - Clinical Intervention    Specialty Medication Orders Linked to Encounter      Flowsheet Row Most Recent Value   Medication #1 sildenafil (REVATIO) 20 mg Tab (Order#[id number], Rx#[id number])            Refill Questions - Documented Responses      Flowsheet Row Most Recent Value   Patient Availability and HIPAA Verification    Does patient want to proceed with activity? Yes   HIPAA/medical authority confirmed? Yes   Relationship to patient of person spoken to? Self   Refill Screening Questions    Changes to allergies? No   Changes to medications? No   New conditions since last clinic visit? No   Unplanned office visit, urgent care, ED, or hospital admission in the last 4 weeks? No   How does patient/caregiver feel medication is working? Good   Financial problems or insurance changes? No   How many doses of your specialty medications were missed in the last 4 weeks? --  [Pt missed 3 days of Sildenafil due to running out of medication. She should resume tomorrow after package is received with normal dosing schedule. She should not double up doses to make up for missed doses.]   Would patient like to speak to a pharmacist? No   When does the patient need to receive the medication? 07/11/23   Refill Delivery Questions    How will the patient receive the medication? MEDRx   When does the patient need to receive the medication? 07/11/23   Shipping Address Home   Address in [facility] confirmed and updated if neccessary? Yes   Expected Copay ($) 0   Is the patient able to afford the medication copay? Yes   Payment Method zero copay   Days supply of Refill 30   Supplies needed? No supplies needed   Refill activity completed? Yes   Refill activity plan Refill scheduled   Shipment/Pickup Date: 07/10/23            Current Outpatient Medications   Medication Sig    albuterol (PROVENTIL/VENTOLIN HFA) 90 mcg/actuation inhaler Inhale 1-2 puffs into the lungs  every 6 (six) hours as needed for Wheezing. Rescue (Patient not taking: Reported on 7/7/2023)    atenoloL (TENORMIN) 50 MG tablet Take 1 tablet (50 mg total) by mouth once daily.    cetirizine (ZYRTEC) 10 MG tablet Take 10 mg by mouth once daily.    ergocalciferol (ERGOCALCIFEROL) 50,000 unit Cap Take 1 capsule (50,000 Units total) by mouth every Monday.    fluticasone propionate (FLONASE) 50 mcg/actuation nasal spray 2 sprays (100 mcg total) by Each Nostril route once daily.    furosemide (LASIX) 20 MG tablet Take 1 tablet (20 mg total) by mouth once daily.    HYDROcodone-acetaminophen (NORCO) 7.5-325 mg per tablet Take 1 tablet by mouth every 8 (eight) hours as needed for Pain.    magnesium oxide (MAG-OX) 400 mg (241.3 mg magnesium) tablet Take 1 tablet (400 mg total) by mouth once daily.    montelukast (SINGULAIR) 10 mg tablet TAKE 1 TABLET EVERY EVENING    omeprazole (PRILOSEC) 40 MG capsule Take 1 capsule (40 mg total) by mouth once daily.    potassium chloride (KLOR-CON 10) 10 MEQ TbSR Take 1 tablet (10 mEq total) by mouth once daily.    pravastatin (PRAVACHOL) 40 MG tablet Take 1 tablet (40 mg total) by mouth every evening.    predniSONE (DELTASONE) 10 MG tablet Take 3 daily for 3 days, then 2 daily for three days, then 1 daily for three days.    sildenafil (REVATIO) 20 mg Tab Take 1 tablet (20 mg total) by mouth 3 (three) times daily.    sucralfate (CARAFATE) 1 gram tablet Take 1 g by mouth every evening.    triamcinolone acetonide 0.1% (KENALOG) 0.1 % cream Apply topically 2 (two) times daily.   Last reviewed on 7/7/2023 12:48 PM by Maria De Jesus Chávez NP    Review of patient's allergies indicates:   Allergen Reactions    Thiazides Other (See Comments)     Elevates calcium    Lisinopril Other (See Comments)     Other reaction(s): Cough      Cyclobenzaprine Rash    Losartan Nausea Only     Other reaction(s): Unknown      Last reviewed on  7/7/2023 12:48 PM by Maria De Jesus A Chávez      Tasks added this encounter   No tasks  added.   Tasks due within next 3 months   No tasks due.     Joselyn Jensen, PharmD  [facility]  [address]  [facility] [address]  Phone: [phone]  Fax: [phone]

## 2023-07-18 ENCOUNTER — HOSPITAL ENCOUNTER (OUTPATIENT)
Dept: RADIOLOGY | Facility: HOSPITAL | Age: 73
Discharge: HOME | End: 2023-07-18
Attending: PHYSICIAN ASSISTANT
Payer: MEDICARE

## 2023-07-18 DIAGNOSIS — M47.816 LUMBAR SPONDYLOSIS: ICD-10-CM

## 2023-07-18 DIAGNOSIS — M54.31 SCIATICA OF RIGHT SIDE: ICD-10-CM

## 2023-07-18 DIAGNOSIS — M54.16 LUMBAR RADICULOPATHY, CHRONIC: ICD-10-CM

## 2023-07-18 PROCEDURE — 72148 MRI LUMBAR SPINE W/O DYE: CPT | Mod: TC,PO

## 2023-07-18 PROCEDURE — 72148 MRI LUMBAR SPINE W/O DYE: CPT | Mod: 26,,, | Performed by: RADIOLOGY

## 2023-07-18 PROCEDURE — 72148 MRI LUMBAR SPINE WITHOUT CONTRAST: ICD-10-PCS | Mod: 26,,, | Performed by: RADIOLOGY

## 2023-07-20 DIAGNOSIS — I25.118 CORONARY ARTERY DISEASE OF NATIVE ARTERY OF NATIVE HEART WITH STABLE ANGINA PECTORIS: ICD-10-CM

## 2023-07-20 DIAGNOSIS — I10 PRIMARY HYPERTENSION: ICD-10-CM

## 2023-07-20 RX ORDER — ATENOLOL 50 MG/1
50 TABLET ORAL DAILY
Qty: 90 TABLET | Refills: 3 | Status: SHIPPED | OUTPATIENT
Start: 2023-07-20

## 2023-07-26 ENCOUNTER — TELEPHONE (OUTPATIENT)
Dept: PAIN MEDICINE | Facility: CLINIC | Age: 73
End: 2023-07-26
Payer: MEDICARE

## 2023-07-26 NOTE — TELEPHONE ENCOUNTER
I spoke with Mrs. Bliss and she has scheduled an appointment to see Christina Rocha 7-28-23 to review the results of the MRI, she indicated understanding.

## 2023-07-26 NOTE — TELEPHONE ENCOUNTER
----- Message from Christina Rocha PA-C sent at 7/26/2023  2:42 PM CDT -----  Results Reviewed.  Please call with below:    MRI shows some arthritic chagnes in the lower back and some disc bulging.  Pleas schedule clinic follow up to discuss further as well as treatment recommendations.

## 2023-07-28 ENCOUNTER — OFFICE VISIT (OUTPATIENT)
Dept: PAIN MEDICINE | Facility: CLINIC | Age: 73
End: 2023-07-28
Payer: MEDICARE

## 2023-07-28 VITALS
HEART RATE: 69 BPM | DIASTOLIC BLOOD PRESSURE: 91 MMHG | SYSTOLIC BLOOD PRESSURE: 162 MMHG | HEIGHT: 66 IN | WEIGHT: 177 LBS | BODY MASS INDEX: 28.45 KG/M2

## 2023-07-28 DIAGNOSIS — G89.29 CHRONIC RIGHT-SIDED LOW BACK PAIN WITHOUT SCIATICA: ICD-10-CM

## 2023-07-28 DIAGNOSIS — M47.816 LUMBAR SPONDYLOSIS: Primary | ICD-10-CM

## 2023-07-28 DIAGNOSIS — M54.50 CHRONIC RIGHT-SIDED LOW BACK PAIN WITHOUT SCIATICA: ICD-10-CM

## 2023-07-28 PROCEDURE — 3288F PR FALLS RISK ASSESSMENT DOCUMENTED: ICD-10-PCS | Mod: CPTII,S$GLB,, | Performed by: PHYSICIAN ASSISTANT

## 2023-07-28 PROCEDURE — 1159F PR MEDICATION LIST DOCUMENTED IN MEDICAL RECORD: ICD-10-PCS | Mod: CPTII,S$GLB,, | Performed by: PHYSICIAN ASSISTANT

## 2023-07-28 PROCEDURE — 3077F SYST BP >= 140 MM HG: CPT | Mod: CPTII,S$GLB,, | Performed by: PHYSICIAN ASSISTANT

## 2023-07-28 PROCEDURE — 1159F MED LIST DOCD IN RCRD: CPT | Mod: CPTII,S$GLB,, | Performed by: PHYSICIAN ASSISTANT

## 2023-07-28 PROCEDURE — 3080F PR MOST RECENT DIASTOLIC BLOOD PRESSURE >= 90 MM HG: ICD-10-PCS | Mod: CPTII,S$GLB,, | Performed by: PHYSICIAN ASSISTANT

## 2023-07-28 PROCEDURE — 3008F BODY MASS INDEX DOCD: CPT | Mod: CPTII,S$GLB,, | Performed by: PHYSICIAN ASSISTANT

## 2023-07-28 PROCEDURE — 1160F RVW MEDS BY RX/DR IN RCRD: CPT | Mod: CPTII,S$GLB,, | Performed by: PHYSICIAN ASSISTANT

## 2023-07-28 PROCEDURE — 3008F PR BODY MASS INDEX (BMI) DOCUMENTED: ICD-10-PCS | Mod: CPTII,S$GLB,, | Performed by: PHYSICIAN ASSISTANT

## 2023-07-28 PROCEDURE — 99214 PR OFFICE/OUTPT VISIT, EST, LEVL IV, 30-39 MIN: ICD-10-PCS | Mod: S$GLB,,, | Performed by: PHYSICIAN ASSISTANT

## 2023-07-28 PROCEDURE — 3077F PR MOST RECENT SYSTOLIC BLOOD PRESSURE >= 140 MM HG: ICD-10-PCS | Mod: CPTII,S$GLB,, | Performed by: PHYSICIAN ASSISTANT

## 2023-07-28 PROCEDURE — 99999 PR PBB SHADOW E&M-EST. PATIENT-LVL IV: ICD-10-PCS | Mod: PBBFAC,,, | Performed by: PHYSICIAN ASSISTANT

## 2023-07-28 PROCEDURE — 1101F PR PT FALLS ASSESS DOC 0-1 FALLS W/OUT INJ PAST YR: ICD-10-PCS | Mod: CPTII,S$GLB,, | Performed by: PHYSICIAN ASSISTANT

## 2023-07-28 PROCEDURE — 99214 OFFICE O/P EST MOD 30 MIN: CPT | Mod: S$GLB,,, | Performed by: PHYSICIAN ASSISTANT

## 2023-07-28 PROCEDURE — 1160F PR REVIEW ALL MEDS BY PRESCRIBER/CLIN PHARMACIST DOCUMENTED: ICD-10-PCS | Mod: CPTII,S$GLB,, | Performed by: PHYSICIAN ASSISTANT

## 2023-07-28 PROCEDURE — 1125F PR PAIN SEVERITY QUANTIFIED, PAIN PRESENT: ICD-10-PCS | Mod: CPTII,S$GLB,, | Performed by: PHYSICIAN ASSISTANT

## 2023-07-28 PROCEDURE — 3288F FALL RISK ASSESSMENT DOCD: CPT | Mod: CPTII,S$GLB,, | Performed by: PHYSICIAN ASSISTANT

## 2023-07-28 PROCEDURE — 1101F PT FALLS ASSESS-DOCD LE1/YR: CPT | Mod: CPTII,S$GLB,, | Performed by: PHYSICIAN ASSISTANT

## 2023-07-28 PROCEDURE — 3080F DIAST BP >= 90 MM HG: CPT | Mod: CPTII,S$GLB,, | Performed by: PHYSICIAN ASSISTANT

## 2023-07-28 PROCEDURE — 99999 PR PBB SHADOW E&M-EST. PATIENT-LVL IV: CPT | Mod: PBBFAC,,, | Performed by: PHYSICIAN ASSISTANT

## 2023-07-28 PROCEDURE — 1125F AMNT PAIN NOTED PAIN PRSNT: CPT | Mod: CPTII,S$GLB,, | Performed by: PHYSICIAN ASSISTANT

## 2023-07-28 RX ORDER — TIZANIDINE 4 MG/1
4 TABLET ORAL NIGHTLY PRN
Qty: 40 TABLET | Refills: 0 | Status: SHIPPED | OUTPATIENT
Start: 2023-07-28

## 2023-07-28 NOTE — PROGRESS NOTES
[facility] Follow Up      PCP:  Sebastian Montiel PA-C    CC:   Chief Complaint   Patient presents with    Follow-up     MRI results for LBP.          HPI:     Ms. Shin returns for follow up of lower back and right leg pain after undergoing imaging.  She continues to have pain in the right lower lumbar region.  Since last visit, right leg pain has improved and denies any leg pain today.  She has been taking norco for pain. She did try PT in the past.    Initial HPI:  Aleida Bliss is a 73 y.o. year old female patient who has a past medical history of Arthritis, Coronary artery disease, GERD (gastroesophageal reflux disease), Hypertension, and SOB (shortness of breath). She presents in referral for back and right leg pain. Pain has been present since 2019.  She has pain in the lower back with raidation to the right posterior leg extending to mid calf.  Denies numbness/ tignling.  She has difficulty balancing because of pain.  She has tried medications and currently taking norco and prednisone taper.  PT in 2022 did not help much.    Denies bowel/ bladder incontinence.    Past and current medications:  Antineuropathics:  NSAIDs:  Antidepressants:  Muscle relaxers:  Opioids:  norco  Antiplatelets/Anticoagulants:  Others;  prednisone taper completed    Physical Therapy/ Chiropractic care:  PT - 2022 without benefit    Pain Intervention History:  none    Past Spine Surgical History:  none        History:    Current Outpatient Medications:     atenoloL (TENORMIN) 50 MG tablet, Take 1 tablet (50 mg total) by mouth once daily., Disp: 90 tablet, Rfl: 3    cetirizine (ZYRTEC) 10 MG tablet, Take 10 mg by mouth once daily., Disp: , Rfl:     ergocalciferol (ERGOCALCIFEROL) 50,000 unit Cap, Take 1 capsule (50,000 Units total) by mouth every Monday., Disp: 12 capsule, Rfl: 3    fluticasone propionate (FLONASE) 50 mcg/actuation nasal spray, 2 sprays (100 mcg total) by Each Nostril route once daily., Disp: 3 g, Rfl: 3     furosemide (LASIX) 20 MG tablet, Take 1 tablet (20 mg total) by mouth once daily., Disp: 30 tablet, Rfl: 2    HYDROcodone-acetaminophen (NORCO) 7.5-325 mg per tablet, Take 1 tablet by mouth every 8 (eight) hours as needed for Pain., Disp: 20 tablet, Rfl: 0    magnesium oxide (MAG-OX) 400 mg (241.3 mg magnesium) tablet, Take 1 tablet (400 mg total) by mouth once daily., Disp: 90 tablet, Rfl: 3    montelukast (SINGULAIR) 10 mg tablet, TAKE 1 TABLET EVERY EVENING, Disp: 90 tablet, Rfl: 3    omeprazole (PRILOSEC) 40 MG capsule, Take 1 capsule (40 mg total) by mouth once daily., Disp: 90 capsule, Rfl: 3    potassium chloride (KLOR-CON 10) 10 MEQ TbSR, Take 1 tablet (10 mEq total) by mouth once daily., Disp: 90 tablet, Rfl: 1    pravastatin (PRAVACHOL) 40 MG tablet, Take 1 tablet (40 mg total) by mouth every evening., Disp: 90 tablet, Rfl: 1    predniSONE (DELTASONE) 10 MG tablet, Take 3 daily for 3 days, then 2 daily for three days, then 1 daily for three days., Disp: 18 tablet, Rfl: 0    sildenafil (REVATIO) 20 mg Tab, Take 1 tablet (20 mg total) by mouth 3 (three) times daily., Disp: 270 tablet, Rfl: 3    sucralfate (CARAFATE) 1 gram tablet, Take 1 g by mouth every evening., Disp: , Rfl:     triamcinolone acetonide 0.1% (KENALOG) 0.1 % cream, Apply topically 2 (two) times daily., Disp: 80 g, Rfl: 3    albuterol (PROVENTIL/VENTOLIN HFA) 90 mcg/actuation inhaler, Inhale 1-2 puffs into the lungs every 6 (six) hours as needed for Wheezing. Rescue (Patient not taking: Reported on 7/7/2023), Disp: 8 g, Rfl: 0    Past Medical History:   Diagnosis Date    Arthritis     Coronary artery disease     GERD (gastroesophageal reflux disease)     Hypertension     SOB (shortness of breath)        Past Surgical History:   Procedure Laterality Date    CATARACT EXTRACTION EXTRACAPSULAR W/ INTRAOCULAR LENS IMPLANTATION      CATHETERIZATION OF BOTH LEFT AND RIGHT HEART N/A 02/15/2022    Procedure: CATHETERIZATION, HEART, BOTH LEFT AND  "RIGHT;  Surgeon: Cuong Hinkle MD;  Location: [facility] CATH;  Service: Cardiology;  Laterality: N/A;    CHOLECYSTECTOMY      COLONOSCOPY      COLONOSCOPY N/A 3/15/2022    Procedure: COLONOSCOPY;  Surgeon: Varun Toro MD;  Location: [facility] ENDO;  Service: Endoscopy;  Laterality: N/A;    HYSTERECTOMY         Family History   Problem Relation Age of Onset    Heart disease Mother     Cancer Father         prostate       Social History     Socioeconomic History    Marital status:    Tobacco Use    Smoking status: Never    Smokeless tobacco: Never   Substance and Sexual Activity    Alcohol use: No    Drug use: No    Sexual activity: Not Currently       Review of patient's allergies indicates:   Allergen Reactions    Thiazides Other (See Comments)     Elevates calcium    Lisinopril Other (See Comments)     Other reaction(s): Cough      Cyclobenzaprine Rash    Losartan Nausea Only     Other reaction(s): Unknown         Labs:  Lab Results   Component Value Date    HGBA1C 6.2 (H) 12/15/2021       Lab Results   Component Value Date    WBC 5.23 06/21/2023    HGB 12.2 06/21/2023    HCT 39.2 06/21/2023    MCV 93 06/21/2023     06/21/2023           Review of Systems:  Low back pain,  right leg pain..  Balance of review of systems is negative.    Physical Exam:  Vitals:    07/28/23 0919   BP: (!) 162/91   Pulse: 69   Weight: 80.3 kg (177 lb 0.5 oz)   Height: 5' 6" (1.676 m)   PainSc:   4   PainLoc: Back     Body mass index is 28.57 kg/m².    Gen: NAD  Psych: mood appropriate for given condition  HEENT: eyes anicteric   CV: RRR, 2+ radial pulse  HEENT: anicteric   Respiratory: non-labored, no signs of respiratory distress  Abd: non-distended  Skin: warm, dry and intact.  Gait: Able to heel walk, toe walk. No antalgic gait.     Coordination:   Romberg: negative  Finger to nose coordination: normal  Heel to shin coordination: normal  Tandem walking coordination: normal    Cervical spine: ROM is full in flexion, " extension and lateral rotation without increased pain.  Spurling's maneuver causes no neck pain to either side.  Myofascial exam: No Tenderness to palpation across cervical paraspinous region bilaterally.    Lumbar spine:  Lumbar spine: ROM is full with flexion extension and oblique extension with no increased pain.    Lucho's test causes no increased pain on either side.    Supine straight leg raise is negative bilaterally.    Internal and external rotation of the hip causes no increased pain on either side.  Myofascial exam: No tenderness to palpation across lumbar paraspinous muscles. No tenderness to palpation over the bilateral greater trochanters and bilateral SI joint    Sensory:  Intact and symmetrical to light touch in C4-T1 dermatomes bilaterally. Intact and symmetrical to light touch in L1-S1 dermatomes bilaterally.    Motor:    Right Left   C4 Shoulder Abduction  5  5   C5 Elbow Flexion    5  5   C6 Wrist Extension  5  5   C7 Elbow Extension   5  5   C8/T1 Hand Intrinsics   5  5        Right Left   L2/3 Iliacus Hip flexion  5  5   L3/4 Qudratus Femoris Knee Extension  5  5   L4/5 Tib Anterior Ankle Dorsiflexion   5  5   L5/S1 Extensor Hallicus Longus Great toe extension  5  5   S1/S2 Gastroc/Soleus Plantar Flexion  5  5      Right Left   Triceps DTR 2+ 2+   Biceps DTR 2+ 2+   Brachioradialis DTR 2+ 2+   Patellar DTR 2+ 2+   Achilles DTR 2+ 2+   Rajan Absent  Absent   Clonus Absent Absent   Babinski Absent Absent       Imaging:    MRI lumbar spine report only from [facility] dated 11-:    Vertebral bodies are normal in height and alignment. No osseous edema or acute fracture. Mild disc space narrowing and disc desiccation is at L3-4.   Conus medullaris terminates at the L1 vertebral body level.   L1-2: Minimal facet hypertrophy without stenosis.   L2-3: Mild facet hypertrophy without stenosis.   L3-4: Small biconvex disc bulge and facet hypertrophy results in mild lateral neural foraminal and lateral  recess stenosis with minimal spinal canal narrowing.   L4-5: Small broad-based disc bulge and facet hypertrophy results in mild right neural foraminal and lateral recess stenosis. Spinal canal and left neural foramen are patent.   L5-S1: Facet and ligamentum flavum hypertrophy without significant stenosis.     Xray lumbar spine 7-3-23:  Vertebral body heights are preserved.  Diffuse bony demineralization.  No fractures or bony destructive process.  Lower lumbar predominant facet arthrosis.  No malalignment or spondylolysis.  Mild disc height loss at L5-S1.      MRI lumbar spine 7-18-23:  age appropriate degenerative chagnes.  L3/4 minimal left disk hernia with possible contact of the left L4 nerve.  L4-L5  ligamentum flavum thickening and minor facet arthropathy with a minimal right subarticular/foraminal disc protrusion possibley contacting the ventral aspect of the descending right L5 nerve in the right lateral recess.        Assessment:   Aleida Bliss is a 73 y.o. year old female patient who has a past medical history of Arthritis, Coronary artery disease, GERD (gastroesophageal reflux disease), Hypertension, and SOB (shortness of breath). She presents for lower back and right leg pain.  She has degenerative chagnes with facet arthropathy at L3/4, L4/5 possible contributing to back pain.  She does have small left L3/4 and small right L4/5 disk bulge, but no significant radicular pain at this time.     Problem List Items Addressed This Visit    None        Plan:  - to help with pain, we discussed PT (which did not help in the past) and right L3/4, L4/5 joint space MBB/ RFA with pain management.  - she elects to continue on her own with managing pain; she is not anxius for further PT or trying procedures at this time.  - I prescribed zanaflex to help vicente antonin.  - follow up as needed.         Follow Up: RTC as needed.     : Reviewed and consistent with medication use as prescribed.    Thank you for referring  this interesting patient, and I look forward to continuing to collaborate in her care.        Christina Rocha PA-C  [facility]

## 2023-07-31 NOTE — TELEPHONE ENCOUNTER
Good morning,     Patient has a upcoming appointment on 8/8 with NP Maria De Jesus Chávez and has not been cleared yet.     Thank you

## 2023-08-02 ENCOUNTER — SPECIALTY PHARMACY (OUTPATIENT)
Dept: PHARMACY | Facility: CLINIC | Age: 73
End: 2023-08-02
Payer: MEDICARE

## 2023-08-02 NOTE — TELEPHONE ENCOUNTER
Specialty Pharmacy - Refill Coordination    Specialty Medication Orders Linked to Encounter      Flowsheet Row Most Recent Value   Medication #1 sildenafil (REVATIO) 20 mg Tab (Order#[id number], Rx#[id number])            Refill Questions - Documented Responses      Flowsheet Row Most Recent Value   Patient Availability and HIPAA Verification    Does patient want to proceed with activity? Yes   HIPAA/medical authority confirmed? Yes   Relationship to patient of person spoken to? Self   Refill Screening Questions    Changes to allergies? No   Changes to medications? No   New conditions since last clinic visit? No   Unplanned office visit, urgent care, ED, or hospital admission in the last 4 weeks? No   How does patient/caregiver feel medication is working? Good   Financial problems or insurance changes? No   How many doses of your specialty medications were missed in the last 4 weeks? 0   Would patient like to speak to a pharmacist? No   When does the patient need to receive the medication? 08/12/23   Refill Delivery Questions    How will the patient receive the medication? MEDRx   When does the patient need to receive the medication? 08/12/23   Shipping Address Home   Address in [facility] confirmed and updated if neccessary? Yes   Expected Copay ($) 0   Is the patient able to afford the medication copay? Yes   Payment Method zero copay   Days supply of Refill 30   Supplies needed? No supplies needed   Refill activity completed? Yes   Refill activity plan Refill scheduled   Shipment/Pickup Date: 08/08/23            Current Outpatient Medications   Medication Sig    albuterol (PROVENTIL/VENTOLIN HFA) 90 mcg/actuation inhaler Inhale 1-2 puffs into the lungs every 6 (six) hours as needed for Wheezing. Rescue (Patient not taking: Reported on 7/7/2023)    atenoloL (TENORMIN) 50 MG tablet Take 1 tablet (50 mg total) by mouth once daily.    cetirizine (ZYRTEC) 10 MG tablet Take 10 mg by mouth once daily.     ergocalciferol (ERGOCALCIFEROL) 50,000 unit Cap Take 1 capsule (50,000 Units total) by mouth every Monday.    fluticasone propionate (FLONASE) 50 mcg/actuation nasal spray 2 sprays (100 mcg total) by Each Nostril route once daily.    furosemide (LASIX) 20 MG tablet Take 1 tablet (20 mg total) by mouth once daily.    HYDROcodone-acetaminophen (NORCO) 7.5-325 mg per tablet Take 1 tablet by mouth every 8 (eight) hours as needed for Pain.    magnesium oxide (MAG-OX) 400 mg (241.3 mg magnesium) tablet Take 1 tablet (400 mg total) by mouth once daily.    montelukast (SINGULAIR) 10 mg tablet TAKE 1 TABLET EVERY EVENING    omeprazole (PRILOSEC) 40 MG capsule Take 1 capsule (40 mg total) by mouth once daily.    potassium chloride (KLOR-CON 10) 10 MEQ TbSR Take 1 tablet (10 mEq total) by mouth once daily.    pravastatin (PRAVACHOL) 40 MG tablet Take 1 tablet (40 mg total) by mouth every evening.    predniSONE (DELTASONE) 10 MG tablet Take 3 daily for 3 days, then 2 daily for three days, then 1 daily for three days.    sildenafil (REVATIO) 20 mg Tab Take 1 tablet (20 mg total) by mouth 3 (three) times daily.    sucralfate (CARAFATE) 1 gram tablet Take 1 g by mouth every evening.    tiZANidine (ZANAFLEX) 4 MG tablet Take 1 tablet (4 mg total) by mouth nightly as needed (muscle spasms/ pain).    triamcinolone acetonide 0.1% (KENALOG) 0.1 % cream Apply topically 2 (two) times daily.   Last reviewed on 7/28/2023 10:20 AM by Christina Rocha PA-C    Review of patient's allergies indicates:   Allergen Reactions    Thiazides Other (See Comments)     Elevates calcium    Lisinopril Other (See Comments)     Other reaction(s): Cough      Cyclobenzaprine Rash    Losartan Nausea Only     Other reaction(s): Unknown      Last reviewed on  7/28/2023 10:20 AM by Christina Rocha      Tasks added this encounter   No tasks added.   Tasks due within next 3 months   8/2/2023 - Refill Coordination Outreach (1 time occurrence)     Eduarda Carreno  [facility]  [facility]  [facility] [address]  Phone: [phone]  Fax: [phone]

## 2023-09-12 DIAGNOSIS — K21.9 GASTROESOPHAGEAL REFLUX DISEASE, UNSPECIFIED WHETHER ESOPHAGITIS PRESENT: ICD-10-CM

## 2023-09-12 RX ORDER — OMEPRAZOLE 40 MG/1
40 CAPSULE, DELAYED RELEASE ORAL DAILY
Qty: 90 CAPSULE | Refills: 3 | Status: SHIPPED | OUTPATIENT
Start: 2023-09-12

## 2023-09-28 DIAGNOSIS — J30.2 SEASONAL ALLERGIES: ICD-10-CM

## 2023-09-28 RX ORDER — FLUTICASONE PROPIONATE 50 MCG
2 SPRAY, SUSPENSION (ML) NASAL
Qty: 48 G | Refills: 11 | Status: SHIPPED | OUTPATIENT
Start: 2023-09-28

## 2023-10-25 ENCOUNTER — OFFICE VISIT (OUTPATIENT)
Dept: CARDIOLOGY | Facility: CLINIC | Age: 73
End: 2023-10-25
Payer: MEDICARE

## 2023-10-25 VITALS
HEART RATE: 71 BPM | HEIGHT: 66 IN | BODY MASS INDEX: 28.27 KG/M2 | WEIGHT: 175.94 LBS | SYSTOLIC BLOOD PRESSURE: 135 MMHG | DIASTOLIC BLOOD PRESSURE: 67 MMHG

## 2023-10-25 DIAGNOSIS — I10 PRIMARY HYPERTENSION: Chronic | ICD-10-CM

## 2023-10-25 DIAGNOSIS — G47.33 OSA ON CPAP: Chronic | ICD-10-CM

## 2023-10-25 DIAGNOSIS — I25.118 CORONARY ARTERY DISEASE OF NATIVE ARTERY OF NATIVE HEART WITH STABLE ANGINA PECTORIS: Primary | Chronic | ICD-10-CM

## 2023-10-25 DIAGNOSIS — I08.0 MITRAL REGURGITATION AND AORTIC STENOSIS: Chronic | ICD-10-CM

## 2023-10-25 DIAGNOSIS — I27.20 PULMONARY HYPERTENSION: Chronic | ICD-10-CM

## 2023-10-25 DIAGNOSIS — E66.3 OVERWEIGHT (BMI 25.0-29.9): Chronic | ICD-10-CM

## 2023-10-25 DIAGNOSIS — E78.00 HYPERCHOLESTEROLEMIA: Chronic | ICD-10-CM

## 2023-10-25 PROCEDURE — 3078F PR MOST RECENT DIASTOLIC BLOOD PRESSURE < 80 MM HG: ICD-10-PCS | Mod: CPTII,S$GLB,, | Performed by: INTERNAL MEDICINE

## 2023-10-25 PROCEDURE — 1159F PR MEDICATION LIST DOCUMENTED IN MEDICAL RECORD: ICD-10-PCS | Mod: CPTII,S$GLB,, | Performed by: INTERNAL MEDICINE

## 2023-10-25 PROCEDURE — 3288F FALL RISK ASSESSMENT DOCD: CPT | Mod: CPTII,S$GLB,, | Performed by: INTERNAL MEDICINE

## 2023-10-25 PROCEDURE — 3008F BODY MASS INDEX DOCD: CPT | Mod: CPTII,S$GLB,, | Performed by: INTERNAL MEDICINE

## 2023-10-25 PROCEDURE — 3288F PR FALLS RISK ASSESSMENT DOCUMENTED: ICD-10-PCS | Mod: CPTII,S$GLB,, | Performed by: INTERNAL MEDICINE

## 2023-10-25 PROCEDURE — 3075F PR MOST RECENT SYSTOLIC BLOOD PRESS GE 130-139MM HG: ICD-10-PCS | Mod: CPTII,S$GLB,, | Performed by: INTERNAL MEDICINE

## 2023-10-25 PROCEDURE — 1126F PR PAIN SEVERITY QUANTIFIED, NO PAIN PRESENT: ICD-10-PCS | Mod: CPTII,S$GLB,, | Performed by: INTERNAL MEDICINE

## 2023-10-25 PROCEDURE — 3078F DIAST BP <80 MM HG: CPT | Mod: CPTII,S$GLB,, | Performed by: INTERNAL MEDICINE

## 2023-10-25 PROCEDURE — 99214 PR OFFICE/OUTPT VISIT, EST, LEVL IV, 30-39 MIN: ICD-10-PCS | Mod: S$GLB,,, | Performed by: INTERNAL MEDICINE

## 2023-10-25 PROCEDURE — 3075F SYST BP GE 130 - 139MM HG: CPT | Mod: CPTII,S$GLB,, | Performed by: INTERNAL MEDICINE

## 2023-10-25 PROCEDURE — 1101F PR PT FALLS ASSESS DOC 0-1 FALLS W/OUT INJ PAST YR: ICD-10-PCS | Mod: CPTII,S$GLB,, | Performed by: INTERNAL MEDICINE

## 2023-10-25 PROCEDURE — 1101F PT FALLS ASSESS-DOCD LE1/YR: CPT | Mod: CPTII,S$GLB,, | Performed by: INTERNAL MEDICINE

## 2023-10-25 PROCEDURE — 3008F PR BODY MASS INDEX (BMI) DOCUMENTED: ICD-10-PCS | Mod: CPTII,S$GLB,, | Performed by: INTERNAL MEDICINE

## 2023-10-25 PROCEDURE — 1126F AMNT PAIN NOTED NONE PRSNT: CPT | Mod: CPTII,S$GLB,, | Performed by: INTERNAL MEDICINE

## 2023-10-25 PROCEDURE — 1159F MED LIST DOCD IN RCRD: CPT | Mod: CPTII,S$GLB,, | Performed by: INTERNAL MEDICINE

## 2023-10-25 PROCEDURE — 99214 OFFICE O/P EST MOD 30 MIN: CPT | Mod: S$GLB,,, | Performed by: INTERNAL MEDICINE

## 2023-10-25 RX ORDER — PRAVASTATIN SODIUM 40 MG/1
40 TABLET ORAL NIGHTLY
Qty: 90 TABLET | Refills: 1 | Status: SHIPPED | OUTPATIENT
Start: 2023-10-25 | End: 2024-02-14 | Stop reason: SDUPTHER

## 2023-10-25 NOTE — PROGRESS NOTES
Subjective:    Patient ID:  Aleida Bliss is a 73 y.o. female who presents for Coronary artery disease of native artery of native heart wi and Valvular Heart Disease        HPI    DISCUSSED LABS AND GOALS CMP OK, FEELS BETTER WITH CPAP, ABLE TO WALK MORE LESS SOB, NO TIA TYPE SYMPTOMS NO NEAR-SYNCOPE, SEE ROS  Past Medical History:   Diagnosis Date    Arthritis     Coronary artery disease     GERD (gastroesophageal reflux disease)     Hypertension     SOB (shortness of breath)      Past Surgical History:   Procedure Laterality Date    CATARACT EXTRACTION EXTRACAPSULAR W/ INTRAOCULAR LENS IMPLANTATION      CATHETERIZATION OF BOTH LEFT AND RIGHT HEART N/A 02/15/2022    Procedure: CATHETERIZATION, HEART, BOTH LEFT AND RIGHT;  Surgeon: Cuong Hinkle MD;  Location: [facility] CATH;  Service: Cardiology;  Laterality: N/A;    CHOLECYSTECTOMY      COLONOSCOPY      COLONOSCOPY N/A 3/15/2022    Procedure: COLONOSCOPY;  Surgeon: Varun Toro MD;  Location: [facility] ENDO;  Service: Endoscopy;  Laterality: N/A;    HYSTERECTOMY       Family History   Problem Relation Age of Onset    Heart disease Mother     Cancer Father         prostate     Social History     Socioeconomic History    Marital status:    Tobacco Use    Smoking status: Never    Smokeless tobacco: Never   Substance and Sexual Activity    Alcohol use: No    Drug use: No    Sexual activity: Not Currently       Review of patient's allergies indicates:   Allergen Reactions    Thiazides Other (See Comments)     Elevates calcium    Lisinopril Other (See Comments)     Other reaction(s): Cough      Cyclobenzaprine Rash    Losartan Nausea Only     Other reaction(s): Unknown         Current Outpatient Medications:     albuterol (PROVENTIL/VENTOLIN HFA) 90 mcg/actuation inhaler, Inhale 1-2 puffs into the lungs every 6 (six) hours as needed for Wheezing. Rescue, Disp: 8 g, Rfl: 0    atenoloL (TENORMIN) 50 MG tablet, Take 1 tablet (50 mg total) by mouth once daily., Disp:  90 tablet, Rfl: 3    cetirizine (ZYRTEC) 10 MG tablet, Take 10 mg by mouth once daily., Disp: , Rfl:     ergocalciferol (ERGOCALCIFEROL) 50,000 unit Cap, Take 1 capsule (50,000 Units total) by mouth every Monday., Disp: 12 capsule, Rfl: 3    fluticasone propionate (FLONASE) 50 mcg/actuation nasal spray, USE 2 SPRAYS IN EACH NOSTRIL ONE TIME DAILY, Disp: 48 g, Rfl: 11    furosemide (LASIX) 20 MG tablet, Take 1 tablet (20 mg total) by mouth once daily., Disp: 30 tablet, Rfl: 2    HYDROcodone-acetaminophen (NORCO) 7.5-325 mg per tablet, Take 1 tablet by mouth every 8 (eight) hours as needed for Pain., Disp: 20 tablet, Rfl: 0    magnesium oxide (MAG-OX) 400 mg (241.3 mg magnesium) tablet, Take 1 tablet (400 mg total) by mouth once daily., Disp: 90 tablet, Rfl: 3    montelukast (SINGULAIR) 10 mg tablet, TAKE 1 TABLET EVERY EVENING, Disp: 90 tablet, Rfl: 3    omeprazole (PRILOSEC) 40 MG capsule, TAKE 1 CAPSULE (40 MG TOTAL) BY MOUTH ONCE DAILY., Disp: 90 capsule, Rfl: 3    potassium chloride (KLOR-CON 10) 10 MEQ TbSR, Take 1 tablet (10 mEq total) by mouth once daily., Disp: 90 tablet, Rfl: 1    predniSONE (DELTASONE) 10 MG tablet, Take 3 daily for 3 days, then 2 daily for three days, then 1 daily for three days. (Patient taking differently: Take 10 mg by mouth once daily. Take 3 daily for 3 days, then 2 daily for three days, then 1 daily for three days.), Disp: 18 tablet, Rfl: 0    sildenafil (REVATIO) 20 mg Tab, Take 1 tablet (20 mg total) by mouth 3 (three) times daily., Disp: 270 tablet, Rfl: 3    sucralfate (CARAFATE) 1 gram tablet, Take 1 g by mouth every evening., Disp: , Rfl:     tiZANidine (ZANAFLEX) 4 MG tablet, Take 1 tablet (4 mg total) by mouth nightly as needed (muscle spasms/ pain)., Disp: 40 tablet, Rfl: 0    triamcinolone acetonide 0.1% (KENALOG) 0.1 % cream, Apply topically 2 (two) times daily., Disp: 80 g, Rfl: 3    pravastatin (PRAVACHOL) 40 MG tablet, Take 1 tablet (40 mg total) by mouth every  "evening., Disp: 90 tablet, Rfl: 1    Review of Systems   Constitutional: Negative for chills, decreased appetite, diaphoresis, fever, malaise/fatigue and night sweats.   HENT:  Negative for congestion and nosebleeds.    Eyes:  Negative for blurred vision and visual disturbance.   Cardiovascular:  Positive for dyspnea on exertion (MILD). Negative for chest pain, claudication, cyanosis, irregular heartbeat, leg swelling, near-syncope, orthopnea, palpitations, paroxysmal nocturnal dyspnea and syncope.   Respiratory:  Negative for cough (DRY), hemoptysis, shortness of breath, snoring and wheezing.    Hematologic/Lymphatic: Negative for adenopathy. Does not bruise/bleed easily.   Skin:  Negative for color change and itching.   Musculoskeletal:  Positive for arthritis, back pain (MILD) and neck pain. Negative for falls.   Gastrointestinal:  Negative for abdominal pain, change in bowel habit, jaundice, melena and nausea.   Genitourinary:  Negative for dysuria and flank pain.   Neurological:  Negative for brief paralysis, focal weakness, headaches, light-headedness, loss of balance and weakness. Dizziness: OCC.  Psychiatric/Behavioral:  Negative for altered mental status and depression.         Objective:      Vitals:    10/25/23 0930   BP: 135/67   Pulse: 71   Weight: 79.8 kg (175 lb 14.8 oz)   Height: 5' 6" (1.676 m)   PainSc: 0-No pain     Body mass index is 28.4 kg/m².    Physical Exam  Constitutional:       Appearance: Normal appearance. She is overweight.   HENT:      Head: Normocephalic and atraumatic.   Eyes:      Extraocular Movements: Extraocular movements intact.      Conjunctiva/sclera: Conjunctivae normal.      Pupils: Pupils are equal, round, and reactive to light.   Neck:      Thyroid: No thyromegaly.      Vascular: Normal carotid pulses. No carotid bruit, hepatojugular reflux or JVD.      Trachea: Trachea normal. No tracheal deviation.   Cardiovascular:      Rate and Rhythm: Normal rate and regular rhythm. " No extrasystoles are present.     Pulses:           Carotid pulses are 2+ on the right side and 2+ on the left side.       Radial pulses are 2+ on the right side and 2+ on the left side.        Posterior tibial pulses are 2+ on the right side and 2+ on the left side.      Heart sounds: Murmur heard.      Systolic murmur is present with a grade of 2/6 at the lower left sternal border.      No friction rub. No gallop. No S4 sounds.   Pulmonary:      Effort: Pulmonary effort is normal. No respiratory distress.      Breath sounds: Normal breath sounds. No rales.   Abdominal:      General: Bowel sounds are normal.      Palpations: Abdomen is soft.      Tenderness: There is no abdominal tenderness.   Musculoskeletal:         General: Normal range of motion.      Cervical back: Neck supple. No edema or erythema.      Right lower leg: No edema.      Left lower leg: No edema.   Skin:     General: Skin is warm and dry.      Capillary Refill: Capillary refill takes less than 2 seconds.   Neurological:      General: No focal deficit present.      Mental Status: She is alert and oriented to person, place, and time.      Cranial Nerves: Cranial nerves 2-12 are intact.   Psychiatric:         Mood and Affect: Mood normal.         Speech: Speech normal.         Behavior: Behavior normal.                 ..    Chemistry        Component Value Date/Time     06/21/2023 1007    K 3.8 06/21/2023 1007     06/21/2023 1007    CO2 25 06/21/2023 1007    BUN 9 06/21/2023 1007    CREATININE 0.9 06/21/2023 1007     06/21/2023 1007        Component Value Date/Time    CALCIUM 10.4 06/21/2023 1007    ALKPHOS 89 06/21/2023 1007    AST 29 06/21/2023 1007    ALT 20 06/21/2023 1007    BILITOT 0.6 06/21/2023 1007    ESTGFRAFRICA >60 02/15/2022 0818    EGFRNONAA >60 02/15/2022 0818            ..  Lab Results   Component Value Date    CHOL 170 08/12/2022    CHOL 166 12/15/2021    CHOL 127 09/22/2021     Lab Results   Component Value  Date    HDL 52 08/12/2022    HDL 51 12/15/2021    HDL 45 09/22/2021     Lab Results   Component Value Date    LDLCALC 104.2 08/12/2022    LDLCALC 101 (H) 12/15/2021    LDLCALC 68 09/22/2021     Lab Results   Component Value Date    TRIG 69 08/12/2022    TRIG 76 12/15/2021    TRIG 65 09/22/2021     Lab Results   Component Value Date    CHOLHDL 30.6 08/12/2022     ..  Lab Results   Component Value Date    WBC 5.23 06/21/2023    HGB 12.2 06/21/2023    HCT 39.2 06/21/2023    MCV 93 06/21/2023     06/21/2023       Test(s) Reviewed  I have reviewed the following in detail:  [] Stress test   [] Angiography   [] Echocardiogram   [x] Labs   [] Other:       Assessment:         ICD-10-CM ICD-9-CM   1. Coronary artery disease of native artery of native heart with stable angina pectoris  I25.118 414.01     413.9   2. Mitral regurgitation and aortic stenosis  I08.0 396.2   3. Pulmonary hypertension  I27.20 416.8   4. Primary hypertension  I10 401.9   5. Overweight (BMI 25.0-29.9)  E66.3 278.02   6. DOMINIC on CPAP  G47.33 327.23   7. Hypercholesterolemia  E78.00 272.0     Problem List Items Addressed This Visit          Cardiac/Vascular    Primary hypertension    Hypercholesterolemia    Relevant Orders    Lipid Panel    Coronary artery disease of native artery of native heart with stable angina pectoris - Primary    Relevant Orders    Echo    Lipid Panel    Mitral regurgitation and aortic stenosis    Relevant Orders    Echo    Pulmonary hypertension    Relevant Orders    Echo       Endocrine    Overweight (BMI 25.0-29.9)       Other    DOMINIC on CPAP        Plan:         WATCH BP,ALL CV CLINICALLY STABLE, CLASS 1 ANGINA, NO OVERT HF, NO TIA, NO CLINICAL ARRHYTHMIA,CONTINUE CURRENT MEDS, EDUCATION, DIET, EXERCISE AS MUCH AS POSSIBLE, WEIGHT LOSS, RETURN TO CLINIC IN  6 MO WITH ECHO AND LIPIDS, DISCUSSED PLAN WITH THE PATIENT AND HER   Coronary artery disease of native artery of native heart with stable angina pectoris  -      Echo; Future; Expected date: 04/25/2024  -     Lipid Panel; Future; Expected date: 04/25/2024    Mitral regurgitation and aortic stenosis  -     Echo; Future; Expected date: 04/25/2024    Pulmonary hypertension  -     Echo; Future; Expected date: 04/25/2024    Primary hypertension    Overweight (BMI 25.0-29.9)    DOMINIC on CPAP    Hypercholesterolemia  -     Lipid Panel; Future; Expected date: 04/25/2024    Other orders  -     pravastatin (PRAVACHOL) 40 MG tablet; Take 1 tablet (40 mg total) by mouth every evening.  Dispense: 90 tablet; Refill: 1    RTC Low level/low impact aerobic exercise 5x's/wk. Heart healthy diet and risk factor modification.    See labs and med orders.    Aerobic exercise 5x's/wk. Heart healthy diet and risk factor modification.    See labs and med orders.

## 2024-01-10 DIAGNOSIS — E83.42 HYPOMAGNESEMIA: ICD-10-CM

## 2024-01-11 RX ORDER — LANOLIN ALCOHOL/MO/W.PET/CERES
1 CREAM (GRAM) TOPICAL
Qty: 90 TABLET | Refills: 3 | Status: SHIPPED | OUTPATIENT
Start: 2024-01-11

## 2024-02-08 ENCOUNTER — OFFICE VISIT (OUTPATIENT)
Dept: PRIMARY CARE CLINIC | Facility: CLINIC | Age: 74
End: 2024-02-08
Payer: MEDICARE

## 2024-02-08 VITALS
BODY MASS INDEX: 28.54 KG/M2 | SYSTOLIC BLOOD PRESSURE: 136 MMHG | DIASTOLIC BLOOD PRESSURE: 88 MMHG | HEART RATE: 64 BPM | OXYGEN SATURATION: 97 % | RESPIRATION RATE: 18 BRPM | TEMPERATURE: 98 F | WEIGHT: 176.81 LBS

## 2024-02-08 DIAGNOSIS — I27.20 PULMONARY HYPERTENSION: ICD-10-CM

## 2024-02-08 DIAGNOSIS — I77.9 MILD CAROTID ARTERY DISEASE: ICD-10-CM

## 2024-02-08 DIAGNOSIS — J32.9 SINUSITIS, UNSPECIFIED CHRONICITY, UNSPECIFIED LOCATION: ICD-10-CM

## 2024-02-08 DIAGNOSIS — I25.118 CORONARY ARTERY DISEASE OF NATIVE ARTERY OF NATIVE HEART WITH STABLE ANGINA PECTORIS: ICD-10-CM

## 2024-02-08 DIAGNOSIS — E21.3 HYPERPARATHYROIDISM: ICD-10-CM

## 2024-02-08 DIAGNOSIS — R07.9 CHEST PAIN WITH HIGH RISK OF ACUTE CORONARY SYNDROME: Primary | ICD-10-CM

## 2024-02-08 PROCEDURE — 1101F PT FALLS ASSESS-DOCD LE1/YR: CPT | Mod: CPTII,S$GLB,, | Performed by: PHYSICIAN ASSISTANT

## 2024-02-08 PROCEDURE — 3075F SYST BP GE 130 - 139MM HG: CPT | Mod: CPTII,S$GLB,, | Performed by: PHYSICIAN ASSISTANT

## 2024-02-08 PROCEDURE — 3079F DIAST BP 80-89 MM HG: CPT | Mod: CPTII,S$GLB,, | Performed by: PHYSICIAN ASSISTANT

## 2024-02-08 PROCEDURE — 1159F MED LIST DOCD IN RCRD: CPT | Mod: CPTII,S$GLB,, | Performed by: PHYSICIAN ASSISTANT

## 2024-02-08 PROCEDURE — 3288F FALL RISK ASSESSMENT DOCD: CPT | Mod: CPTII,S$GLB,, | Performed by: PHYSICIAN ASSISTANT

## 2024-02-08 PROCEDURE — 1126F AMNT PAIN NOTED NONE PRSNT: CPT | Mod: CPTII,S$GLB,, | Performed by: PHYSICIAN ASSISTANT

## 2024-02-08 PROCEDURE — 99214 OFFICE O/P EST MOD 30 MIN: CPT | Mod: S$GLB,,, | Performed by: PHYSICIAN ASSISTANT

## 2024-02-08 PROCEDURE — 3008F BODY MASS INDEX DOCD: CPT | Mod: CPTII,S$GLB,, | Performed by: PHYSICIAN ASSISTANT

## 2024-02-08 PROCEDURE — 1160F RVW MEDS BY RX/DR IN RCRD: CPT | Mod: CPTII,S$GLB,, | Performed by: PHYSICIAN ASSISTANT

## 2024-02-08 RX ORDER — DOXYCYCLINE HYCLATE 100 MG
100 TABLET ORAL 2 TIMES DAILY
Qty: 20 TABLET | Refills: 0 | Status: SHIPPED | OUTPATIENT
Start: 2024-02-08 | End: 2024-04-12 | Stop reason: CLARIF

## 2024-02-08 RX ORDER — BENZONATATE 200 MG/1
200 CAPSULE ORAL 3 TIMES DAILY PRN
Qty: 30 CAPSULE | Refills: 0 | Status: SHIPPED | OUTPATIENT
Start: 2024-02-08 | End: 2024-02-19

## 2024-02-08 RX ORDER — LEVOCETIRIZINE DIHYDROCHLORIDE 5 MG/1
5 TABLET, FILM COATED ORAL NIGHTLY
Qty: 30 TABLET | Refills: 0 | Status: SHIPPED | OUTPATIENT
Start: 2024-02-08 | End: 2024-03-04

## 2024-02-08 NOTE — PROGRESS NOTES
Subjective     Patient ID: Aleida Bliss is a 73 y.o. female.    Chief Complaint: Follow-up and Cough    Patient is a 72 yo female coming in for a follow up.     Patient Active Problem List:     Mitral valve disorder     Chest pain with high risk of acute coronary syndrome     Bradycardia     Severe pulmonary hypertension     Severe mitral regurgitation     Elevated left ventricular end-diastolic pressure (LVEDP)     H/O: rheumatic fever     Overweight (BMI 25.0-29.9)     Primary hypertension     Hypercholesterolemia     Nonspecific abnormal electrocardiogram (ECG) (EKG)     Imbalance     Cervicalgia     Abnormal nuclear stress test     Moderate to severe mitral regurgitation     Coronary artery disease of native artery of native heart with stable angina pectoris     Gastroesophageal reflux disease     Vitamin D deficiency disease     Seasonal allergies     Mitral regurgitation and aortic stenosis     Pulmonary hypertension     Carotid artery plaque, left     Hypomagnesemia     Mild carotid artery disease     Multiple thyroid nodules     Hypercalcemia     Age-related osteoporosis without current pathological fracture     Encounter for pre-operative cardiovascular clearance     PVC (premature ventricular contraction)     DOMINIC on CPAP     Hyperparathyroidism    Past Medical History:  No date: Arthritis  No date: Coronary artery disease  No date: GERD (gastroesophageal reflux disease)  No date: Hypertension  No date: SOB (shortness of breath)      Follow-up  Associated symptoms include chest pain, congestion, coughing, fatigue, a sore throat and swollen glands. Pertinent negatives include no chills or fever.   Cough  Associated symptoms include chest pain, postnasal drip and a sore throat. Pertinent negatives include no chills, fever, rhinorrhea or shortness of breath.   Sinus Problem  This is a new problem. The current episode started in the past 7 days. The problem has been gradually worsening since onset. There has  been no fever. The pain is mild. Associated symptoms include congestion, coughing, sinus pressure, a sore throat and swollen glands. Pertinent negatives include no chills, shortness of breath or sneezing. Past treatments include nothing.   Chest Pain   This is a new problem. The current episode started yesterday. The onset quality is undetermined. The problem has been waxing and waning. The pain is present in the lateral region. The pain is mild. The quality of the pain is described as pressure. The pain does not radiate. Associated symptoms include a cough and exertional chest pressure. Pertinent negatives include no fever or shortness of breath.     Review of Systems   Constitutional:  Positive for fatigue. Negative for activity change, chills and fever.   HENT:  Positive for nasal congestion, postnasal drip, sinus pressure/congestion, sore throat and trouble swallowing. Negative for rhinorrhea and sneezing.    Respiratory:  Positive for cough. Negative for shortness of breath.    Cardiovascular:  Positive for chest pain.          Objective     Physical Exam  Vitals reviewed.   Constitutional:       General: She is not in acute distress.     Appearance: She is ill-appearing. She is not toxic-appearing.   HENT:      Right Ear: Tympanic membrane, ear canal and external ear normal. There is no impacted cerumen.      Left Ear: There is impacted cerumen.      Nose: Congestion present.   Eyes:      Conjunctiva/sclera: Conjunctivae normal.   Neck:      Vascular: No carotid bruit.   Cardiovascular:      Rate and Rhythm: Normal rate and regular rhythm.      Pulses: Normal pulses.      Heart sounds: Normal heart sounds. No murmur heard.     No friction rub. No gallop.   Pulmonary:      Effort: Pulmonary effort is normal. No respiratory distress.      Breath sounds: Normal breath sounds. No stridor. No wheezing, rhonchi or rales.   Chest:      Chest wall: No tenderness.   Abdominal:      Palpations: Abdomen is soft.       Tenderness: There is no abdominal tenderness.   Musculoskeletal:      Cervical back: No rigidity or tenderness.   Lymphadenopathy:      Cervical: Cervical adenopathy present.   Neurological:      Mental Status: She is alert.            Assessment and Plan     1. Chest pain with high risk of acute coronary syndrome  -     Ambulatory referral/consult to Cardiology; Future; Expected date: 02/15/2024    2. Hyperparathyroidism  Was scheduled to see surgery for removal. Patient was ask to get a pulmonary clearance and Cardiac clearance.     3. Pulmonary hypertension  The current medical regimen is effective;  continue present plan and medications.     4. Coronary artery disease of native artery of native heart with stable angina pectoris  Cardiac referral    5. Mild carotid artery disease  The current medical regimen is effective;  continue present plan and medications.     6. Sinusitis, unspecified chronicity, unspecified location  -     doxycycline (VIBRA-TABS) 100 MG tablet; Take 1 tablet (100 mg total) by mouth 2 (two) times daily.  Dispense: 20 tablet; Refill: 0  -     levocetirizine (XYZAL) 5 MG tablet; Take 1 tablet (5 mg total) by mouth every evening.  Dispense: 30 tablet; Refill: 0  -     benzonatate (TESSALON) 200 MG capsule; Take 1 capsule (200 mg total) by mouth 3 (three) times daily as needed for Cough.  Dispense: 30 capsule; Refill: 0                 I spent 30 minutes on this encounter, time includes face-to-face, chart review, documentation, test review and orders.

## 2024-02-13 NOTE — PROGRESS NOTES
Subjective:    Patient ID:  Aleida Bliss is a 73 y.o. female who presents for Coronary artery disease of native artery of native heart wi        HPI  REQUESTED OFFICE VISIT HAS BEEN MORE SHORT OF BREATH, WORSENING, SYMPTOMS, NO TIA TYPE SYMPTOMS NO NEAR-SYNCOPE ALSO PATIENT SAID THAT PCP REPORTED IT LARGE THYROID, SEE ROS    Past Medical History:   Diagnosis Date    Arthritis     Coronary artery disease     GERD (gastroesophageal reflux disease)     Hypertension     SOB (shortness of breath)      Past Surgical History:   Procedure Laterality Date    CATARACT EXTRACTION EXTRACAPSULAR W/ INTRAOCULAR LENS IMPLANTATION      CATHETERIZATION OF BOTH LEFT AND RIGHT HEART N/A 02/15/2022    Procedure: CATHETERIZATION, HEART, BOTH LEFT AND RIGHT;  Surgeon: Cuong Hinkle MD;  Location: [facility] CATH;  Service: Cardiology;  Laterality: N/A;    CHOLECYSTECTOMY      COLONOSCOPY      COLONOSCOPY N/A 3/15/2022    Procedure: COLONOSCOPY;  Surgeon: Varun Toro MD;  Location: [facility] ENDO;  Service: Endoscopy;  Laterality: N/A;    HYSTERECTOMY       Family History   Problem Relation Age of Onset    Heart disease Mother     Cancer Father         prostate     Social History     Socioeconomic History    Marital status:    Tobacco Use    Smoking status: Never    Smokeless tobacco: Never   Substance and Sexual Activity    Alcohol use: No    Drug use: No    Sexual activity: Not Currently       Review of patient's allergies indicates:   Allergen Reactions    Thiazides Other (See Comments)     Elevates calcium    Lisinopril Other (See Comments)     Other reaction(s): Cough      Cyclobenzaprine Rash    Losartan Nausea Only     Other reaction(s): Unknown         Current Outpatient Medications:     albuterol (PROVENTIL/VENTOLIN HFA) 90 mcg/actuation inhaler, Inhale 1-2 puffs into the lungs every 6 (six) hours as needed for Wheezing. Rescue, Disp: 8 g, Rfl: 0    atenoloL (TENORMIN) 50 MG tablet, Take 1 tablet (50 mg total) by mouth  once daily., Disp: 90 tablet, Rfl: 3    benzonatate (TESSALON) 200 MG capsule, Take 1 capsule (200 mg total) by mouth 3 (three) times daily as needed for Cough., Disp: 30 capsule, Rfl: 0    doxycycline (VIBRA-TABS) 100 MG tablet, Take 1 tablet (100 mg total) by mouth 2 (two) times daily., Disp: 20 tablet, Rfl: 0    ergocalciferol (ERGOCALCIFEROL) 50,000 unit Cap, Take 1 capsule (50,000 Units total) by mouth every Monday., Disp: 12 capsule, Rfl: 3    fluticasone propionate (FLONASE) 50 mcg/actuation nasal spray, USE 2 SPRAYS IN EACH NOSTRIL ONE TIME DAILY, Disp: 48 g, Rfl: 11    furosemide (LASIX) 20 MG tablet, Take 1 tablet (20 mg total) by mouth once daily., Disp: 30 tablet, Rfl: 2    HYDROcodone-acetaminophen (NORCO) 7.5-325 mg per tablet, Take 1 tablet by mouth every 8 (eight) hours as needed for Pain., Disp: 20 tablet, Rfl: 0    levocetirizine (XYZAL) 5 MG tablet, Take 1 tablet (5 mg total) by mouth every evening., Disp: 30 tablet, Rfl: 0    magnesium oxide (MAG-OX) 400 mg (241.3 mg magnesium) tablet, TAKE 1 TABLET EVERY DAY, Disp: 90 tablet, Rfl: 3    montelukast (SINGULAIR) 10 mg tablet, TAKE 1 TABLET EVERY EVENING, Disp: 90 tablet, Rfl: 3    omeprazole (PRILOSEC) 40 MG capsule, TAKE 1 CAPSULE (40 MG TOTAL) BY MOUTH ONCE DAILY., Disp: 90 capsule, Rfl: 3    potassium chloride (KLOR-CON 10) 10 MEQ TbSR, Take 1 tablet (10 mEq total) by mouth once daily., Disp: 90 tablet, Rfl: 1    sildenafil (REVATIO) 20 mg Tab, Take 1 tablet (20 mg total) by mouth 3 (three) times daily., Disp: 270 tablet, Rfl: 3    sucralfate (CARAFATE) 1 gram tablet, Take 1 g by mouth every evening., Disp: , Rfl:     tiZANidine (ZANAFLEX) 4 MG tablet, Take 1 tablet (4 mg total) by mouth nightly as needed (muscle spasms/ pain)., Disp: 40 tablet, Rfl: 0    triamcinolone acetonide 0.1% (KENALOG) 0.1 % cream, Apply topically 2 (two) times daily., Disp: 80 g, Rfl: 3    pravastatin (PRAVACHOL) 40 MG tablet, Take 1 tablet (40 mg total) by mouth every  evening., Disp: 90 tablet, Rfl: 1    Review of Systems   Constitutional: Negative for chills, decreased appetite, diaphoresis, fever, malaise/fatigue and night sweats.   HENT:  Negative for congestion and nosebleeds.    Eyes:  Negative for blurred vision and visual disturbance.   Cardiovascular:  Positive for dyspnea on exertion. Negative for chest pain, claudication, cyanosis, irregular heartbeat, leg swelling, near-syncope, orthopnea, palpitations, paroxysmal nocturnal dyspnea and syncope.   Respiratory:  Positive for cough (DRY) and shortness of breath. Negative for hemoptysis and wheezing.    Hematologic/Lymphatic: Negative for adenopathy. Does not bruise/bleed easily.   Skin:  Negative for color change and itching.   Musculoskeletal:  Positive for arthritis and back pain (MILD). Negative for falls.   Gastrointestinal:  Negative for abdominal pain, change in bowel habit, jaundice, melena and nausea.   Genitourinary:  Negative for dysuria and flank pain.   Neurological:  Negative for brief paralysis, focal weakness, headaches, light-headedness, loss of balance and weakness. Dizziness: OCC.  Psychiatric/Behavioral:  Negative for altered mental status and depression.         Objective:      Vitals:    02/14/24 0847 02/14/24 0904   BP: (!) 150/70 136/68   Pulse: 73    Resp: 18    Weight: 80.1 kg (176 lb 9.4 oz)    PainSc: 0-No pain      Body mass index is 28.5 kg/m².    Physical Exam  Constitutional:       Appearance: Normal appearance. She is overweight.   HENT:      Head: Normocephalic and atraumatic.   Eyes:      General: No scleral icterus.     Extraocular Movements: Extraocular movements intact.      Pupils: Pupils are equal, round, and reactive to light.   Neck:      Thyroid: No thyromegaly.      Vascular: Normal carotid pulses. No carotid bruit, hepatojugular reflux or JVD.      Trachea: Trachea normal. No tracheal deviation.   Cardiovascular:      Rate and Rhythm: Normal rate and regular rhythm. No  extrasystoles are present.     Pulses:           Carotid pulses are 2+ on the right side and 2+ on the left side.       Radial pulses are 2+ on the right side and 2+ on the left side.        Posterior tibial pulses are 2+ on the right side and 2+ on the left side.      Heart sounds: Murmur heard.      Systolic murmur is present with a grade of 2/6 at the lower left sternal border.      No friction rub. No gallop. No S4 sounds.   Pulmonary:      Effort: Pulmonary effort is normal.      Breath sounds: Normal breath sounds and air entry. No rales.   Abdominal:      General: Bowel sounds are normal.      Palpations: Abdomen is soft.      Tenderness: There is no abdominal tenderness.   Musculoskeletal:         General: Normal range of motion.      Cervical back: Neck supple. No edema or erythema.      Right lower leg: No edema.      Left lower leg: No edema.   Skin:     General: Skin is warm and dry.      Capillary Refill: Capillary refill takes less than 2 seconds.   Neurological:      General: No focal deficit present.      Mental Status: She is alert and oriented to person, place, and time.      Cranial Nerves: No cranial nerve deficit.   Psychiatric:         Mood and Affect: Mood normal.         Speech: Speech normal.         Behavior: Behavior normal.                 ..    Chemistry        Component Value Date/Time     06/21/2023 1007    K 3.8 06/21/2023 1007     06/21/2023 1007    CO2 25 06/21/2023 1007    BUN 9 06/21/2023 1007    CREATININE 0.9 06/21/2023 1007     06/21/2023 1007        Component Value Date/Time    CALCIUM 10.4 06/21/2023 1007    ALKPHOS 89 06/21/2023 1007    AST 29 06/21/2023 1007    ALT 20 06/21/2023 1007    BILITOT 0.6 06/21/2023 1007    ESTGFRAFRICA >60 02/15/2022 0818    EGFRNONAA >60 02/15/2022 0818            ..  Lab Results   Component Value Date    CHOL 170 08/12/2022    CHOL 166 12/15/2021    CHOL 127 09/22/2021     Lab Results   Component Value Date    HDL 52  08/12/2022    HDL 51 12/15/2021    HDL 45 09/22/2021     Lab Results   Component Value Date    LDLCALC 104.2 08/12/2022    LDLCALC 101 (H) 12/15/2021    LDLCALC 68 09/22/2021     Lab Results   Component Value Date    TRIG 69 08/12/2022    TRIG 76 12/15/2021    TRIG 65 09/22/2021     Lab Results   Component Value Date    CHOLHDL 30.6 08/12/2022     ..  Lab Results   Component Value Date    WBC 5.23 06/21/2023    HGB 12.2 06/21/2023    HCT 39.2 06/21/2023    MCV 93 06/21/2023     06/21/2023       Test(s) Reviewed  I have reviewed the following in detail:  [] Stress test   [] Angiography   [] Echocardiogram   [] Labs   [] Other:       Assessment:         ICD-10-CM ICD-9-CM   1. SOB (shortness of breath)  R06.02 786.05   2. Severe mitral regurgitation  I34.0 424.0   3. Coronary artery disease of native artery of native heart with stable angina pectoris  I25.118 414.01     413.9   4. Overweight (BMI 25.0-29.9)  E66.3 278.02   5. Pulmonary hypertension  I27.20 416.8     Problem List Items Addressed This Visit          Pulmonary    SOB (shortness of breath) - Primary    Relevant Orders    Echo    Case Request-Cath Lab: CATHETERIZATION, HEART, BOTH LEFT AND RIGHT (Completed)    Vital signs    Cardiac Monitoring - Adult    Basic metabolic panel       Cardiac/Vascular    Severe mitral regurgitation    Relevant Orders    Echo    Case Request-Cath Lab: CATHETERIZATION, HEART, BOTH LEFT AND RIGHT (Completed)    Vital signs    Cardiac Monitoring - Adult    Basic metabolic panel    Coronary artery disease of native artery of native heart with stable angina pectoris    Relevant Orders    Case Request-Cath Lab: CATHETERIZATION, HEART, BOTH LEFT AND RIGHT (Completed)    Vital signs    Cardiac Monitoring - Adult    Basic metabolic panel    Pulmonary hypertension    Relevant Orders    Echo    Case Request-Cath Lab: CATHETERIZATION, HEART, BOTH LEFT AND RIGHT (Completed)    Vital signs    Cardiac Monitoring - Adult    Basic  metabolic panel       Endocrine    Overweight (BMI 25.0-29.9)        Plan:     WORSENING SYMPTOMS HEART FAILURE SYMPTOMS WILL CHECK ECHOCARDIOGRAM REASSESS MITRAL REGURGITATION PA PRESSURE NEED ALSO RIGHT AND LEFT HEART CATHETERIZATION ASSESS CAD MITRAL VALVE REPAIR/REPLACEMENT, DAILY WEIGHT 2 LB PER DAY 5 LB PER WEEK RULE EXPLAINED, NO SIGNIFICANT CLINICAL ARRHYTHMIA, INCREASE ACTIVITY AVOID HEAVY LIFTING, VERY LONG DISCUSSION DISEASE PROCESS OPTIONS ALTERNATIVES RISKS      SOB (shortness of breath)  Comments:  WORSENING  Orders:  -     Echo  -     Case Request-Cath Lab: CATHETERIZATION, HEART, BOTH LEFT AND RIGHT; Standing  -     Establish IV access and maintain saline lock; Standing  -     Hold Warfarin; Standing  -     Hold Enoxaparin/subcutaneous Heparin; Standing  -     Hold Heparin drip; Standing  -     Height and weight; Standing  -     Verify informed consent; Standing  -     Vital signs; Standing  -     Cardiac Monitoring - Adult; Standing  -     Pulse Oximetry Q4H; Standing  -     Diet NPO; Standing  -     CBC auto differential; Standing  -     Basic metabolic panel; Standing    Severe mitral regurgitation  Comments:  PROBABLE PROGRESSION  Orders:  -     Echo  -     Case Request-Cath Lab: CATHETERIZATION, HEART, BOTH LEFT AND RIGHT; Standing  -     Establish IV access and maintain saline lock; Standing  -     Hold Warfarin; Standing  -     Hold Enoxaparin/subcutaneous Heparin; Standing  -     Hold Heparin drip; Standing  -     Height and weight; Standing  -     Verify informed consent; Standing  -     Vital signs; Standing  -     Cardiac Monitoring - Adult; Standing  -     Pulse Oximetry Q4H; Standing  -     Diet NPO; Standing  -     CBC auto differential; Standing  -     Basic metabolic panel; Standing    Coronary artery disease of native artery of native heart with stable angina pectoris  -     Case Request-Cath Lab: CATHETERIZATION, HEART, BOTH LEFT AND RIGHT; Standing  -     Establish IV access and  maintain saline lock; Standing  -     Hold Warfarin; Standing  -     Hold Enoxaparin/subcutaneous Heparin; Standing  -     Hold Heparin drip; Standing  -     Height and weight; Standing  -     Verify informed consent; Standing  -     Vital signs; Standing  -     Cardiac Monitoring - Adult; Standing  -     Pulse Oximetry Q4H; Standing  -     Diet NPO; Standing  -     CBC auto differential; Standing  -     Basic metabolic panel; Standing    Overweight (BMI 25.0-29.9)    Pulmonary hypertension  -     Echo  -     Case Request-Cath Lab: CATHETERIZATION, HEART, BOTH LEFT AND RIGHT; Standing  -     Establish IV access and maintain saline lock; Standing  -     Hold Warfarin; Standing  -     Hold Enoxaparin/subcutaneous Heparin; Standing  -     Hold Heparin drip; Standing  -     Height and weight; Standing  -     Verify informed consent; Standing  -     Vital signs; Standing  -     Cardiac Monitoring - Adult; Standing  -     Pulse Oximetry Q4H; Standing  -     Diet NPO; Standing  -     CBC auto differential; Standing  -     Basic metabolic panel; Standing    Other orders  -     0.9%  NaCl infusion  -     aspirin chewable tablet 81 mg  -     clopidogreL tablet 300 mg  -     pravastatin (PRAVACHOL) 40 MG tablet; Take 1 tablet (40 mg total) by mouth every evening.  Dispense: 90 tablet; Refill: 1    RTC Low level/low impact aerobic exercise 5x's/wk. Heart healthy diet and risk factor modification.    See labs and med orders.    Aerobic exercise 5x's/wk. Heart healthy diet and risk factor modification.    See labs and med orders.

## 2024-02-14 ENCOUNTER — OFFICE VISIT (OUTPATIENT)
Dept: CARDIOLOGY | Facility: CLINIC | Age: 74
End: 2024-02-14
Payer: MEDICARE

## 2024-02-14 VITALS
RESPIRATION RATE: 18 BRPM | DIASTOLIC BLOOD PRESSURE: 68 MMHG | HEART RATE: 73 BPM | BODY MASS INDEX: 28.5 KG/M2 | WEIGHT: 176.56 LBS | SYSTOLIC BLOOD PRESSURE: 136 MMHG

## 2024-02-14 DIAGNOSIS — I27.20 PULMONARY HYPERTENSION: Chronic | ICD-10-CM

## 2024-02-14 DIAGNOSIS — I34.0 SEVERE MITRAL REGURGITATION: Chronic | ICD-10-CM

## 2024-02-14 DIAGNOSIS — I25.118 CORONARY ARTERY DISEASE OF NATIVE ARTERY OF NATIVE HEART WITH STABLE ANGINA PECTORIS: Chronic | ICD-10-CM

## 2024-02-14 DIAGNOSIS — E66.3 OVERWEIGHT (BMI 25.0-29.9): Chronic | ICD-10-CM

## 2024-02-14 DIAGNOSIS — R06.02 SOB (SHORTNESS OF BREATH): Primary | ICD-10-CM

## 2024-02-14 PROCEDURE — 3078F DIAST BP <80 MM HG: CPT | Mod: CPTII,S$GLB,, | Performed by: INTERNAL MEDICINE

## 2024-02-14 PROCEDURE — 99215 OFFICE O/P EST HI 40 MIN: CPT | Mod: S$GLB,,, | Performed by: INTERNAL MEDICINE

## 2024-02-14 PROCEDURE — 1101F PT FALLS ASSESS-DOCD LE1/YR: CPT | Mod: CPTII,S$GLB,, | Performed by: INTERNAL MEDICINE

## 2024-02-14 PROCEDURE — 3008F BODY MASS INDEX DOCD: CPT | Mod: CPTII,S$GLB,, | Performed by: INTERNAL MEDICINE

## 2024-02-14 PROCEDURE — 1126F AMNT PAIN NOTED NONE PRSNT: CPT | Mod: CPTII,S$GLB,, | Performed by: INTERNAL MEDICINE

## 2024-02-14 PROCEDURE — 3288F FALL RISK ASSESSMENT DOCD: CPT | Mod: CPTII,S$GLB,, | Performed by: INTERNAL MEDICINE

## 2024-02-14 PROCEDURE — 1159F MED LIST DOCD IN RCRD: CPT | Mod: CPTII,S$GLB,, | Performed by: INTERNAL MEDICINE

## 2024-02-14 PROCEDURE — 3075F SYST BP GE 130 - 139MM HG: CPT | Mod: CPTII,S$GLB,, | Performed by: INTERNAL MEDICINE

## 2024-02-14 RX ORDER — NAPROXEN SODIUM 220 MG/1
81 TABLET, FILM COATED ORAL ONCE
Status: CANCELLED | OUTPATIENT
Start: 2024-02-14 | End: 2024-02-14

## 2024-02-14 RX ORDER — CLOPIDOGREL BISULFATE 300 MG/1
300 TABLET, FILM COATED ORAL ONCE
Status: CANCELLED | OUTPATIENT
Start: 2024-02-14 | End: 2024-02-14

## 2024-02-14 RX ORDER — SODIUM CHLORIDE 9 MG/ML
INJECTION, SOLUTION INTRAVENOUS ONCE
Status: CANCELLED | OUTPATIENT
Start: 2024-02-14 | End: 2024-02-14

## 2024-02-14 RX ORDER — PRAVASTATIN SODIUM 40 MG/1
40 TABLET ORAL NIGHTLY
Qty: 90 TABLET | Refills: 1 | Status: SHIPPED | OUTPATIENT
Start: 2024-02-14

## 2024-02-14 NOTE — PATIENT INSTRUCTIONS
Angiogram  2/22 at 9 am     Arrive for procedure at: [facility]    You will receive a phone call from [facility] Pre-Op Department with further instructions and exact arrival time prior to your scheduled procedure.    Notify the nurse if you are ALLERGIC TO IODINE.    FASTING: You MAY NOT have anything to eat or drink AFTER MIDNIGHT the day before your procedure. If your procedure is scheduled in the afternoon, you may have a LIGHT BREAKFAST 6-8 hours prior to your procedure.  For example: Two slices of toast; black coffee or black tea.    MEDICATIONS: You may take your regular morning medications with water. If there are any medications that you should not take, you will be instructed to hold them for that morning.    CARDIOLOGY PRE-PROCEDURE MEDICATION ORDERS:  ** Please hold any medications that are checked below:    HOLD   # OF DAYS TO HOLD  Coumadin   Consult with Coumadin Clinic   Xarelto    _DAY BEFORE & DAY OF_  Pradaxa  _ DAY BEFORE & DAY OF _  Eliquis   _ DAY BEFORE & DAY OF _  Metformin    Day before procedure & morning of procedure  Short acting insulin   Morning of procedure    CONTINUE the Following Medications   Plavix      Effient     Aspirin    WHAT TO EXPECT:    How long will the procedure take?  The procedure will take an average of 1 - 2 hours to perform.  After the procedure, you will need to lay flat for around 4 - 6 hours to minimize bleeding from the puncture site. If the wrist is accessed you will need to keep your arm still as instructed by the nurse.    When can I go home?  You may be able to be discharged home that same afternoon if there were no complications.  If you have one of the following: balloon; stent; pacemaker or defibrillator procedures, you may spend one night for observation.  Your doctor will determine your discharge based upon your progress.  The results of your procedure will be discussed with you before you are discharged.  Any further testing or  procedures will be scheduled for you either before you leave or you will be instructed to call for a future appointment.      TRANSPORTATION:  PLEASE ARRANGE TO HAVE SOMEONE DRIVE YOU HOME FOLLOWING YOUR PROCEDURE, YOU WILL NOT BE ALLOWED TO DRIVE.

## 2024-02-15 ENCOUNTER — TELEPHONE (OUTPATIENT)
Dept: CARDIOLOGY | Facility: CLINIC | Age: 74
End: 2024-02-15
Payer: MEDICARE

## 2024-02-15 NOTE — TELEPHONE ENCOUNTER
----- Message from Sarah Paul sent at 2/15/2024  9:21 AM CST -----  .Type:  Patient Call Back    Who Called: PT       Does the patient know what this is regarding?: PT CALLED TO RESCHEDULE THE ECHO AND SPEAK WITH THE OFFICE     Would the patient rather a call back YES     Best Call Back Number:  [phone]    Additional Information: Thank You

## 2024-02-16 ENCOUNTER — HOSPITAL ENCOUNTER (OUTPATIENT)
Dept: CARDIOLOGY | Facility: HOSPITAL | Age: 74
Discharge: HOME | End: 2024-02-16
Attending: INTERNAL MEDICINE
Payer: MEDICARE

## 2024-02-16 VITALS — WEIGHT: 176 LBS | HEIGHT: 66 IN | BODY MASS INDEX: 28.28 KG/M2

## 2024-02-16 LAB
ASCENDING AORTA: 2.94 CM
AV INDEX (PROSTH): 0.67
AV MEAN GRADIENT: 5 MMHG
AV PEAK GRADIENT: 10 MMHG
AV VALVE AREA BY VELOCITY RATIO: 2.15 CM²
AV VALVE AREA: 2.24 CM²
AV VELOCITY RATIO: 0.65
BSA FOR ECHO PROCEDURE: 1.93 M2
CV ECHO LV RWT: 0.4 CM
DOP CALC AO PEAK VEL: 1.55 M/S
DOP CALC AO VTI: 34.1 CM
DOP CALC LVOT AREA: 3.3 CM2
DOP CALC LVOT DIAMETER: 2.06 CM
DOP CALC LVOT PEAK VEL: 1 M/S
DOP CALC LVOT STROKE VOLUME: 76.29 CM3
DOP CALC MV VTI: 56.6 CM
DOP CALCLVOT PEAK VEL VTI: 22.9 CM
E WAVE DECELERATION TIME: 189.57 MSEC
E/A RATIO: 1.66
ECHO LV POSTERIOR WALL: 0.88 CM (ref 0.6–1.1)
EJECTION FRACTION: 60 %
FRACTIONAL SHORTENING: 32 % (ref 28–44)
INTERVENTRICULAR SEPTUM: 1.29 CM (ref 0.6–1.1)
LEFT ATRIUM VOLUME INDEX MOD: 62.1 ML/M2
LEFT ATRIUM VOLUME MOD: 117.3 CM3
LEFT INTERNAL DIMENSION IN SYSTOLE: 2.98 CM (ref 2.1–4)
LEFT VENTRICLE DIASTOLIC VOLUME INDEX: 46.03 ML/M2
LEFT VENTRICLE DIASTOLIC VOLUME: 87 ML
LEFT VENTRICLE MASS INDEX: 87 G/M2
LEFT VENTRICLE SYSTOLIC VOLUME INDEX: 18.2 ML/M2
LEFT VENTRICLE SYSTOLIC VOLUME: 34.38 ML
LEFT VENTRICULAR INTERNAL DIMENSION IN DIASTOLE: 4.39 CM (ref 3.5–6)
LEFT VENTRICULAR MASS: 165.08 G
LVOT MG: 2.1 MMHG
LVOT MV: 0.67 CM/S
MV MEAN GRADIENT: 8 MMHG
MV PEAK A VEL: 1.19 M/S
MV PEAK E VEL: 1.97 M/S
MV PEAK GRADIENT: 20 MMHG
MV STENOSIS PRESSURE HALF TIME: 60.82 MS
MV VALVE AREA BY CONTINUITY EQUATION: 1.35 CM2
MV VALVE AREA P 1/2 METHOD: 3.62 CM2
PISA MRMAX VEL: 5.69 M/S
PISA TR MAX VEL: 3.56 M/S
PULM VEIN S/D RATIO: 0.52
PV PEAK D VEL: 0.95 M/S
PV PEAK S VEL: 0.49 M/S
RA PRESSURE ESTIMATED: 3 MMHG
RA VOL SYS: 49.14 ML
RIGHT ATRIAL AREA: 15.5 CM2
RIGHT VENTRICULAR END-DIASTOLIC DIMENSION: 3.71 CM
RIGHT VENTRICULAR LENGTH IN DIASTOLE (APICAL 4-CHAMBER VIEW): 6.95 CM
RV MID DIAMA: 2.85 CM
RV TB RVSP: 7 MMHG
RV TISSUE DOPPLER FREE WALL SYSTOLIC VELOCITY 1 (APICAL 4 CHAMBER VIEW): 11.47 CM/S
SINUS: 2.59 CM
STJ: 2.33 CM
TR MAX PG: 51 MMHG
TRICUSPID ANNULAR PLANE SYSTOLIC EXCURSION: 2.05 CM
TV REST PULMONARY ARTERY PRESSURE: 54 MMHG
Z-SCORE OF LEFT VENTRICULAR DIMENSION IN END DIASTOLE: -1.87
Z-SCORE OF LEFT VENTRICULAR DIMENSION IN END SYSTOLE: -0.71

## 2024-02-16 PROCEDURE — 93306 TTE W/DOPPLER COMPLETE: CPT | Mod: PO

## 2024-02-16 PROCEDURE — 93306 TTE W/DOPPLER COMPLETE: CPT | Mod: 26,,, | Performed by: INTERNAL MEDICINE

## 2024-02-29 ENCOUNTER — TELEPHONE (OUTPATIENT)
Dept: VASCULAR SURGERY | Facility: CLINIC | Age: 74
End: 2024-02-29
Payer: MEDICARE

## 2024-02-29 NOTE — TELEPHONE ENCOUNTER
Spoke w/ pt to question if she will be coming to appt that was scheduled for 2:30p. Pt stated she was unaware of appt. Rescheduled to 3/7/24. Pt verbalized understanding.

## 2024-02-29 NOTE — TELEPHONE ENCOUNTER
Pt called asking for directions. [address] Walk in entrance one and proceed to second floor. Pt verbalized understanding.

## 2024-03-03 DIAGNOSIS — J32.9 SINUSITIS, UNSPECIFIED CHRONICITY, UNSPECIFIED LOCATION: ICD-10-CM

## 2024-03-04 RX ORDER — LEVOCETIRIZINE DIHYDROCHLORIDE 5 MG/1
5 TABLET, FILM COATED ORAL NIGHTLY
Qty: 90 TABLET | Refills: 1 | Status: SHIPPED | OUTPATIENT
Start: 2024-03-04 | End: 2024-05-10

## 2024-03-06 ENCOUNTER — TELEPHONE (OUTPATIENT)
Dept: VASCULAR SURGERY | Facility: CLINIC | Age: 74
End: 2024-03-06
Payer: MEDICARE

## 2024-03-07 ENCOUNTER — OFFICE VISIT (OUTPATIENT)
Dept: VASCULAR SURGERY | Facility: CLINIC | Age: 74
End: 2024-03-07
Payer: MEDICARE

## 2024-03-07 VITALS
SYSTOLIC BLOOD PRESSURE: 163 MMHG | BODY MASS INDEX: 28.23 KG/M2 | DIASTOLIC BLOOD PRESSURE: 93 MMHG | HEART RATE: 74 BPM | HEIGHT: 66 IN | WEIGHT: 175.69 LBS

## 2024-03-07 DIAGNOSIS — I27.20 PULMONARY HYPERTENSION: ICD-10-CM

## 2024-03-07 DIAGNOSIS — I34.0 NONRHEUMATIC MITRAL VALVE REGURGITATION: Primary | ICD-10-CM

## 2024-03-07 DIAGNOSIS — K08.9 POOR DENTITION: ICD-10-CM

## 2024-03-07 PROCEDURE — 1159F MED LIST DOCD IN RCRD: CPT | Mod: CPTII,S$GLB,, | Performed by: THORACIC SURGERY (CARDIOTHORACIC VASCULAR SURGERY)

## 2024-03-07 PROCEDURE — 3077F SYST BP >= 140 MM HG: CPT | Mod: CPTII,S$GLB,, | Performed by: THORACIC SURGERY (CARDIOTHORACIC VASCULAR SURGERY)

## 2024-03-07 PROCEDURE — 99205 OFFICE O/P NEW HI 60 MIN: CPT | Mod: S$GLB,,, | Performed by: THORACIC SURGERY (CARDIOTHORACIC VASCULAR SURGERY)

## 2024-03-07 PROCEDURE — 1126F AMNT PAIN NOTED NONE PRSNT: CPT | Mod: CPTII,S$GLB,, | Performed by: THORACIC SURGERY (CARDIOTHORACIC VASCULAR SURGERY)

## 2024-03-07 PROCEDURE — 99999 PR PBB SHADOW E&M-EST. PATIENT-LVL IV: CPT | Mod: PBBFAC,,, | Performed by: THORACIC SURGERY (CARDIOTHORACIC VASCULAR SURGERY)

## 2024-03-07 PROCEDURE — 3080F DIAST BP >= 90 MM HG: CPT | Mod: CPTII,S$GLB,, | Performed by: THORACIC SURGERY (CARDIOTHORACIC VASCULAR SURGERY)

## 2024-03-07 RX ORDER — AMOXICILLIN AND CLAVULANATE POTASSIUM 875; 125 MG/1; MG/1
TABLET, FILM COATED ORAL
Qty: 6 TABLET | Refills: 11 | Status: SHIPPED | OUTPATIENT
Start: 2024-03-07

## 2024-03-07 NOTE — PROGRESS NOTES
DATE OF CONSULTATION: 3/7/2024     CONSULT REQUESTED BY:  Dr. Cuong Hinkle     REASON FOR CONSULTATION:  Severe mitral valve regurgitation (posteriorly-directed jet) with pulmonary hypertension (54)     HPI:   The patient is a 74-year-old  female who is a very poor historian.    The patient has been followed regularly by Dr. Hinkle with known mitral regurgitation and associated pulmonary hypertension.    Over the past several months, the patient has experienced progression in her baseline exertional dyspnea.  Her  notices the decrease in exercise tolerance.  The patient, herself, describes lower extremity instability rather than shortness of breath.  When asked specifically about shortness of breath, she readily agrees that she experiences dyspnea.    Recent transthoracic echocardiography 02/16/2024 demonstrates an ejection fraction of 60%, normal right ventricular function, left atrial enlargement.  There is severe mitral valve regurgitation with a posteriorly-directed jet.  There is mild to moderate mitral stenosis with a mean gradient of 8.  There is mild to moderate tricuspid valve regurgitation-the tricuspid annular diameter is not measured.  Pulmonary pressures are estimated at 54.    Subsequent left heart catheterization with coronary angiography, 02/22/2024, demonstrates no significant epicardial coronary artery disease.  The left ventricular end-diastolic pressure is elevated (19).  The initial PA pressure was 70/36 with a mean of 51.  The PA pressure dropped to 50/31 with a mean of 33 after a single dose of intra arterial nitroglycerin.    The patient visits the office today accompanied by her  to discuss mitral valve replacement with ligation of the left atrial appendage.    As mentioned, the patient is a very poor historian.  She describes an unsteady gait as the symptom which has worsened over the last several months.  When asked about dyspnea, specifically, she agrees that  she experiences dyspnea.  She has not, however, had other symptoms of congestive heart failure-no orthopnea and no lower extremity edema     Data Review:  In preparation for this consultation, I have reviewed the patient's entire [facility] medical record.  I have personally reviewed and interpreted images from the recent transthoracic echo and coronary angiogram.  I have had a brief telephone discussion about this patient with Dr. Hinkle      Past Medical History:   Diagnosis Date    Arthritis     Coronary artery disease     GERD (gastroesophageal reflux disease)     Hypertension     Sleep apnea     SOB (shortness of breath)     Stroke     TIA approx 2021        Hypertension, pulmonary hypertension, obstructive sleep apnea, degenerative joint disease     Past Surgical History:   Procedure Laterality Date    CATARACT EXTRACTION EXTRACAPSULAR W/ INTRAOCULAR LENS IMPLANTATION      CATHETERIZATION OF BOTH LEFT AND RIGHT HEART N/A 02/15/2022    Procedure: CATHETERIZATION, HEART, BOTH LEFT AND RIGHT;  Surgeon: Cuong Hinkle MD;  Location: [facility] CATH;  Service: Cardiology;  Laterality: N/A;    CATHETERIZATION OF BOTH LEFT AND RIGHT HEART  2/22/2024    Procedure: Right / Left heart cath;  Surgeon: Cuong Hinkle MD;  Location: [facility] CATH;  Service: Cardiology;;    CHOLECYSTECTOMY      COLONOSCOPY      COLONOSCOPY N/A 3/15/2022    Procedure: COLONOSCOPY;  Surgeon: Varun Toro MD;  Location: [facility] ENDO;  Service: Endoscopy;  Laterality: N/A;    CORONARY ANGIOGRAPHY N/A 2/22/2024    Procedure: ANGIOGRAM, CORONARY ARTERY;  Surgeon: Cuong Hinkle MD;  Location: [facility] CATH;  Service: Cardiology;  Laterality: N/A;    HYSTERECTOMY          Bilateral cataract repair, cholecystectomy, hysterectomy     Social History     Socioeconomic History    Marital status:    Tobacco Use    Smoking status: Never    Smokeless tobacco: Never   Substance and Sexual Activity    Alcohol use: No    Drug use: No    Sexual activity: Not  Currently        The patient is a lifetime nonsmoker.  She consumes 1 or 2 alcoholic beverages per year.  She is  and raised 6 children.  She has slowed down considerably over the last couple of years.  Prior to that, she lived a busy and active lifestyle.  She is a retired nursing home .  The patient has 6 native lower jaw teeth.  She does not receive regular dental care.      Today, I prescribed Augmentin dental prophylaxis for her.       Allergies:  No known drug allergies  Sensitivities:  Thiazides-hypercalcemia, lisinopril-cough, Flexeril-rash, losartan-nausea      Prior to Admission Meds (Last known outpatient meds at time of note signature)   Prior to Admission medications    Medication Sig Start Date End Date Taking? Authorizing Provider   albuterol (PROVENTIL/VENTOLIN HFA) 90 mcg/actuation inhaler Inhale 1-2 puffs into the lungs every 6 (six) hours as needed for Wheezing. Rescue 9/3/22  Yes Jackson Lott MD   atenoloL (TENORMIN) 50 MG tablet Take 1 tablet (50 mg total) by mouth once daily. 7/20/23  Yes Sebastian Montiel III, PA-C   doxycycline (VIBRA-TABS) 100 MG tablet Take 1 tablet (100 mg total) by mouth 2 (two) times daily. 2/8/24  Yes Sebastian Montiel III, PA-C   ergocalciferol (ERGOCALCIFEROL) 50,000 unit Cap Take 1 capsule (50,000 Units total) by mouth every Monday. 6/27/23  Yes Sebastian Montiel III, PA-C   fluticasone propionate (FLONASE) 50 mcg/actuation nasal spray USE 2 SPRAYS IN EACH NOSTRIL ONE TIME DAILY 9/28/23  Yes Sebastian Montiel III, PA-C   furosemide (LASIX) 20 MG tablet Take 1 tablet (20 mg total) by mouth once daily. 1/10/24  Yes Maria De Jesus Chávez NP   HYDROcodone-acetaminophen (NORCO) 7.5-325 mg per tablet Take 1 tablet by mouth every 8 (eight) hours as needed for Pain. 6/27/23  Yes Sebastian Montiel III, PA-C   levocetirizine (XYZAL) 5 MG tablet TAKE 1 TABLET BY MOUTH EVERY DAY IN THE EVENING 3/4/24  Yes Sebastian Montiel III PARamyaC   magnesium oxide (MAG-OX) 400 mg (241.3 mg  magnesium) tablet TAKE 1 TABLET EVERY DAY 1/11/24  Yes Sebastian Montiel III, PA-C   montelukast (SINGULAIR) 10 mg tablet TAKE 1 TABLET EVERY EVENING 5/1/23  Yes Sebastian Montiel III, PA-C   omeprazole (PRILOSEC) 40 MG capsule TAKE 1 CAPSULE (40 MG TOTAL) BY MOUTH ONCE DAILY. 9/12/23  Yes Sebastian Montiel III, PA-C   potassium chloride (KLOR-CON 10) 10 MEQ TbSR Take 1 tablet (10 mEq total) by mouth once daily. 4/12/23  Yes Cuong Hinkle MD   pravastatin (PRAVACHOL) 40 MG tablet Take 1 tablet (40 mg total) by mouth every evening. 2/14/24  Yes Cuong Hinkle MD   sildenafil (REVATIO) 20 mg Tab Take 1 tablet (20 mg total) by mouth 3 (three) times daily. 7/7/23 7/6/24 Yes Cuong Hinkle MD   sucralfate (CARAFATE) 1 gram tablet Take 1 g by mouth every evening.   Yes Provider, Historical   tiZANidine (ZANAFLEX) 4 MG tablet Take 1 tablet (4 mg total) by mouth nightly as needed (muscle spasms/ pain). 7/28/23  Yes Christina Rocha PA-C   triamcinolone acetonide 0.1% (KENALOG) 0.1 % cream Apply topically 2 (two) times daily. 4/27/23  Yes Sebastian Montiel III, PA-C   amoxicillin-clavulanate 875-125mg (AUGMENTIN) 875-125 mg per tablet Take iii tablets by mouth one hour prior to any dental intervention. 3/7/24   Angela Mcdowell MD        Review of Systems:   Constitutional: no fever, no chills, no appetite change, no unexpected weight change   Dermatological: no jaundice, no rash, no nodules, no ulcers, no pruritis   HEENT: no vision change, no hearing change, no nasal discharge, no sore throat   Neck: no unusual stiffness, no swollen glands, no goiter   Respiratory: (+) dyspnea, no cough, no hemoptysis, no wheezing,  Cardiovascular: no chest pain, no palpitations, no edema   Gastrointestinal: no abdominal discomfort   Musculoskeletal: no new joint pain, no joint swelling, no myalgia   : no dysuria, no frequency, no gross hematuria   HEME: no prolonged bleeding, no excessive bruising, no blood clots, no adenopathy    Endocrine: no excessive thirst, no unusual intolerance of heat or cold   Neurological: no confusion, no seizures, no syncope, no falls   Psychological: no anxiety, no depression     Objective:   Vitals:    03/07/24 1311   BP: (!) 163/93   Pulse: 74       Physical Exam:   Constitutional: Alert, appears stated age, cooperative and no distress.  Mildly obese body habitus, no obvious deformities, good attention to grooming.   Head: Normocephalic, without obvious abnormality, atraumatic   Eyes: Conjunctivae/corneas clear. PERRL, EOM's intact. No exudate.  Nose: Nares normal. Septum midline. Mucosa normal. No drainage or sinus tenderness.   Throat: Lips, mucosa, and tongue normal; 6 lower-jaw teeth-fair   Neck: No adenopathy, (+) carotid bruit, no JVD, supple, symmetrical, trachea midline, and thyroid not enlarged, symmetric, no tenderness/mass/nodules.   Back: Symmetric, no curvature ROM normal No CVA tenderness.   Lungs: Clear to auscultation bilaterally and good air exchange. No tachypnea. No use of accessory muscles.   Heart: Regular rate and rhythm, S1, S2 normal, loud systolic murmur, no click, rub or gallop. PMI nondisplaced.   Abdomen: Soft, non-tender, bowel sounds normal; No hepatosplenomegaly. No hernias   Aorta: no evidence of aneurysm   Musculoskeletal: No evidence of kyphosis or scoliosis. Normal, but slow, gait. Normal muscle strength and tone. No evidence of limb atrophy or abnormal movements.   Extremities: Normal, atraumatic, no clubbing, cyanosis, or edema. Hair present on pretibial regions. There are very superficial bilateral lower extremity venous varicosities   Pulses: 2+ and symmetric in both radial and femoral regions. Bilateral pedal pulses 2+   Skin: Skin color, texture, turgor normal.  No rashes or lesions.  Lymph nodes: Cervical, supraclavicular, and axillary nodes normal   Neurologic/psychiatric: Cranial nerves 2-12 are grossly intact No obvious abnormality. Oriented to person, place, and  time. No depression, anxiety or agitation.     Assessment:  Patient Active Problem List    Diagnosis Date Noted    SOB (shortness of breath) 02/14/2024    Hyperparathyroidism 02/08/2024    DOMINIC on CPAP 10/25/2023    Encounter for pre-operative cardiovascular clearance 06/21/2023    PVC (premature ventricular contraction) 06/21/2023    Multiple thyroid nodules 05/09/2023    Hypercalcemia 05/09/2023    Age-related osteoporosis without current pathological fracture 05/09/2023    Hypomagnesemia 04/12/2023    Mild carotid artery disease 04/12/2023    Mitral regurgitation and aortic stenosis 11/02/2022    Pulmonary hypertension 11/02/2022    Carotid artery plaque, left 11/02/2022    Gastroesophageal reflux disease 07/28/2022    Vitamin D deficiency disease 07/28/2022    Seasonal allergies 07/28/2022    Moderate to severe mitral regurgitation 03/30/2022    Coronary artery disease of native artery of native heart with stable angina pectoris 03/30/2022    Abnormal nuclear stress test 02/09/2022    Cervicalgia 01/27/2022    Chest pain with high risk of acute coronary syndrome 01/14/2022    Bradycardia 01/14/2022    Severe pulmonary hypertension 01/14/2022    Severe mitral regurgitation 01/14/2022    Elevated left ventricular end-diastolic pressure (LVEDP) 01/14/2022    H/O: rheumatic fever 01/14/2022    Overweight (BMI 25.0-29.9) 01/14/2022    Primary hypertension 01/14/2022    Hypercholesterolemia 01/14/2022    Nonspecific abnormal electrocardiogram (ECG) (EKG) 01/14/2022    Imbalance 01/14/2022    Mitral valve disorder 03/05/2018          Plan:  The patient has severe, symptomatic mitral valve regurgitation.  The jet is directed posteriorly suggesting a defect with the anterior mitral leaflet.    The patient has significant pulmonary hypertension which normalized with intra-arterial nitroglycerin.  She is currently prescribed Revatio 20 mg TID.     She will benefit from mitral valve replacement and ligation of the left  atrial appendage.    I had a long (> 1 hour) discussion with the patient and her  in the office this afternoon.  I reviewed the anatomy and pathophysiology of mitral valve regurgitation and pulmonary hypertension with them.    I also reviewed, in detail, the potential surgical risks and the expected benefits and outcome of mitral valve replacement with ligation of the left atrial appendage.    The patient has had no recent dental care.  Today, I prescribed antibiotic dental prophylaxis and have encouraged her to get dental clearance for the lower jaw teeth.    I will see the patient back in the office after she has had dental clearance.  At that time, we will discuss and schedule mitral valve replacement and ligation of the left atrial appendage.    Thank you, Dr. Hinkle, for allowing me to participate in the care of this patient.

## 2024-03-28 ENCOUNTER — TELEPHONE (OUTPATIENT)
Dept: VASCULAR SURGERY | Facility: CLINIC | Age: 74
End: 2024-03-28

## 2024-03-28 ENCOUNTER — OFFICE VISIT (OUTPATIENT)
Dept: VASCULAR SURGERY | Facility: CLINIC | Age: 74
End: 2024-03-28
Payer: MEDICARE

## 2024-03-28 VITALS
SYSTOLIC BLOOD PRESSURE: 157 MMHG | HEIGHT: 66 IN | WEIGHT: 175.06 LBS | BODY MASS INDEX: 28.14 KG/M2 | HEART RATE: 62 BPM | DIASTOLIC BLOOD PRESSURE: 83 MMHG

## 2024-03-28 DIAGNOSIS — E11.9 DIABETES: ICD-10-CM

## 2024-03-28 DIAGNOSIS — R09.89 BILATERAL CAROTID BRUITS: Primary | ICD-10-CM

## 2024-03-28 DIAGNOSIS — Z01.818 ENCOUNTER FOR OTHER PREPROCEDURAL EXAMINATION: ICD-10-CM

## 2024-03-28 DIAGNOSIS — I34.0 MITRAL VALVE INSUFFICIENCY, UNSPECIFIED ETIOLOGY: ICD-10-CM

## 2024-03-28 DIAGNOSIS — I34.0 MITRAL VALVE INSUFFICIENCY, UNSPECIFIED ETIOLOGY: Primary | ICD-10-CM

## 2024-03-28 DIAGNOSIS — I25.810 CORONARY ARTERY DISEASE INVOLVING AUTOLOGOUS ARTERY CORONARY BYPASS GRAFT: ICD-10-CM

## 2024-03-28 PROCEDURE — 1159F MED LIST DOCD IN RCRD: CPT | Mod: CPTII,S$GLB,, | Performed by: THORACIC SURGERY (CARDIOTHORACIC VASCULAR SURGERY)

## 2024-03-28 PROCEDURE — 99999 PR PBB SHADOW E&M-EST. PATIENT-LVL IV: CPT | Mod: PBBFAC,,, | Performed by: THORACIC SURGERY (CARDIOTHORACIC VASCULAR SURGERY)

## 2024-03-28 PROCEDURE — 3079F DIAST BP 80-89 MM HG: CPT | Mod: CPTII,S$GLB,, | Performed by: THORACIC SURGERY (CARDIOTHORACIC VASCULAR SURGERY)

## 2024-03-28 PROCEDURE — 99213 OFFICE O/P EST LOW 20 MIN: CPT | Mod: S$GLB,,, | Performed by: THORACIC SURGERY (CARDIOTHORACIC VASCULAR SURGERY)

## 2024-03-28 PROCEDURE — 3077F SYST BP >= 140 MM HG: CPT | Mod: CPTII,S$GLB,, | Performed by: THORACIC SURGERY (CARDIOTHORACIC VASCULAR SURGERY)

## 2024-03-28 PROCEDURE — 1125F AMNT PAIN NOTED PAIN PRSNT: CPT | Mod: CPTII,S$GLB,, | Performed by: THORACIC SURGERY (CARDIOTHORACIC VASCULAR SURGERY)

## 2024-03-28 RX ORDER — CEFAZOLIN SODIUM 2 G/50ML
2 SOLUTION INTRAVENOUS
Status: CANCELLED | OUTPATIENT
Start: 2024-03-28

## 2024-03-28 RX ORDER — CHLORHEXIDINE GLUCONATE ORAL RINSE 1.2 MG/ML
10 SOLUTION DENTAL
Status: CANCELLED | OUTPATIENT
Start: 2024-03-28

## 2024-03-28 RX ORDER — MUPIROCIN 20 MG/G
OINTMENT TOPICAL
Status: CANCELLED | OUTPATIENT
Start: 2024-03-28

## 2024-03-28 NOTE — TELEPHONE ENCOUNTER
R/c to pt and went over when CT & US is scheduled. T verbalized understanding.      ----- Message from Roverto Bustillo sent at 3/28/2024  2:43 PM CDT -----  Type: Needs Medical Advice  Who Called:  pt  Best Call Back Number: [phone]    Additional Information: Pt is calling the office has a question for the nurse.Please call back and advise.

## 2024-03-28 NOTE — TELEPHONE ENCOUNTER
Spoke w/ pt. Informed Dr. Mcdowell added test. CT scheduled for 4/6 @ 10:15a and US scheduled for 4/12 @ 11:30. Directions given to [facility]. Pt verbalized understanding.

## 2024-03-28 NOTE — PROGRESS NOTES
3/28/2024    HISTORY:  We are seeing this patient for severe, symptomatic mitral valve regurgitation and pulmonary hypertension.  Echocardiography demonstrates a PA pressure of 54.  She has been prescribed Revatio by Dr. Hinkle.    She has been seen by a dentist for her lower-jaw teeth.  He has cleared her for MVR and ligation of the left atrial appendage.    I had a lingering question about her obstructive sleep apnea.  She does, indeed, have obstructive sleep apnea and has been prescribed a CPAP machine.  She does not use the CPAP machine regularly but it has been prescribed for her.  I have asked her to bring it with her to the hospital for postop.    Since her last visit, the patient has had no change in her symptoms.  Specifically, she has no worse shortness of breath.    The patient visits the office today accompanied by her .    Today, I reviewed the patient's concurrent medical history, past surgical history, family history, allergies and current medications.    REVIEW OF SYSTEMS:  Constitutional: no fever, no chills, no appetite change, no unexpected weight change  Dermatological:  no jaundice, no rash no nodules, no ulcers, no pruritis  HEENT: no vision change, no hearing change, no nasal discharge, no sore throat  Neck: no unusual stiffness, no swollen glands, no goiter  Respiratory: no dyspnea, no cough, no hemoptysis, no wheezing  Cardiovascular: no chest pain, no palpitations, no edema  Gastrointestinal: no abdominal discomfort  Musculoskeletal: no new joint, no joint swelling, no mylagia  : no dysuria, no frequency, no gross hematuria  HEME: no prolonged bleeding, no excessive bruising, no blood clots, no adenopathy  Endocrine: no excessive thirst, no unusual intolerance of heat and cold  Neurological: no confusion, no seizures, no syncope, no falls  Psychological: no anxiety, no depression    Prior to Admission medications    Medication Sig Start Date End Date Taking? Authorizing Provider    albuterol (PROVENTIL/VENTOLIN HFA) 90 mcg/actuation inhaler Inhale 1-2 puffs into the lungs every 6 (six) hours as needed for Wheezing. Rescue 9/3/22  Yes Jackson Lott MD   amoxicillin-clavulanate 875-125mg (AUGMENTIN) 875-125 mg per tablet Take iii tablets by mouth one hour prior to any dental intervention. 3/7/24  Yes Angela Mcdowell MD   atenoloL (TENORMIN) 50 MG tablet Take 1 tablet (50 mg total) by mouth once daily. 7/20/23  Yes Sebastian Montiel III, PA-C   doxycycline (VIBRA-TABS) 100 MG tablet Take 1 tablet (100 mg total) by mouth 2 (two) times daily. 2/8/24  Yes Sebastian Montiel III, PA-C   ergocalciferol (ERGOCALCIFEROL) 50,000 unit Cap Take 1 capsule (50,000 Units total) by mouth every Monday. 6/27/23  Yes Sebastian Montiel III, PA-C   fluticasone propionate (FLONASE) 50 mcg/actuation nasal spray USE 2 SPRAYS IN EACH NOSTRIL ONE TIME DAILY 9/28/23  Yes Sebastian Montiel III, PA-C   furosemide (LASIX) 20 MG tablet Take 1 tablet (20 mg total) by mouth once daily. 1/10/24  Yes Maria De Jesus Chávez NP   HYDROcodone-acetaminophen (NORCO) 7.5-325 mg per tablet Take 1 tablet by mouth every 8 (eight) hours as needed for Pain. 6/27/23  Yes Sebastian Montiel III, PA-C   levocetirizine (XYZAL) 5 MG tablet TAKE 1 TABLET BY MOUTH EVERY DAY IN THE EVENING 3/4/24  Yes Sebastian Montiel III, PA-C   magnesium oxide (MAG-OX) 400 mg (241.3 mg magnesium) tablet TAKE 1 TABLET EVERY DAY 1/11/24  Yes Sebastian Montiel III, PA-C   montelukast (SINGULAIR) 10 mg tablet TAKE 1 TABLET EVERY EVENING 5/1/23  Yes Sebastian Montiel III, PA-C   omeprazole (PRILOSEC) 40 MG capsule TAKE 1 CAPSULE (40 MG TOTAL) BY MOUTH ONCE DAILY. 9/12/23  Yes Sebastian Montiel III, PA-C   potassium chloride (KLOR-CON 10) 10 MEQ TbSR Take 1 tablet (10 mEq total) by mouth once daily. 4/12/23  Yes Cuong Hinkle MD   pravastatin (PRAVACHOL) 40 MG tablet Take 1 tablet (40 mg total) by mouth every evening. 2/14/24  Yes Cuong Hinkle MD   sildenafil (REVATIO) 20 mg  Tab Take 1 tablet (20 mg total) by mouth 3 (three) times daily. 7/7/23 7/6/24 Yes Cuong Hinkle MD   sucralfate (CARAFATE) 1 gram tablet Take 1 g by mouth every evening.   Yes Provider, Historical   tiZANidine (ZANAFLEX) 4 MG tablet Take 1 tablet (4 mg total) by mouth nightly as needed (muscle spasms/ pain). 7/28/23  Yes Christina Rocha PA-C   triamcinolone acetonide 0.1% (KENALOG) 0.1 % cream Apply topically 2 (two) times daily. 4/27/23   Sebastian Montiel III, PA-C                Objective:     Vitals:    03/28/24 0913   BP: (!) 157/83   Pulse: 62         PHYSICAL EXAM:  Constitutional: Alert, appears stated age, cooperative and no distress.  Normal body habitus, no obvious deformities, good attention to grooming.  Head: Normocephalic, without obvious abnormality, atraumatic.  Eyes: Conjunctive/corneas clear. PERRL, EOM intact, Fundi benign. No exudate.  Nose:  Nares normal, septum midline, mucosa normal, no drainage or sinus tenderness.  Throat: Lips mucosa and tongue normal; upper dentures with 6 native lower teeth  Neck: No edema me opacity no carotid bruit it no JVD supple symmetrical trachea midline and thyroid not enlarged symmetric no tenderness mass nodules  Back: Symmetric, no curvature.  ROM normal.  No CVA tenderness.  Lungs: Clear to auscultation bilaterally and god air exchange.  No tachypnea.  No use of accessory muscles.    Heart: Regular rate and rhythm.  S1, S2 normal, systolic murmur, no click rub or gallop. PMI nondisplaced.    Abdomen: Soft, non tender, bowel sounds normal  Aorta: no evidence of aneurysm   Musculoskeletal: No evidence of kyphosis or scoliosis. Normal gait. Normal muscle strength and tone. No evidence of limb atrophy or abnormal movements.   Extremities: Normal, atraumatic, no clubbing, cyanosis, or edema. Hair present on pretibial regions. There are no venous varicosities   Pulses: 2+ and symmetric in both radial and femoral regions. Bilateral pedal pulses 2+   Skin: Skin  color, texture, turgor normal.  No rashes or lesions.  Lymph nodes: Cervical, supraclavicular, and axillary nodes normal   Neurologic/psychiatric: Cranial nerves 2-12 are grossly intact No obvious abnormality. Oriented to person, place, and time. No depression, anxiety or agitation.    PLAN:  The patient is ready to schedule mitral valve replacement with ligation of the left atrial appendage.    I had a long (> 40 minutes) discussion with the patient and her  in the office this morning.  I re-reviewed the surgical indications and the sub optimal medical therapeutic alternative treatments.    I also reviewed, in detail, the potential surgical risks and the expected benefits and outcome of our surgical plans.    I calculated and discussed the 30-day STS risk for morbidity or mortality with them.    Procedure Type: Isolated MVR   PERIOPERATIVE OUTCOME ESTIMATE %   Operative Mortality 2.14%   Morbidity & Mortality 10.2%   Stroke 1.93%   Renal Failure 1.66%   Reoperation 3.66%   Prolonged Ventilation 6.36%   Deep Sternal Wound Infection 0.056%   Long Hospital Stay (>14 days) 6.54%   Short Hospital Stay (<6 days)* 17.7%       I have asked the patient to bring her CPAP machine, obtain a brassiere that closes in the front, and to wear her hair up on the top of her head for the day of surgery.    She has chosen Monday, 04/15/2024, for her mitral valve replacement, ligation of the left atrial appendage, insertion of a Robert-Shantelle catheter, and a femoral arterial line insertion.

## 2024-04-04 ENCOUNTER — LAB VISIT (OUTPATIENT)
Dept: PRIMARY CARE CLINIC | Facility: CLINIC | Age: 74
End: 2024-04-04
Payer: MEDICARE

## 2024-04-04 DIAGNOSIS — I25.118 CORONARY ARTERY DISEASE OF NATIVE ARTERY OF NATIVE HEART WITH STABLE ANGINA PECTORIS: Chronic | ICD-10-CM

## 2024-04-04 DIAGNOSIS — E78.00 HYPERCHOLESTEROLEMIA: Chronic | ICD-10-CM

## 2024-04-04 LAB
CHOLEST SERPL-MCNC: 183 MG/DL (ref 120–199)
CHOLEST/HDLC SERPL: 3.9 {RATIO} (ref 2–5)
HDLC SERPL-MCNC: 47 MG/DL (ref 40–75)
HDLC SERPL: 25.7 % (ref 20–50)
LDLC SERPL CALC-MCNC: 119.6 MG/DL (ref 63–159)
NONHDLC SERPL-MCNC: 136 MG/DL
TRIGL SERPL-MCNC: 82 MG/DL (ref 30–150)

## 2024-04-04 PROCEDURE — 36415 COLL VENOUS BLD VENIPUNCTURE: CPT | Mod: S$GLB,,, | Performed by: INTERNAL MEDICINE

## 2024-04-04 PROCEDURE — 80061 LIPID PANEL: CPT | Performed by: INTERNAL MEDICINE

## 2024-04-06 ENCOUNTER — HOSPITAL ENCOUNTER (OUTPATIENT)
Dept: RADIOLOGY | Facility: HOSPITAL | Age: 74
Discharge: HOME | End: 2024-04-06
Attending: THORACIC SURGERY (CARDIOTHORACIC VASCULAR SURGERY)
Payer: MEDICARE

## 2024-04-06 DIAGNOSIS — Z01.818 ENCOUNTER FOR OTHER PREPROCEDURAL EXAMINATION: ICD-10-CM

## 2024-04-06 PROCEDURE — 71250 CT THORAX DX C-: CPT | Mod: 26,,, | Performed by: RADIOLOGY

## 2024-04-06 PROCEDURE — 71250 CT THORAX DX C-: CPT | Mod: TC,PO

## 2024-04-12 ENCOUNTER — HOSPITAL ENCOUNTER (OUTPATIENT)
Dept: RADIOLOGY | Facility: HOSPITAL | Age: 74
Discharge: HOME | End: 2024-04-12
Attending: THORACIC SURGERY (CARDIOTHORACIC VASCULAR SURGERY)
Payer: MEDICARE

## 2024-04-12 DIAGNOSIS — R09.89 BILATERAL CAROTID BRUITS: ICD-10-CM

## 2024-04-12 PROCEDURE — 93880 EXTRACRANIAL BILAT STUDY: CPT | Mod: TC,PO

## 2024-04-12 PROCEDURE — 93880 EXTRACRANIAL BILAT STUDY: CPT | Mod: 26,,, | Performed by: RADIOLOGY

## 2024-04-15 PROBLEM — Z95.2 HX OF MITRAL VALVE REPLACEMENT: Status: ACTIVE | Noted: 2024-04-15

## 2024-04-16 RX ORDER — TRIAMCINOLONE ACETONIDE 1 MG/G
CREAM TOPICAL 2 TIMES DAILY
Qty: 80 G | Refills: 11 | Status: SHIPPED | OUTPATIENT
Start: 2024-04-16

## 2024-04-19 PROBLEM — Z95.4: Status: ACTIVE | Noted: 2024-04-19

## 2024-04-19 PROBLEM — Z79.01 LONG TERM (CURRENT) USE OF ANTICOAGULANTS: Status: ACTIVE | Noted: 2024-04-19

## 2024-04-22 ENCOUNTER — TELEPHONE (OUTPATIENT)
Dept: CARDIOLOGY | Facility: CLINIC | Age: 74
End: 2024-04-22
Payer: MEDICARE

## 2024-04-22 NOTE — TELEPHONE ENCOUNTER
Spoke with patient and confirmed hospital follow up appointment of 5/22/24 at 2:15 pm. Verbalized understanding.

## 2024-04-23 ENCOUNTER — TELEPHONE (OUTPATIENT)
Dept: VASCULAR SURGERY | Facility: CLINIC | Age: 74
End: 2024-04-23
Payer: MEDICARE

## 2024-04-23 NOTE — TELEPHONE ENCOUNTER
Spoke with pt for P/O CABG/MVR assessment call. Pt states feeling well, all incisions look good, pt reports showering daily to keep incision clean dry and MANISHA, denies any issues with incision sites. Pt instructed to not get in any bathtubs, hot-tubs or pools to prevent infections. Pt reports taking medications, walking and using IS as directed at discharge. No questions or concerns at this time. Pt given direct line to call for any questions or concerns. P/O appt confirmed for 5/9/24 CXR prior.

## 2024-05-07 ENCOUNTER — TELEPHONE (OUTPATIENT)
Dept: VASCULAR SURGERY | Facility: CLINIC | Age: 74
End: 2024-05-07
Payer: MEDICARE

## 2024-05-07 NOTE — TELEPHONE ENCOUNTER
----- Message from Claire Kim, Patient Care Assistant sent at 5/7/2024  1:03 PM CDT -----  Contact: [place]/Paula  Type: Needs Medical Advice    Who Called: Karlo Mitchell  Best Call Back Number: [phone]  Inquiry/Question: Paula is calling to see if the pt is to get an INR due to she will be coming into the office the day she is due. Please advise Thank you~

## 2024-05-09 ENCOUNTER — OFFICE VISIT (OUTPATIENT)
Dept: VASCULAR SURGERY | Facility: CLINIC | Age: 74
End: 2024-05-09
Payer: MEDICARE

## 2024-05-09 ENCOUNTER — HOSPITAL ENCOUNTER (OUTPATIENT)
Dept: RADIOLOGY | Facility: HOSPITAL | Age: 74
Discharge: HOME | End: 2024-05-09
Attending: THORACIC SURGERY (CARDIOTHORACIC VASCULAR SURGERY)
Payer: MEDICARE

## 2024-05-09 VITALS
HEIGHT: 64 IN | DIASTOLIC BLOOD PRESSURE: 88 MMHG | WEIGHT: 183 LBS | RESPIRATION RATE: 20 BRPM | HEART RATE: 94 BPM | BODY MASS INDEX: 31.24 KG/M2 | SYSTOLIC BLOOD PRESSURE: 146 MMHG

## 2024-05-09 DIAGNOSIS — R60.9 EDEMA, UNSPECIFIED TYPE: ICD-10-CM

## 2024-05-09 DIAGNOSIS — Z95.2 HX OF MITRAL VALVE REPLACEMENT: Primary | ICD-10-CM

## 2024-05-09 DIAGNOSIS — Z95.4 HX OF MITRAL VALVE REPLACEMENT WITH TISSUE GRAFT: ICD-10-CM

## 2024-05-09 DIAGNOSIS — T81.30XA WOUND DEHISCENCE: ICD-10-CM

## 2024-05-09 PROCEDURE — 1126F AMNT PAIN NOTED NONE PRSNT: CPT | Mod: CPTII,S$GLB,, | Performed by: THORACIC SURGERY (CARDIOTHORACIC VASCULAR SURGERY)

## 2024-05-09 PROCEDURE — 3079F DIAST BP 80-89 MM HG: CPT | Mod: CPTII,S$GLB,, | Performed by: THORACIC SURGERY (CARDIOTHORACIC VASCULAR SURGERY)

## 2024-05-09 PROCEDURE — 3044F HG A1C LEVEL LT 7.0%: CPT | Mod: CPTII,S$GLB,, | Performed by: THORACIC SURGERY (CARDIOTHORACIC VASCULAR SURGERY)

## 2024-05-09 PROCEDURE — 99024 POSTOP FOLLOW-UP VISIT: CPT | Mod: S$GLB,,, | Performed by: THORACIC SURGERY (CARDIOTHORACIC VASCULAR SURGERY)

## 2024-05-09 PROCEDURE — 3077F SYST BP >= 140 MM HG: CPT | Mod: CPTII,S$GLB,, | Performed by: THORACIC SURGERY (CARDIOTHORACIC VASCULAR SURGERY)

## 2024-05-09 PROCEDURE — 99999 PR PBB SHADOW E&M-EST. PATIENT-LVL III: CPT | Mod: PBBFAC,,, | Performed by: THORACIC SURGERY (CARDIOTHORACIC VASCULAR SURGERY)

## 2024-05-09 PROCEDURE — 71046 X-RAY EXAM CHEST 2 VIEWS: CPT | Mod: TC,FY,PO

## 2024-05-09 PROCEDURE — 1159F MED LIST DOCD IN RCRD: CPT | Mod: CPTII,S$GLB,, | Performed by: THORACIC SURGERY (CARDIOTHORACIC VASCULAR SURGERY)

## 2024-05-09 PROCEDURE — 1101F PT FALLS ASSESS-DOCD LE1/YR: CPT | Mod: CPTII,S$GLB,, | Performed by: THORACIC SURGERY (CARDIOTHORACIC VASCULAR SURGERY)

## 2024-05-09 PROCEDURE — 71046 X-RAY EXAM CHEST 2 VIEWS: CPT | Mod: 26,,, | Performed by: RADIOLOGY

## 2024-05-09 PROCEDURE — 3288F FALL RISK ASSESSMENT DOCD: CPT | Mod: CPTII,S$GLB,, | Performed by: THORACIC SURGERY (CARDIOTHORACIC VASCULAR SURGERY)

## 2024-05-09 RX ORDER — FUROSEMIDE 40 MG/1
40 TABLET ORAL DAILY
Qty: 30 TABLET | Refills: 3 | Status: SHIPPED | OUTPATIENT
Start: 2024-05-09 | End: 2025-05-09

## 2024-05-09 NOTE — PROGRESS NOTES
5/9/2024...    The patient is status post mitral valve replacement (27 mm Medtronic Mosaic porcine bioprosthesis) with ligation of the left atrial appendage on 04/15/2024.    The patient visits the office today accompanied by her .    She looks terrific! She notices significant improvement in her exercise tolerance.  She has been compliant with Coumadin therapy.    On physical exam, there is a loose scab in the midportion of the sternotomy.  I removed the scab and probed the wound.  There is superficial separation.  I have packed it loosely with quarter-inch Nu gauze and instructed the patient to do this once or twice every day.  The remainder of the sternal wound is healing nicely.  The bone is solid.  Breath sounds are clear and equal bilaterally.    She has significant bilateral lower extremity pedal edema.  She was not discharged home with Lasix.  Today, I prescribed Lasix 40 mg daily with 3 refills.      CXR today is reviewed-it is completely clear.    Follow up in 2 weeks for a wound check and a lower extremity edema check.  Home health to continue PT/INR checks.    Additionally, I would like Home Health to do daily sternal dressing changes-remove current dressing, cleanse the area with warm soapy water, rinse with clear water, pat dry, repack the open wound with quarter-inch Nu Gauze-OK to use the back end of the Q-tip to secure the packing, cover with a Band-Aid.

## 2024-05-10 ENCOUNTER — DOCUMENT SCAN (OUTPATIENT)
Dept: HOME HEALTH SERVICES | Facility: HOSPITAL | Age: 74
End: 2024-05-10
Payer: MEDICARE

## 2024-05-14 ENCOUNTER — DOCUMENT SCAN (OUTPATIENT)
Dept: HOME HEALTH SERVICES | Facility: HOSPITAL | Age: 74
End: 2024-05-14
Payer: MEDICARE

## 2024-05-22 ENCOUNTER — OFFICE VISIT (OUTPATIENT)
Dept: CARDIOLOGY | Facility: CLINIC | Age: 74
End: 2024-05-22
Payer: MEDICARE

## 2024-05-22 VITALS
HEIGHT: 64 IN | DIASTOLIC BLOOD PRESSURE: 63 MMHG | HEART RATE: 90 BPM | BODY MASS INDEX: 29.99 KG/M2 | WEIGHT: 175.69 LBS | SYSTOLIC BLOOD PRESSURE: 138 MMHG

## 2024-05-22 DIAGNOSIS — I27.20 SEVERE PULMONARY HYPERTENSION: Chronic | ICD-10-CM

## 2024-05-22 DIAGNOSIS — R94.30 ELEVATED LEFT VENTRICULAR END-DIASTOLIC PRESSURE (LVEDP): Chronic | ICD-10-CM

## 2024-05-22 DIAGNOSIS — I05.9 MITRAL VALVE DISORDER: Primary | Chronic | ICD-10-CM

## 2024-05-22 DIAGNOSIS — Z79.01 LONG TERM (CURRENT) USE OF ANTICOAGULANTS: Chronic | ICD-10-CM

## 2024-05-22 DIAGNOSIS — I27.20 PULMONARY HYPERTENSION: ICD-10-CM

## 2024-05-22 DIAGNOSIS — Z95.4 HX OF MITRAL VALVE REPLACEMENT WITH TISSUE GRAFT: ICD-10-CM

## 2024-05-22 DIAGNOSIS — E66.3 OVERWEIGHT (BMI 25.0-29.9): ICD-10-CM

## 2024-05-22 PROCEDURE — 3288F FALL RISK ASSESSMENT DOCD: CPT | Mod: CPTII,S$GLB,, | Performed by: INTERNAL MEDICINE

## 2024-05-22 PROCEDURE — 3075F SYST BP GE 130 - 139MM HG: CPT | Mod: CPTII,S$GLB,, | Performed by: INTERNAL MEDICINE

## 2024-05-22 PROCEDURE — 1126F AMNT PAIN NOTED NONE PRSNT: CPT | Mod: CPTII,S$GLB,, | Performed by: INTERNAL MEDICINE

## 2024-05-22 PROCEDURE — 3044F HG A1C LEVEL LT 7.0%: CPT | Mod: CPTII,S$GLB,, | Performed by: INTERNAL MEDICINE

## 2024-05-22 PROCEDURE — 3078F DIAST BP <80 MM HG: CPT | Mod: CPTII,S$GLB,, | Performed by: INTERNAL MEDICINE

## 2024-05-22 PROCEDURE — 1159F MED LIST DOCD IN RCRD: CPT | Mod: CPTII,S$GLB,, | Performed by: INTERNAL MEDICINE

## 2024-05-22 PROCEDURE — 99214 OFFICE O/P EST MOD 30 MIN: CPT | Mod: S$GLB,,, | Performed by: INTERNAL MEDICINE

## 2024-05-22 PROCEDURE — 1101F PT FALLS ASSESS-DOCD LE1/YR: CPT | Mod: CPTII,S$GLB,, | Performed by: INTERNAL MEDICINE

## 2024-05-22 NOTE — PROGRESS NOTES
Subjective:    Patient ID:  Aleida Bliss is a 74 y.o. female who presents for Shortness of Breath, Valvular Heart Disease, and Coronary Artery Disease        Shortness of Breath  Associated symptoms include leg swelling. Pertinent negatives include no abdominal pain, chest pain, claudication, fever, headaches, hemoptysis, orthopnea, PND, syncope or wheezing. Her past medical history is significant for CAD.   Coronary Artery Disease  Symptoms include leg swelling. Pertinent negatives include no chest pain, palpitations or shortness of breath. Dizziness: OCC.      S/P MVR, DOING MUCH BETTER, MUCH LESS SHORTNESS OF BREATH NO CHEST PAIN SOME INCISIONAL DISCOMFORT NO TIA TYPE SYMPTOMS NO NEAR-SYNCOPE, SEE REVIEW OF SYSTEMS  Past Medical History:   Diagnosis Date    Arthritis     Coronary artery disease     GERD (gastroesophageal reflux disease)     Hypertension     Sleep apnea     uses C-PAP    SOB (shortness of breath)     Stroke     TIA approx 2021     Past Surgical History:   Procedure Laterality Date    CATARACT EXTRACTION EXTRACAPSULAR W/ INTRAOCULAR LENS IMPLANTATION      CATHETERIZATION OF BOTH LEFT AND RIGHT HEART N/A 02/15/2022    Procedure: CATHETERIZATION, HEART, BOTH LEFT AND RIGHT;  Surgeon: Cuong Hinkle MD;  Location: [facility] CATH;  Service: Cardiology;  Laterality: N/A;    CATHETERIZATION OF BOTH LEFT AND RIGHT HEART  2/22/2024    Procedure: Right / Left heart cath;  Surgeon: Cuong Hinkle MD;  Location: [facility] CATH;  Service: Cardiology;;    CHOLECYSTECTOMY      COLONOSCOPY      COLONOSCOPY N/A 3/15/2022    Procedure: COLONOSCOPY;  Surgeon: Varun Toro MD;  Location: [facility] ENDO;  Service: Endoscopy;  Laterality: N/A;    CORONARY ANGIOGRAPHY N/A 2/22/2024    Procedure: ANGIOGRAM, CORONARY ARTERY;  Surgeon: Cuong Hinkle MD;  Location: [facility] CATH;  Service: Cardiology;  Laterality: N/A;    EXCLUSION, LEFT ATRIAL APPENDAGE, OPEN, AS PART OF OPEN CHEST SURGERY N/A 4/15/2024    Procedure: EXCLUSION,  LEFT ATRIAL APPENDAGE, OPEN, AS PART OF OPEN CHEST SURGERY;  Surgeon: Angela Mcdowell MD;  Location: [facility] OR;  Service: Cardiothoracic;  Laterality: N/A;    HYSTERECTOMY      MITRAL VALVE REPLACEMENT N/A 4/15/2024    Procedure: REPLACEMENT, MITRAL VALVE;  Surgeon: Angela Mcdowell MD;  Location: [facility] OR;  Service: Cardiothoracic;  Laterality: N/A;    TRANSESOPHAGEAL ECHOCARDIOGRAPHY N/A 4/15/2024    Procedure: ECHOCARDIOGRAM, TRANSESOPHAGEAL;  Surgeon: Angela Mcdowell MD;  Location: [facility] OR;  Service: Cardiothoracic;  Laterality: N/A;     Family History   Problem Relation Name Age of Onset    Heart disease Mother      Cancer Father          prostate     Social History     Socioeconomic History    Marital status:    Tobacco Use    Smoking status: Never     Passive exposure: Never    Smokeless tobacco: Never   Substance and Sexual Activity    Alcohol use: No    Drug use: No    Sexual activity: Not Currently     Social Determinants of Health     Financial Resource Strain: Medium Risk (4/17/2019)    Received from [facility] (and [facility] [place], [place], and [place] prior to 7/1/2021), [facility] (and [facility] [place], [place], and [place] prior to 7/1/2021)    Financial Resource Strain     How hard is it for you to pay for the very basics like food, housing, medical care, and heating?: Somewhat hard   Food Insecurity: No Food Insecurity (4/16/2024)    Hunger Vital Sign     Worried About Running Out of Food in the Last Year: Never true     Ran Out of Food in the Last Year: Never true   Transportation Needs: Unknown (4/16/2024)    PRAPARE - Transportation     Lack of Transportation (Medical): No   Housing Stability: Low Risk  (4/16/2024)    Housing Stability Vital Sign     Unable to Pay for Housing in the Last Year: No     Homeless in the Last Year: No       Review of patient's allergies indicates:   Allergen Reactions    Thiazides Other (See Comments)     Elevates calcium     Lisinopril Other (See Comments)     Other reaction(s): Cough      Cyclobenzaprine Rash    Losartan Nausea Only     Other reaction(s): Unknown         Current Outpatient Medications:     atenoloL (TENORMIN) 50 MG tablet, Take 1 tablet (50 mg total) by mouth once daily., Disp: 90 tablet, Rfl: 3    ergocalciferol (ERGOCALCIFEROL) 50,000 unit Cap, Take 1 capsule (50,000 Units total) by mouth every Monday., Disp: 12 capsule, Rfl: 3    fluticasone propionate (FLONASE) 50 mcg/actuation nasal spray, USE 2 SPRAYS IN EACH NOSTRIL ONE TIME DAILY (Patient taking differently: 2 sprays by Each Nostril route daily as needed.), Disp: 48 g, Rfl: 11    furosemide (LASIX) 40 MG tablet, Take 1 tablet (40 mg total) by mouth once daily., Disp: 30 tablet, Rfl: 3    magnesium oxide (MAG-OX) 400 mg (241.3 mg magnesium) tablet, TAKE 1 TABLET EVERY DAY (Patient taking differently: Take 1 tablet by mouth once daily.), Disp: 90 tablet, Rfl: 3    omeprazole (PRILOSEC) 40 MG capsule, TAKE 1 CAPSULE (40 MG TOTAL) BY MOUTH ONCE DAILY., Disp: 90 capsule, Rfl: 3    oxyCODONE-acetaminophen (PERCOCET) 5-325 mg per tablet, Take 1 tablet by mouth every 6 (six) hours as needed for Pain., Disp: 28 tablet, Rfl: 0    pravastatin (PRAVACHOL) 40 MG tablet, Take 1 tablet (40 mg total) by mouth every evening., Disp: 90 tablet, Rfl: 1    sildenafil (REVATIO) 20 mg Tab, Take 1 tablet (20 mg total) by mouth 3 (three) times daily., Disp: 270 tablet, Rfl: 3    sucralfate (CARAFATE) 1 gram tablet, Take 1 g by mouth every evening., Disp: , Rfl:     tiZANidine (ZANAFLEX) 4 MG tablet, Take 1 tablet (4 mg total) by mouth nightly as needed (muscle spasms/ pain)., Disp: 40 tablet, Rfl: 0    triamcinolone acetonide 0.1% (KENALOG) 0.1 % cream, APPLY TOPICALLY 2 (TWO) TIMES DAILY., Disp: 80 g, Rfl: 11    warfarin (COUMADIN) 1 MG tablet, Take one tablet by mouth at 1700 every Mon, Wed, Fri.  Take two tablets by mouth at 1700 every Tues, Thurs, Sat, Sun.  Dose adjustments will  "be made every Monday and Thursday., Disp: 200 tablet, Rfl: 6    amoxicillin-clavulanate 875-125mg (AUGMENTIN) 875-125 mg per tablet, Take iii tablets by mouth one hour prior to any dental intervention., Disp: 6 tablet, Rfl: 11    Review of Systems   Constitutional: Negative for chills, decreased appetite, diaphoresis, fever, malaise/fatigue and night sweats.   HENT:  Negative for congestion and nosebleeds.    Eyes:  Negative for blurred vision and visual disturbance.   Cardiovascular:  Positive for leg swelling. Negative for chest pain, claudication, cyanosis, dyspnea on exertion (MILD), irregular heartbeat, near-syncope, orthopnea, palpitations, paroxysmal nocturnal dyspnea and syncope.   Respiratory:  Negative for cough, hemoptysis, shortness of breath and wheezing.    Endocrine: Negative for polyuria.   Hematologic/Lymphatic: Negative for adenopathy. Does not bruise/bleed easily.   Skin:  Negative for color change and itching.   Musculoskeletal:  Positive for arthritis. Negative for back pain and falls.   Gastrointestinal:  Negative for abdominal pain, change in bowel habit, jaundice, melena and nausea.   Genitourinary:  Negative for dysuria and flank pain.   Neurological:  Negative for brief paralysis, focal weakness, headaches, light-headedness, loss of balance and weakness. Dizziness: OCC.  Psychiatric/Behavioral:  Negative for altered mental status and depression.         Objective:      Vitals:    05/22/24 1352   BP: 138/63   Pulse: 90   Weight: 79.7 kg (175 lb 11.3 oz)   Height: 5' 4" (1.626 m)   PainSc: 0-No pain     Body mass index is 30.16 kg/m².    Physical Exam  Constitutional:       Appearance: Normal appearance. She is overweight.   HENT:      Head: Normocephalic and atraumatic.   Eyes:      Extraocular Movements: Extraocular movements intact.      Pupils: Pupils are equal, round, and reactive to light.   Neck:      Thyroid: No thyromegaly.      Vascular: Normal carotid pulses. No carotid bruit, " hepatojugular reflux or JVD.      Trachea: Trachea normal. No tracheal deviation.   Cardiovascular:      Rate and Rhythm: Normal rate and regular rhythm. No extrasystoles are present.     Pulses:           Carotid pulses are 2+ on the right side and 2+ on the left side.       Radial pulses are 2+ on the right side and 2+ on the left side.        Posterior tibial pulses are 2+ on the right side and 2+ on the left side.      Heart sounds: Murmur heard.      Systolic murmur is present with a grade of 1/6 at the upper right sternal border.      No friction rub. No gallop. No S4 sounds.   Pulmonary:      Effort: Pulmonary effort is normal. No respiratory distress.      Breath sounds: Normal breath sounds. No rales.   Chest:       Abdominal:      General: Bowel sounds are normal.      Palpations: Abdomen is soft.      Tenderness: There is no abdominal tenderness.   Musculoskeletal:         General: Normal range of motion.      Cervical back: Neck supple. No edema or erythema.      Right lower leg: No edema.      Left lower leg: No edema.   Skin:     General: Skin is warm and dry.      Capillary Refill: Capillary refill takes less than 2 seconds.   Neurological:      General: No focal deficit present.      Mental Status: She is alert and oriented to person, place, and time.      Cranial Nerves: No cranial nerve deficit.   Psychiatric:         Mood and Affect: Mood normal.         Speech: Speech normal.         Behavior: Behavior normal.               ..    Chemistry        Component Value Date/Time     04/19/2024 0444    K 3.8 04/19/2024 0444     04/19/2024 0444    CO2 27 04/19/2024 0444    BUN 20 (H) 04/19/2024 0444    CREATININE 0.83 04/19/2024 0444     04/19/2024 0444        Component Value Date/Time    CALCIUM 10.7 (H) 04/19/2024 0444    ALKPHOS 123 04/19/2024 0444    AST 41 (H) 04/19/2024 0444    ALT 28 04/19/2024 0444    BILITOT 0.8 04/19/2024 0444    ESTGFRAFRICA >60 02/15/2022 0818    EGFRNONAA  >60 02/15/2022 0818            ..  Lab Results   Component Value Date    CHOL 183 04/04/2024    CHOL 170 08/12/2022    CHOL 166 12/15/2021     Lab Results   Component Value Date    HDL 47 04/04/2024    HDL 52 08/12/2022    HDL 51 12/15/2021     Lab Results   Component Value Date    LDLCALC 119.6 04/04/2024    LDLCALC 104.2 08/12/2022    LDLCALC 101 (H) 12/15/2021     Lab Results   Component Value Date    TRIG 82 04/04/2024    TRIG 69 08/12/2022    TRIG 76 12/15/2021     Lab Results   Component Value Date    CHOLHDL 25.7 04/04/2024    CHOLHDL 30.6 08/12/2022     ..  Lab Results   Component Value Date    WBC 14.73 (H) 04/19/2024    HGB 9.6 (L) 04/19/2024    HCT 29.3 (L) 04/19/2024    MCV 90 04/19/2024     (L) 04/19/2024       Test(s) Reviewed  I have reviewed the following in detail:  [] Stress test   [] Angiography   [] Echocardiogram   [x] Labs   [x] Other:       Assessment:         ICD-10-CM ICD-9-CM   1. Mitral valve disorder  I05.9 394.9   2. Severe pulmonary hypertension  I27.20 416.8   3. Elevated left ventricular end-diastolic pressure (LVEDP)  R94.30 794.30   4. Pulmonary hypertension  I27.20 416.8   5. Hx of mitral valve replacement with tissue graft  Z95.4 V42.2   6. Long term (current) use of anticoagulants  Z79.01 V58.61   7. Overweight (BMI 25.0-29.9)  E66.3 278.02     Problem List Items Addressed This Visit          Cardiac/Vascular    Mitral valve disorder - Primary    Relevant Orders    Comprehensive Metabolic Panel    Severe pulmonary hypertension    Elevated left ventricular end-diastolic pressure (LVEDP)    Relevant Orders    Comprehensive Metabolic Panel    BNP    Pulmonary hypertension    Hx of mitral valve replacement with tissue graft    Relevant Orders    Cardiac Rehab Phase II       Hematology    Long term (current) use of anticoagulants    Relevant Orders    Comprehensive Metabolic Panel       Endocrine    Overweight (BMI 25.0-29.9)        Plan:         START CARDIAC REHAB, CHECK  LABS.N, OVERALL DOING MUCH BETTER POST MITRAL VALVE REPAIR ALL CV CLINICALLY STABLE, NO ANGINA, NO HF, NO TIA, NO CLINICAL ARRHYTHMIA,CONTINUE CURRENT MEDS, EDUCATION, DIET, EXERCISE, WALKING EXERCISE AS MUCH AS POSSIBLE, RETURN TO CLINIC IN FEW MONTHS DISCUSSED PLAN WITH THE PATIENT AND HER    Mitral valve disorder  -     Comprehensive Metabolic Panel; Future; Expected date: 08/22/2024    Severe pulmonary hypertension    Elevated left ventricular end-diastolic pressure (LVEDP)  -     Comprehensive Metabolic Panel; Future; Expected date: 08/22/2024  -     BNP; Future; Expected date: 08/22/2024    Pulmonary hypertension    Hx of mitral valve replacement with tissue graft  -     Cardiac Rehab Phase II; Future    Long term (current) use of anticoagulants  -     Comprehensive Metabolic Panel; Future; Expected date: 08/22/2024    Overweight (BMI 25.0-29.9)    RTC Low level/low impact aerobic exercise 5x's/wk. Heart healthy diet and risk factor modification.    See labs and med orders.    Aerobic exercise 5x's/wk. Heart healthy diet and risk factor modification.    See labs and med orders.

## 2024-05-28 ENCOUNTER — OFFICE VISIT (OUTPATIENT)
Dept: PRIMARY CARE CLINIC | Facility: CLINIC | Age: 74
End: 2024-05-28
Payer: MEDICARE

## 2024-05-28 VITALS
OXYGEN SATURATION: 96 % | HEIGHT: 64 IN | SYSTOLIC BLOOD PRESSURE: 126 MMHG | DIASTOLIC BLOOD PRESSURE: 84 MMHG | BODY MASS INDEX: 29.77 KG/M2 | HEART RATE: 90 BPM | WEIGHT: 174.38 LBS

## 2024-05-28 DIAGNOSIS — Z51.89 ENCOUNTER FOR CARDIAC REHABILITATION: ICD-10-CM

## 2024-05-28 DIAGNOSIS — I10 PRIMARY HYPERTENSION: Primary | ICD-10-CM

## 2024-05-28 DIAGNOSIS — Z95.2 MITRAL VALVE REPLACED: ICD-10-CM

## 2024-05-28 DIAGNOSIS — D69.6 THROMBOCYTOPENIA, UNSPECIFIED: ICD-10-CM

## 2024-05-28 LAB
ALBUMIN SERPL BCP-MCNC: 3.4 G/DL (ref 3.5–5.2)
ALP SERPL-CCNC: 110 U/L (ref 55–135)
ALT SERPL W/O P-5'-P-CCNC: 13 U/L (ref 10–44)
ANION GAP SERPL CALC-SCNC: 8 MMOL/L (ref 8–16)
AST SERPL-CCNC: 22 U/L (ref 10–40)
BASOPHILS # BLD AUTO: 0.09 K/UL (ref 0–0.2)
BASOPHILS NFR BLD: 1.6 % (ref 0–1.9)
BILIRUB SERPL-MCNC: 0.4 MG/DL (ref 0.1–1)
BUN SERPL-MCNC: 11 MG/DL (ref 8–23)
CALCIUM SERPL-MCNC: 11 MG/DL (ref 8.7–10.5)
CHLORIDE SERPL-SCNC: 107 MMOL/L (ref 95–110)
CO2 SERPL-SCNC: 25 MMOL/L (ref 23–29)
CREAT SERPL-MCNC: 1 MG/DL (ref 0.5–1.4)
DIFFERENTIAL METHOD BLD: ABNORMAL
EOSINOPHIL # BLD AUTO: 0.2 K/UL (ref 0–0.5)
EOSINOPHIL NFR BLD: 2.6 % (ref 0–8)
ERYTHROCYTE [DISTWIDTH] IN BLOOD BY AUTOMATED COUNT: 15 % (ref 11.5–14.5)
EST. GFR  (NO RACE VARIABLE): 58.8 ML/MIN/1.73 M^2
FERRITIN SERPL-MCNC: 24 NG/ML (ref 20–300)
GLUCOSE SERPL-MCNC: 100 MG/DL (ref 70–110)
HCT VFR BLD AUTO: 33.1 % (ref 37–48.5)
HGB BLD-MCNC: 10 G/DL (ref 12–16)
IMM GRANULOCYTES # BLD AUTO: 0.01 K/UL (ref 0–0.04)
IMM GRANULOCYTES NFR BLD AUTO: 0.2 % (ref 0–0.5)
IRON SERPL-MCNC: 20 UG/DL (ref 30–160)
LYMPHOCYTES # BLD AUTO: 1.8 K/UL (ref 1–4.8)
LYMPHOCYTES NFR BLD: 30.8 % (ref 18–48)
MCH RBC QN AUTO: 26.8 PG (ref 27–31)
MCHC RBC AUTO-ENTMCNC: 30.2 G/DL (ref 32–36)
MCV RBC AUTO: 89 FL (ref 82–98)
MONOCYTES # BLD AUTO: 1 K/UL (ref 0.3–1)
MONOCYTES NFR BLD: 17.5 % (ref 4–15)
NEUTROPHILS # BLD AUTO: 2.7 K/UL (ref 1.8–7.7)
NEUTROPHILS NFR BLD: 47.3 % (ref 38–73)
NRBC BLD-RTO: 0 /100 WBC
PLATELET # BLD AUTO: 359 K/UL (ref 150–450)
PMV BLD AUTO: 11.2 FL (ref 9.2–12.9)
POTASSIUM SERPL-SCNC: 4.4 MMOL/L (ref 3.5–5.1)
PROT SERPL-MCNC: 7.6 G/DL (ref 6–8.4)
RBC # BLD AUTO: 3.73 M/UL (ref 4–5.4)
SATURATED IRON: 5 % (ref 20–50)
SODIUM SERPL-SCNC: 140 MMOL/L (ref 136–145)
TOTAL IRON BINDING CAPACITY: 425 UG/DL (ref 250–450)
TRANSFERRIN SERPL-MCNC: 287 MG/DL (ref 200–375)
WBC # BLD AUTO: 5.71 K/UL (ref 3.9–12.7)

## 2024-05-28 PROCEDURE — 83540 ASSAY OF IRON: CPT | Performed by: PHYSICIAN ASSISTANT

## 2024-05-28 PROCEDURE — 3288F FALL RISK ASSESSMENT DOCD: CPT | Mod: CPTII,S$GLB,, | Performed by: PHYSICIAN ASSISTANT

## 2024-05-28 PROCEDURE — 85025 COMPLETE CBC W/AUTO DIFF WBC: CPT | Performed by: PHYSICIAN ASSISTANT

## 2024-05-28 PROCEDURE — 1101F PT FALLS ASSESS-DOCD LE1/YR: CPT | Mod: CPTII,S$GLB,, | Performed by: PHYSICIAN ASSISTANT

## 2024-05-28 PROCEDURE — 3074F SYST BP LT 130 MM HG: CPT | Mod: CPTII,S$GLB,, | Performed by: PHYSICIAN ASSISTANT

## 2024-05-28 PROCEDURE — 3044F HG A1C LEVEL LT 7.0%: CPT | Mod: CPTII,S$GLB,, | Performed by: PHYSICIAN ASSISTANT

## 2024-05-28 PROCEDURE — 1159F MED LIST DOCD IN RCRD: CPT | Mod: CPTII,S$GLB,, | Performed by: PHYSICIAN ASSISTANT

## 2024-05-28 PROCEDURE — 36415 COLL VENOUS BLD VENIPUNCTURE: CPT | Mod: S$GLB,,, | Performed by: PHYSICIAN ASSISTANT

## 2024-05-28 PROCEDURE — 82728 ASSAY OF FERRITIN: CPT | Performed by: PHYSICIAN ASSISTANT

## 2024-05-28 PROCEDURE — 1125F AMNT PAIN NOTED PAIN PRSNT: CPT | Mod: CPTII,S$GLB,, | Performed by: PHYSICIAN ASSISTANT

## 2024-05-28 PROCEDURE — 3079F DIAST BP 80-89 MM HG: CPT | Mod: CPTII,S$GLB,, | Performed by: PHYSICIAN ASSISTANT

## 2024-05-28 PROCEDURE — 80053 COMPREHEN METABOLIC PANEL: CPT | Performed by: PHYSICIAN ASSISTANT

## 2024-05-28 PROCEDURE — 99214 OFFICE O/P EST MOD 30 MIN: CPT | Mod: S$GLB,,, | Performed by: PHYSICIAN ASSISTANT

## 2024-05-28 NOTE — PROGRESS NOTES
Subjective     Patient ID: Aleida Bliss is a 74 y.o. female.    Chief Complaint: Hospital Follow Up    Patient is a 73 y/o F who presents S/P mitral valve replacement on 4/15/24 doing well. Her exercise tolerance is better. She has been walking more. No chest pain, dyspnea, shortness of breath, cough or lightheadedness. She has some painful edema in bilateral LE for which she is taking lasix. She reports a stitch came loose a couple of weeks after her surgery, but it has since been healing without intervention. She is receiving wound care and INR checks with home health. She is scheduled to follow up with her surgeon next week. Her cardiologist is encouraging her to pursue PT but she is hesitant due to the distance to the PT office.       Review of Systems   Constitutional: Negative.  Negative for appetite change, fatigue, fever and unexpected weight change.   HENT: Negative.  Negative for nasal congestion, rhinorrhea, sinus pressure/congestion and sore throat.    Respiratory: Negative.  Negative for cough, chest tightness and shortness of breath.    Cardiovascular:  Positive for leg swelling. Negative for chest pain and palpitations.   Gastrointestinal:  Negative for abdominal distention, abdominal pain, change in bowel habit, constipation, diarrhea, nausea and vomiting.   Genitourinary:  Negative for dysuria, frequency, nocturia and urgency.   Musculoskeletal:  Positive for leg pain.   Neurological:  Positive for weakness and numbness. Negative for dizziness.        Left 4th & 5th fingers   Psychiatric/Behavioral: Negative.         Past Medical History:   Diagnosis Date    Arthritis     Coronary artery disease     GERD (gastroesophageal reflux disease)     Hypertension     Sleep apnea     uses C-PAP    SOB (shortness of breath)     Stroke     TIA approx 2021        Current Outpatient Medications:     amoxicillin-clavulanate 875-125mg (AUGMENTIN) 875-125 mg per tablet, Take iii tablets by mouth one hour prior to  any dental intervention., Disp: 6 tablet, Rfl: 11    atenoloL (TENORMIN) 50 MG tablet, Take 1 tablet (50 mg total) by mouth once daily., Disp: 90 tablet, Rfl: 3    ergocalciferol (ERGOCALCIFEROL) 50,000 unit Cap, Take 1 capsule (50,000 Units total) by mouth every Monday., Disp: 12 capsule, Rfl: 3    fluticasone propionate (FLONASE) 50 mcg/actuation nasal spray, USE 2 SPRAYS IN EACH NOSTRIL ONE TIME DAILY (Patient taking differently: 2 sprays by Each Nostril route daily as needed.), Disp: 48 g, Rfl: 11    furosemide (LASIX) 40 MG tablet, Take 1 tablet (40 mg total) by mouth once daily., Disp: 30 tablet, Rfl: 3    magnesium oxide (MAG-OX) 400 mg (241.3 mg magnesium) tablet, TAKE 1 TABLET EVERY DAY (Patient taking differently: Take 1 tablet by mouth once daily.), Disp: 90 tablet, Rfl: 3    omeprazole (PRILOSEC) 40 MG capsule, TAKE 1 CAPSULE (40 MG TOTAL) BY MOUTH ONCE DAILY., Disp: 90 capsule, Rfl: 3    oxyCODONE-acetaminophen (PERCOCET) 5-325 mg per tablet, Take 1 tablet by mouth every 6 (six) hours as needed for Pain., Disp: 28 tablet, Rfl: 0    pravastatin (PRAVACHOL) 40 MG tablet, Take 1 tablet (40 mg total) by mouth every evening., Disp: 90 tablet, Rfl: 1    sildenafil (REVATIO) 20 mg Tab, Take 1 tablet (20 mg total) by mouth 3 (three) times daily., Disp: 270 tablet, Rfl: 3    sucralfate (CARAFATE) 1 gram tablet, Take 1 g by mouth every evening., Disp: , Rfl:     tiZANidine (ZANAFLEX) 4 MG tablet, Take 1 tablet (4 mg total) by mouth nightly as needed (muscle spasms/ pain)., Disp: 40 tablet, Rfl: 0    triamcinolone acetonide 0.1% (KENALOG) 0.1 % cream, APPLY TOPICALLY 2 (TWO) TIMES DAILY., Disp: 80 g, Rfl: 11    warfarin (COUMADIN) 1 MG tablet, Take one tablet by mouth at 1700 every Mon, Wed, Fri.  Take two tablets by mouth at 1700 every Tues, Thurs, Sat, Sun.  Dose adjustments will be made every Monday and Thursday., Disp: 200 tablet, Rfl: 6          Objective     Physical Exam  Vitals reviewed.   Constitutional:        General: She is not in acute distress.     Appearance: Normal appearance. She is not ill-appearing.   HENT:      Head: Normocephalic and atraumatic.      Nose: Nose normal. No congestion or rhinorrhea.      Mouth/Throat:      Mouth: Mucous membranes are moist.      Pharynx: Oropharynx is clear. No oropharyngeal exudate or posterior oropharyngeal erythema.   Eyes:      Extraocular Movements: Extraocular movements intact.      Conjunctiva/sclera: Conjunctivae normal.      Pupils: Pupils are equal, round, and reactive to light.   Neck:      Vascular: No carotid bruit.   Cardiovascular:      Rate and Rhythm: Normal rate and regular rhythm.      Pulses: Normal pulses.      Heart sounds: Normal heart sounds. No murmur heard.     No friction rub. No gallop.   Pulmonary:      Effort: Pulmonary effort is normal.      Breath sounds: Normal breath sounds.   Abdominal:      General: Bowel sounds are normal. There is no distension.      Palpations: Abdomen is soft.      Tenderness: There is no abdominal tenderness.   Musculoskeletal:         General: Swelling and tenderness present.      Right hand: Normal.      Left hand: Decreased strength.      Cervical back: Neck supple.      Right lower leg: Edema present.      Left lower leg: Edema present.   Lymphadenopathy:      Cervical: No cervical adenopathy.   Skin:     General: Skin is warm and dry.      Capillary Refill: Capillary refill takes less than 2 seconds.   Neurological:      General: No focal deficit present.      Mental Status: She is alert and oriented to person, place, and time. Mental status is at baseline.   Psychiatric:         Mood and Affect: Mood normal.         Behavior: Behavior normal.         Thought Content: Thought content normal.         Judgment: Judgment normal.          Assessment and Plan     1. Primary hypertension  -     Comprehensive Metabolic Panel; Future; Expected date: 05/28/2024    2. Thrombocytopenia, unspecified  -     CBC Auto  Differential; Future; Expected date: 05/28/2024  -     Ferritin; Future; Expected date: 05/28/2024  -     Iron and TIBC; Future; Expected date: 05/28/2024    3. Mitral valve replaced  -     Ambulatory referral/consult to Physical/Occupational Therapy; Future; Expected date: 06/04/2024    4. Encounter for cardiac rehabilitation  -     Ambulatory referral/consult to Physical/Occupational Therapy; Future; Expected date: 06/04/2024        I spent 30 minutes on this encounter, time includes face-to-face, chart review, documentation, test review and orders.

## 2024-05-30 ENCOUNTER — OFFICE VISIT (OUTPATIENT)
Dept: VASCULAR SURGERY | Facility: CLINIC | Age: 74
End: 2024-05-30
Payer: MEDICARE

## 2024-05-30 VITALS
DIASTOLIC BLOOD PRESSURE: 76 MMHG | WEIGHT: 173.31 LBS | HEIGHT: 64 IN | BODY MASS INDEX: 29.59 KG/M2 | HEART RATE: 88 BPM | SYSTOLIC BLOOD PRESSURE: 128 MMHG

## 2024-05-30 DIAGNOSIS — Z95.2 HX OF MITRAL VALVE REPLACEMENT: Primary | ICD-10-CM

## 2024-05-30 PROCEDURE — 3074F SYST BP LT 130 MM HG: CPT | Mod: CPTII,S$GLB,, | Performed by: THORACIC SURGERY (CARDIOTHORACIC VASCULAR SURGERY)

## 2024-05-30 PROCEDURE — 99999 PR PBB SHADOW E&M-EST. PATIENT-LVL III: CPT | Mod: PBBFAC,,, | Performed by: THORACIC SURGERY (CARDIOTHORACIC VASCULAR SURGERY)

## 2024-05-30 PROCEDURE — 3078F DIAST BP <80 MM HG: CPT | Mod: CPTII,S$GLB,, | Performed by: THORACIC SURGERY (CARDIOTHORACIC VASCULAR SURGERY)

## 2024-05-30 PROCEDURE — 99024 POSTOP FOLLOW-UP VISIT: CPT | Mod: S$GLB,,, | Performed by: THORACIC SURGERY (CARDIOTHORACIC VASCULAR SURGERY)

## 2024-05-30 PROCEDURE — 1159F MED LIST DOCD IN RCRD: CPT | Mod: CPTII,S$GLB,, | Performed by: THORACIC SURGERY (CARDIOTHORACIC VASCULAR SURGERY)

## 2024-05-30 PROCEDURE — 3044F HG A1C LEVEL LT 7.0%: CPT | Mod: CPTII,S$GLB,, | Performed by: THORACIC SURGERY (CARDIOTHORACIC VASCULAR SURGERY)

## 2024-05-30 PROCEDURE — 1126F AMNT PAIN NOTED NONE PRSNT: CPT | Mod: CPTII,S$GLB,, | Performed by: THORACIC SURGERY (CARDIOTHORACIC VASCULAR SURGERY)

## 2024-05-30 NOTE — PROGRESS NOTES
5/30/2024...    The patient is status post mitral valve replacement and ligation of the left atrial appendage 04/15/2024.  I saw her 05/09/2024 for a scab on her sternotomy wound.    We began packing the wound with Nu Gauze.  I also initiated Lasix therapy for lower extremity edema.    The patient visits the office today accompanied by her .    She looks terrific! She has minimal pedal edema that she says is improving every day.  She is slowly returning to her baseline activity level.  I have recommended cardiac rehab for her.    On physical exam, breath sounds are clear and equal bilaterally.  The sternotomy wound is healed.  In the mid portion, there is a loose scab.  Beneath the scab, there is good healing tissue.  The bone is solid.  She has minimal bilateral pedal edema.    The patient has been compliant with her Coumadin therapy.  Maintaining an adequate INR has been difficult.  She has been prescribed Lovenox recently by the [facility].    I would like to see the patient for a final checkup in 6-8 weeks.    She will need an ECHO prior to that visit   We may be able to stop the Coumadin at that time.

## 2024-06-04 ENCOUNTER — DOCUMENT SCAN (OUTPATIENT)
Dept: HOME HEALTH SERVICES | Facility: HOSPITAL | Age: 74
End: 2024-06-04
Payer: MEDICARE

## 2024-06-05 ENCOUNTER — TELEPHONE (OUTPATIENT)
Dept: FAMILY MEDICINE | Facility: CLINIC | Age: 74
End: 2024-06-05
Payer: MEDICARE

## 2024-06-05 NOTE — TELEPHONE ENCOUNTER
Notified Ms. Bliss per Dr. Montiel    - Message from Sebastian Montiel III, PA-C sent at 5/30/2024  6:43 AM CDT -----  Aleida Izaiah     I reviewed your lab work. Your calcium is a little elevated and the Iron level is low. I recommend an OTC iron supplement and repeat the lab work in a few months         Pt VU.   No

## 2024-06-05 NOTE — TELEPHONE ENCOUNTER
----- Message from Sebastian Montiel III, PA-C sent at 5/30/2024  6:43 AM CDT -----  Aleida Bliss    I reviewed your lab work. Your calcium is a little elevated and the Iron level is low. I recommend an OTC iron supplement and repeat the lab work in a few months

## 2024-06-07 RX ORDER — MONTELUKAST SODIUM 10 MG/1
10 TABLET ORAL NIGHTLY
Qty: 90 TABLET | Refills: 3 | Status: SHIPPED | OUTPATIENT
Start: 2024-06-07

## 2024-06-11 ENCOUNTER — DOCUMENT SCAN (OUTPATIENT)
Dept: HOME HEALTH SERVICES | Facility: HOSPITAL | Age: 74
End: 2024-06-11
Payer: MEDICARE

## 2024-06-14 ENCOUNTER — EXTERNAL HOME HEALTH (OUTPATIENT)
Dept: HOME HEALTH SERVICES | Facility: HOSPITAL | Age: 74
End: 2024-06-14
Payer: MEDICARE

## 2024-06-17 ENCOUNTER — TELEPHONE (OUTPATIENT)
Dept: HEMATOLOGY/ONCOLOGY | Facility: CLINIC | Age: 74
End: 2024-06-17
Payer: MEDICARE

## 2024-06-17 NOTE — TELEPHONE ENCOUNTER
Spoke with pt in regards to getting hosp f/u for DVT. Scheduled pt for first available. Pt accepted appt and VU of date/time/location.

## 2024-06-17 NOTE — TELEPHONE ENCOUNTER
----- Message from Ginna Brown RN sent at 6/17/2024  8:43 AM CDT -----  Regarding: [facility] ER visit f/u 6/15/2024 Dx: Acute DVT LUE  Please call patient to schedule an appointment for further evaluation and treatment of Acute DVT in left upper extremity.     Thanks,    Ginna Brown RN, BSN  ED Navigator/Case Management  [phone]

## 2024-06-21 ENCOUNTER — DOCUMENT SCAN (OUTPATIENT)
Dept: HOME HEALTH SERVICES | Facility: HOSPITAL | Age: 74
End: 2024-06-21
Payer: MEDICARE

## 2024-06-24 DIAGNOSIS — E55.9 VITAMIN D DEFICIENCY DISEASE: ICD-10-CM

## 2024-06-24 RX ORDER — ERGOCALCIFEROL 1.25 MG/1
CAPSULE ORAL
Qty: 12 CAPSULE | Refills: 3 | Status: SHIPPED | OUTPATIENT
Start: 2024-06-24

## 2024-06-28 ENCOUNTER — DOCUMENT SCAN (OUTPATIENT)
Dept: HOME HEALTH SERVICES | Facility: HOSPITAL | Age: 74
End: 2024-06-28
Payer: MEDICARE

## 2024-07-10 ENCOUNTER — HOSPITAL ENCOUNTER (OUTPATIENT)
Dept: CARDIOLOGY | Facility: HOSPITAL | Age: 74
Discharge: HOME | End: 2024-07-10
Attending: THORACIC SURGERY (CARDIOTHORACIC VASCULAR SURGERY)
Payer: MEDICARE

## 2024-07-10 VITALS — HEIGHT: 64 IN | WEIGHT: 172 LBS | BODY MASS INDEX: 29.37 KG/M2

## 2024-07-10 DIAGNOSIS — Z95.2 HX OF MITRAL VALVE REPLACEMENT: ICD-10-CM

## 2024-07-10 LAB
ASCENDING AORTA: 2.66 CM
AV INDEX (PROSTH): 0.75
AV MEAN GRADIENT: 8 MMHG
AV PEAK GRADIENT: 13 MMHG
AV VALVE AREA BY VELOCITY RATIO: 2.2 CM²
AV VALVE AREA: 2.06 CM²
AV VELOCITY RATIO: 0.8
BSA FOR ECHO PROCEDURE: 1.88 M2
CV ECHO LV RWT: 0.48 CM
DOP CALC AO PEAK VEL: 1.81 M/S
DOP CALC AO VTI: 39.1 CM
DOP CALC LVOT AREA: 2.7 CM2
DOP CALC LVOT DIAMETER: 1.87 CM
DOP CALC LVOT PEAK VEL: 1.45 M/S
DOP CALC LVOT STROKE VOLUME: 80.43 CM3
DOP CALC MV VTI: 53 CM
DOP CALCLVOT PEAK VEL VTI: 29.3 CM
E WAVE DECELERATION TIME: 183.75 MSEC
E/A RATIO: 1.35
ECHO LV POSTERIOR WALL: 1.09 CM (ref 0.6–1.1)
EJECTION FRACTION: 55 %
FRACTIONAL SHORTENING: 27 % (ref 28–44)
INTERVENTRICULAR SEPTUM: 0.9 CM (ref 0.6–1.1)
LEFT ATRIUM AREA SYSTOLIC (APICAL 2 CHAMBER): 28.7 CM2
LEFT ATRIUM AREA SYSTOLIC (APICAL 4 CHAMBER): 29.21 CM2
LEFT ATRIUM VOLUME INDEX MOD: 58.1 ML/M2
LEFT ATRIUM VOLUME MOD: 106.33 CM3
LEFT INTERNAL DIMENSION IN SYSTOLE: 3.31 CM (ref 2.1–4)
LEFT VENTRICLE DIASTOLIC VOLUME INDEX: 51.95 ML/M2
LEFT VENTRICLE DIASTOLIC VOLUME: 95.06 ML
LEFT VENTRICLE END SYSTOLIC VOLUME APICAL 2 CHAMBER: 102.31 ML
LEFT VENTRICLE END SYSTOLIC VOLUME APICAL 4 CHAMBER: 108.91 ML
LEFT VENTRICLE MASS INDEX: 85 G/M2
LEFT VENTRICLE SYSTOLIC VOLUME INDEX: 24.3 ML/M2
LEFT VENTRICLE SYSTOLIC VOLUME: 44.39 ML
LEFT VENTRICULAR INTERNAL DIMENSION IN DIASTOLE: 4.55 CM (ref 3.5–6)
LEFT VENTRICULAR MASS: 154.96 G
LVED V (TEICH): 95.06 ML
LVES V (TEICH): 44.39 ML
LVOT MG: 4.51 MMHG
LVOT MV: 1.01 CM/S
MV MEAN GRADIENT: 9 MMHG
MV PEAK A VEL: 1.56 M/S
MV PEAK E VEL: 2.11 M/S
MV PEAK GRADIENT: 20 MMHG
MV STENOSIS PRESSURE HALF TIME: 53.87 MS
MV VALVE AREA BY CONTINUITY EQUATION: 1.52 CM2
MV VALVE AREA P 1/2 METHOD: 4.08 CM2
OHS CV RV/LV RATIO: 0.91 CM
PISA TR MAX VEL: 2.93 M/S
RA PRESSURE ESTIMATED: 8 MMHG
RA VOL SYS: 38.65 ML
RIGHT ATRIAL AREA: 14.9 CM2
RIGHT ATRIUM VOLUME AREA LENGTH APICAL 4 CHAMBER: 38.2 ML
RIGHT VENTRICLE DIASTOLIC LENGTH: 6.8 CM
RIGHT VENTRICLE DIASTOLIC MID DIMENSION: 3.5 CM
RIGHT VENTRICULAR END-DIASTOLIC DIMENSION: 4.16 CM
RIGHT VENTRICULAR LENGTH IN DIASTOLE (APICAL 4-CHAMBER VIEW): 6.81 CM
RV MID DIAMA: 3.52 CM
RV TB RVSP: 11 MMHG
RV TISSUE DOPPLER FREE WALL SYSTOLIC VELOCITY 1 (APICAL 4 CHAMBER VIEW): 9.51 CM/S
SINUS: 2.65 CM
STJ: 2.38 CM
TR MAX PG: 34 MMHG
TRICUSPID ANNULAR PLANE SYSTOLIC EXCURSION: 1.78 CM
TV REST PULMONARY ARTERY PRESSURE: 42 MMHG
Z-SCORE OF LEFT VENTRICULAR DIMENSION IN END DIASTOLE: -1.08
Z-SCORE OF LEFT VENTRICULAR DIMENSION IN END SYSTOLE: 0.44

## 2024-07-10 PROCEDURE — 93306 TTE W/DOPPLER COMPLETE: CPT | Mod: PO

## 2024-07-10 PROCEDURE — 93306 TTE W/DOPPLER COMPLETE: CPT | Mod: 26,,, | Performed by: INTERNAL MEDICINE

## 2024-07-16 ENCOUNTER — DOCUMENT SCAN (OUTPATIENT)
Dept: HOME HEALTH SERVICES | Facility: HOSPITAL | Age: 74
End: 2024-07-16
Payer: MEDICARE

## 2024-07-19 ENCOUNTER — DOCUMENT SCAN (OUTPATIENT)
Dept: HOME HEALTH SERVICES | Facility: HOSPITAL | Age: 74
End: 2024-07-19
Payer: MEDICARE

## 2024-07-24 ENCOUNTER — EXTERNAL HOME HEALTH (OUTPATIENT)
Dept: HOME HEALTH SERVICES | Facility: HOSPITAL | Age: 74
End: 2024-07-24
Payer: MEDICARE

## 2024-07-25 DIAGNOSIS — I27.20 SEVERE PULMONARY HYPERTENSION: ICD-10-CM

## 2024-07-28 RX ORDER — SILDENAFIL CITRATE 20 MG/1
20 TABLET ORAL 3 TIMES DAILY
Qty: 270 TABLET | Refills: 3 | Status: ACTIVE | OUTPATIENT
Start: 2024-07-28 | End: 2025-07-28

## 2024-07-31 ENCOUNTER — DOCUMENT SCAN (OUTPATIENT)
Dept: HOME HEALTH SERVICES | Facility: HOSPITAL | Age: 74
End: 2024-07-31
Payer: MEDICARE

## 2024-08-08 ENCOUNTER — OFFICE VISIT (OUTPATIENT)
Dept: HEMATOLOGY/ONCOLOGY | Facility: CLINIC | Age: 74
End: 2024-08-08
Payer: MEDICARE

## 2024-08-08 ENCOUNTER — LAB VISIT (OUTPATIENT)
Dept: LAB | Facility: HOSPITAL | Age: 74
End: 2024-08-08
Attending: PHYSICIAN ASSISTANT
Payer: MEDICARE

## 2024-08-08 VITALS
HEIGHT: 66 IN | SYSTOLIC BLOOD PRESSURE: 135 MMHG | OXYGEN SATURATION: 95 % | HEART RATE: 80 BPM | WEIGHT: 163.13 LBS | RESPIRATION RATE: 17 BRPM | DIASTOLIC BLOOD PRESSURE: 79 MMHG | BODY MASS INDEX: 26.22 KG/M2 | TEMPERATURE: 98 F

## 2024-08-08 DIAGNOSIS — I05.9 MITRAL VALVE DISORDER: Primary | ICD-10-CM

## 2024-08-08 DIAGNOSIS — I05.9 MITRAL VALVE DISORDER: ICD-10-CM

## 2024-08-08 DIAGNOSIS — D50.0 IRON DEFICIENCY ANEMIA DUE TO CHRONIC BLOOD LOSS: ICD-10-CM

## 2024-08-08 DIAGNOSIS — Z79.01 LONG TERM (CURRENT) USE OF ANTICOAGULANTS: ICD-10-CM

## 2024-08-08 DIAGNOSIS — I82.622 ACUTE DEEP VEIN THROMBOSIS (DVT) OF BRACHIAL VEIN OF LEFT UPPER EXTREMITY: ICD-10-CM

## 2024-08-08 DIAGNOSIS — Z95.4 HX OF MITRAL VALVE REPLACEMENT WITH TISSUE GRAFT: ICD-10-CM

## 2024-08-08 DIAGNOSIS — E66.3 OVERWEIGHT (BMI 25.0-29.9): ICD-10-CM

## 2024-08-08 LAB
ALBUMIN SERPL BCP-MCNC: 3.4 G/DL (ref 3.5–5.2)
ALP SERPL-CCNC: 106 U/L (ref 55–135)
ALT SERPL W/O P-5'-P-CCNC: 19 U/L (ref 10–44)
ANION GAP SERPL CALC-SCNC: 10 MMOL/L (ref 8–16)
AST SERPL-CCNC: 33 U/L (ref 10–40)
BASOPHILS # BLD AUTO: 0.07 K/UL (ref 0–0.2)
BASOPHILS NFR BLD: 1.3 % (ref 0–1.9)
BILIRUB SERPL-MCNC: 0.6 MG/DL (ref 0.1–1)
BUN SERPL-MCNC: 11 MG/DL (ref 8–23)
CALCIUM SERPL-MCNC: 11.5 MG/DL (ref 8.7–10.5)
CHLORIDE SERPL-SCNC: 109 MMOL/L (ref 95–110)
CO2 SERPL-SCNC: 22 MMOL/L (ref 23–29)
CREAT SERPL-MCNC: 0.9 MG/DL (ref 0.5–1.4)
DIFFERENTIAL METHOD BLD: ABNORMAL
EOSINOPHIL # BLD AUTO: 0.1 K/UL (ref 0–0.5)
EOSINOPHIL NFR BLD: 1.8 % (ref 0–8)
ERYTHROCYTE [DISTWIDTH] IN BLOOD BY AUTOMATED COUNT: 17.7 % (ref 11.5–14.5)
EST. GFR  (NO RACE VARIABLE): >60 ML/MIN/1.73 M^2
FERRITIN SERPL-MCNC: 18 NG/ML (ref 20–300)
GLUCOSE SERPL-MCNC: 91 MG/DL (ref 70–110)
HCT VFR BLD AUTO: 32.6 % (ref 37–48.5)
HGB BLD-MCNC: 10 G/DL (ref 12–16)
IMM GRANULOCYTES # BLD AUTO: 0 K/UL (ref 0–0.04)
IMM GRANULOCYTES NFR BLD AUTO: 0 % (ref 0–0.5)
LYMPHOCYTES # BLD AUTO: 2.5 K/UL (ref 1–4.8)
LYMPHOCYTES NFR BLD: 46.3 % (ref 18–48)
MCH RBC QN AUTO: 23 PG (ref 27–31)
MCHC RBC AUTO-ENTMCNC: 30.7 G/DL (ref 32–36)
MCV RBC AUTO: 75 FL (ref 82–98)
MONOCYTES # BLD AUTO: 0.9 K/UL (ref 0.3–1)
MONOCYTES NFR BLD: 16.8 % (ref 4–15)
NEUTROPHILS # BLD AUTO: 1.8 K/UL (ref 1.8–7.7)
NEUTROPHILS NFR BLD: 33.8 % (ref 38–73)
NRBC BLD-RTO: 0 /100 WBC
PLATELET # BLD AUTO: 259 K/UL (ref 150–450)
PMV BLD AUTO: 10.2 FL (ref 9.2–12.9)
POTASSIUM SERPL-SCNC: 3.9 MMOL/L (ref 3.5–5.1)
PROT SERPL-MCNC: 7.9 G/DL (ref 6–8.4)
RBC # BLD AUTO: 4.35 M/UL (ref 4–5.4)
SODIUM SERPL-SCNC: 141 MMOL/L (ref 136–145)
WBC # BLD AUTO: 5.42 K/UL (ref 3.9–12.7)

## 2024-08-08 PROCEDURE — 86146 BETA-2 GLYCOPROTEIN ANTIBODY: CPT | Performed by: INTERNAL MEDICINE

## 2024-08-08 PROCEDURE — 85025 COMPLETE CBC W/AUTO DIFF WBC: CPT | Mod: PN | Performed by: INTERNAL MEDICINE

## 2024-08-08 PROCEDURE — 3288F FALL RISK ASSESSMENT DOCD: CPT | Mod: CPTII,S$GLB,, | Performed by: INTERNAL MEDICINE

## 2024-08-08 PROCEDURE — 81240 F2 GENE: CPT | Performed by: INTERNAL MEDICINE

## 2024-08-08 PROCEDURE — 3075F SYST BP GE 130 - 139MM HG: CPT | Mod: CPTII,S$GLB,, | Performed by: INTERNAL MEDICINE

## 2024-08-08 PROCEDURE — 99999 PR PBB SHADOW E&M-EST. PATIENT-LVL IV: CPT | Mod: PBBFAC,,, | Performed by: INTERNAL MEDICINE

## 2024-08-08 PROCEDURE — 82728 ASSAY OF FERRITIN: CPT | Performed by: INTERNAL MEDICINE

## 2024-08-08 PROCEDURE — 3078F DIAST BP <80 MM HG: CPT | Mod: CPTII,S$GLB,, | Performed by: INTERNAL MEDICINE

## 2024-08-08 PROCEDURE — 3044F HG A1C LEVEL LT 7.0%: CPT | Mod: CPTII,S$GLB,, | Performed by: INTERNAL MEDICINE

## 2024-08-08 PROCEDURE — 1159F MED LIST DOCD IN RCRD: CPT | Mod: CPTII,S$GLB,, | Performed by: INTERNAL MEDICINE

## 2024-08-08 PROCEDURE — 80053 COMPREHEN METABOLIC PANEL: CPT | Mod: PO | Performed by: INTERNAL MEDICINE

## 2024-08-08 PROCEDURE — 99204 OFFICE O/P NEW MOD 45 MIN: CPT | Mod: S$GLB,,, | Performed by: INTERNAL MEDICINE

## 2024-08-08 PROCEDURE — 1126F AMNT PAIN NOTED NONE PRSNT: CPT | Mod: CPTII,S$GLB,, | Performed by: INTERNAL MEDICINE

## 2024-08-08 PROCEDURE — 86147 CARDIOLIPIN ANTIBODY EA IG: CPT | Mod: 59 | Performed by: INTERNAL MEDICINE

## 2024-08-08 PROCEDURE — 1101F PT FALLS ASSESS-DOCD LE1/YR: CPT | Mod: CPTII,S$GLB,, | Performed by: INTERNAL MEDICINE

## 2024-08-08 PROCEDURE — 81241 F5 GENE: CPT | Performed by: INTERNAL MEDICINE

## 2024-08-08 PROCEDURE — G2211 COMPLEX E/M VISIT ADD ON: HCPCS | Mod: S$GLB,,, | Performed by: INTERNAL MEDICINE

## 2024-08-08 PROCEDURE — 36415 COLL VENOUS BLD VENIPUNCTURE: CPT | Mod: PN | Performed by: INTERNAL MEDICINE

## 2024-08-12 LAB
B2 GLYCOPROT1 IGA SER QL: 6.4 U/ML
B2 GLYCOPROT1 IGG SER QL: 2.1 U/ML
B2 GLYCOPROT1 IGM SER QL: 3.6 U/ML
CARDIOLIPIN IGG SER IA-ACNC: <9.4 GPL (ref 0–14.99)
CARDIOLIPIN IGM SER IA-ACNC: <9.4 MPL (ref 0–12.49)
F2 C.20210G>A GENO BLD/T: NEGATIVE
F5 P.R506Q BLD/T QL: NEGATIVE

## 2024-08-14 ENCOUNTER — TELEPHONE (OUTPATIENT)
Dept: VASCULAR SURGERY | Facility: CLINIC | Age: 74
End: 2024-08-14
Payer: MEDICARE

## 2024-08-14 ENCOUNTER — DOCUMENT SCAN (OUTPATIENT)
Dept: HOME HEALTH SERVICES | Facility: HOSPITAL | Age: 74
End: 2024-08-14
Payer: MEDICARE

## 2024-08-14 NOTE — TELEPHONE ENCOUNTER
Spoke w/ pt. She questioned about how much of her coumadin to take. Advised pt to call coumadin clinic. # given. Pt verbalized understanding.      ----- Message from Nancy Garcia, Patient Care Assistant sent at 8/14/2024  3:04 PM CDT -----  Regarding: medication  Contact: pt  Type: Needs Medical Advice    Who Called:  pt     Best Call Back Number: [phone] (home)     Additional Information: pt states she would like a callback regarding triamcinolone acetonide 0.1% (KENALOG) 0.1 % cream. Please call to advise. Thanks!

## 2024-08-15 ENCOUNTER — OFFICE VISIT (OUTPATIENT)
Dept: VASCULAR SURGERY | Facility: CLINIC | Age: 74
End: 2024-08-15
Payer: MEDICARE

## 2024-08-15 ENCOUNTER — TELEPHONE (OUTPATIENT)
Dept: VASCULAR SURGERY | Facility: CLINIC | Age: 74
End: 2024-08-15
Payer: MEDICARE

## 2024-08-15 VITALS
HEART RATE: 81 BPM | HEIGHT: 66 IN | WEIGHT: 163.13 LBS | DIASTOLIC BLOOD PRESSURE: 89 MMHG | SYSTOLIC BLOOD PRESSURE: 166 MMHG | BODY MASS INDEX: 26.22 KG/M2

## 2024-08-15 DIAGNOSIS — Z95.4 HX OF MITRAL VALVE REPLACEMENT WITH TISSUE GRAFT: Primary | ICD-10-CM

## 2024-08-15 PROCEDURE — 1159F MED LIST DOCD IN RCRD: CPT | Mod: CPTII,S$GLB,, | Performed by: THORACIC SURGERY (CARDIOTHORACIC VASCULAR SURGERY)

## 2024-08-15 PROCEDURE — 99999 PR PBB SHADOW E&M-EST. PATIENT-LVL III: CPT | Mod: PBBFAC,,, | Performed by: THORACIC SURGERY (CARDIOTHORACIC VASCULAR SURGERY)

## 2024-08-15 PROCEDURE — 99024 POSTOP FOLLOW-UP VISIT: CPT | Mod: S$GLB,,, | Performed by: THORACIC SURGERY (CARDIOTHORACIC VASCULAR SURGERY)

## 2024-08-15 PROCEDURE — 3077F SYST BP >= 140 MM HG: CPT | Mod: CPTII,S$GLB,, | Performed by: THORACIC SURGERY (CARDIOTHORACIC VASCULAR SURGERY)

## 2024-08-15 PROCEDURE — 3079F DIAST BP 80-89 MM HG: CPT | Mod: CPTII,S$GLB,, | Performed by: THORACIC SURGERY (CARDIOTHORACIC VASCULAR SURGERY)

## 2024-08-15 PROCEDURE — 1126F AMNT PAIN NOTED NONE PRSNT: CPT | Mod: CPTII,S$GLB,, | Performed by: THORACIC SURGERY (CARDIOTHORACIC VASCULAR SURGERY)

## 2024-08-15 PROCEDURE — 3044F HG A1C LEVEL LT 7.0%: CPT | Mod: CPTII,S$GLB,, | Performed by: THORACIC SURGERY (CARDIOTHORACIC VASCULAR SURGERY)

## 2024-08-15 RX ORDER — FUROSEMIDE 40 MG/1
40 TABLET ORAL DAILY
Qty: 30 TABLET | Refills: 11 | Status: SHIPPED | OUTPATIENT
Start: 2024-08-15 | End: 2025-08-15

## 2024-08-15 NOTE — PROGRESS NOTES
08/15/2024...    The patient is status post mitral valve replacement (27 mm Medtronic Mosaic porcine) and ligation of the left atrial appendage 04/15/2024.  Her postoperative course has been smooth and progressive.  She was discharged home on the 4th postoperative day.     The patient has been compliant with her Coumadin.  The INR has been therapeutic.    Unfortunately, the patient developed a left upper extremity brachial DVT on 06/15/2024 with an INR of 2.7.  She was seen by Dr. Ronel Marin (hematology) who has recommended stopping the Coumadin and beginning a three-month course of Eliquis.    The Eliquis has not yet been prescribed.    The patient visits the office today.  She is unaccompanied.    She has minimal left upper extremity complaints although states that sometimes it feels weak.  There is no obvious edema and the extremity is not painful.    Otherwise, the patient has no cardiopulmonary complaints although she ran out of her Lasix and has developed some minor lower extremity edema.  Today, I refilled the Lasix 40 mg daily for 1 year.    On physical exam, breath sounds are clear and equal bilaterally.  She is in a regular rate and rhythm.  There is no audible murmur.  The sternotomy wound has healed completely.  The bone is solid.  The chest tube sites are healed.  She has minor bilateral lower extremity edema.    Echocardiogram 07/10/2024 demonstrates an ejection fraction of 55%.  The mitral valve prosthesis is well functioning.  There is no paravalvular leaks.    -Today, I have instructed the patient to stop taking Coumadin.  No further Coumadin clinic visits are needed.  -In addition to the Lasix, I have prescribed Eliquis 5 mg twice daily and instructed her that this will be a 3 month course.  She is due to see Dr. Marin 10/04/2024.  -I will discharge her from the cardiac surgery clinic today.

## 2024-08-18 PROCEDURE — G0179 MD RECERTIFICATION HHA PT: HCPCS | Mod: ,,, | Performed by: PHYSICIAN ASSISTANT

## 2024-08-19 DIAGNOSIS — I25.118 CORONARY ARTERY DISEASE OF NATIVE ARTERY OF NATIVE HEART WITH STABLE ANGINA PECTORIS: ICD-10-CM

## 2024-08-19 DIAGNOSIS — I10 PRIMARY HYPERTENSION: ICD-10-CM

## 2024-08-19 RX ORDER — ATENOLOL 50 MG/1
50 TABLET ORAL
Qty: 90 TABLET | Refills: 3 | Status: SHIPPED | OUTPATIENT
Start: 2024-08-19

## 2024-08-22 ENCOUNTER — LAB VISIT (OUTPATIENT)
Dept: PRIMARY CARE CLINIC | Facility: CLINIC | Age: 74
End: 2024-08-22
Payer: MEDICARE

## 2024-08-22 DIAGNOSIS — R94.30 ELEVATED LEFT VENTRICULAR END-DIASTOLIC PRESSURE (LVEDP): Chronic | ICD-10-CM

## 2024-08-22 DIAGNOSIS — I05.9 MITRAL VALVE DISORDER: Chronic | ICD-10-CM

## 2024-08-22 DIAGNOSIS — Z79.01 LONG TERM (CURRENT) USE OF ANTICOAGULANTS: Chronic | ICD-10-CM

## 2024-08-22 LAB
ALBUMIN SERPL BCP-MCNC: 3.4 G/DL (ref 3.5–5.2)
ALP SERPL-CCNC: 133 U/L (ref 55–135)
ALT SERPL W/O P-5'-P-CCNC: 17 U/L (ref 10–44)
ANION GAP SERPL CALC-SCNC: 8 MMOL/L (ref 8–16)
AST SERPL-CCNC: 30 U/L (ref 10–40)
BILIRUB SERPL-MCNC: 0.6 MG/DL (ref 0.1–1)
BNP SERPL-MCNC: 251 PG/ML (ref 0–99)
BUN SERPL-MCNC: 8 MG/DL (ref 8–23)
CALCIUM SERPL-MCNC: 11 MG/DL (ref 8.7–10.5)
CHLORIDE SERPL-SCNC: 107 MMOL/L (ref 95–110)
CO2 SERPL-SCNC: 25 MMOL/L (ref 23–29)
CREAT SERPL-MCNC: 0.9 MG/DL (ref 0.5–1.4)
EST. GFR  (NO RACE VARIABLE): >60 ML/MIN/1.73 M^2
GLUCOSE SERPL-MCNC: 125 MG/DL (ref 70–110)
POTASSIUM SERPL-SCNC: 3.8 MMOL/L (ref 3.5–5.1)
PROT SERPL-MCNC: 7.6 G/DL (ref 6–8.4)
SODIUM SERPL-SCNC: 140 MMOL/L (ref 136–145)

## 2024-08-22 PROCEDURE — 80053 COMPREHEN METABOLIC PANEL: CPT | Performed by: INTERNAL MEDICINE

## 2024-08-22 PROCEDURE — 36415 COLL VENOUS BLD VENIPUNCTURE: CPT | Mod: S$GLB,,, | Performed by: INTERNAL MEDICINE

## 2024-08-22 PROCEDURE — 83880 ASSAY OF NATRIURETIC PEPTIDE: CPT | Performed by: INTERNAL MEDICINE

## 2024-08-26 ENCOUNTER — DOCUMENT SCAN (OUTPATIENT)
Dept: HOME HEALTH SERVICES | Facility: HOSPITAL | Age: 74
End: 2024-08-26
Payer: MEDICARE

## 2024-08-27 ENCOUNTER — OFFICE VISIT (OUTPATIENT)
Dept: PRIMARY CARE CLINIC | Facility: CLINIC | Age: 74
End: 2024-08-27
Payer: MEDICARE

## 2024-08-27 VITALS
HEIGHT: 66 IN | SYSTOLIC BLOOD PRESSURE: 120 MMHG | DIASTOLIC BLOOD PRESSURE: 62 MMHG | WEIGHT: 159.06 LBS | HEART RATE: 80 BPM | BODY MASS INDEX: 25.56 KG/M2

## 2024-08-27 DIAGNOSIS — I27.20 PULMONARY HYPERTENSION: ICD-10-CM

## 2024-08-27 DIAGNOSIS — D50.9 IRON DEFICIENCY ANEMIA, UNSPECIFIED IRON DEFICIENCY ANEMIA TYPE: Primary | ICD-10-CM

## 2024-08-27 DIAGNOSIS — I82.A12 ACUTE DEEP VEIN THROMBOSIS (DVT) OF AXILLARY VEIN OF LEFT UPPER EXTREMITY: ICD-10-CM

## 2024-08-27 DIAGNOSIS — I27.20 SEVERE PULMONARY HYPERTENSION: ICD-10-CM

## 2024-08-27 PROCEDURE — 99214 OFFICE O/P EST MOD 30 MIN: CPT | Mod: S$GLB,,, | Performed by: PHYSICIAN ASSISTANT

## 2024-08-27 PROCEDURE — 1159F MED LIST DOCD IN RCRD: CPT | Mod: CPTII,S$GLB,, | Performed by: PHYSICIAN ASSISTANT

## 2024-08-27 PROCEDURE — 1101F PT FALLS ASSESS-DOCD LE1/YR: CPT | Mod: CPTII,S$GLB,, | Performed by: PHYSICIAN ASSISTANT

## 2024-08-27 PROCEDURE — 3074F SYST BP LT 130 MM HG: CPT | Mod: CPTII,S$GLB,, | Performed by: PHYSICIAN ASSISTANT

## 2024-08-27 PROCEDURE — 3288F FALL RISK ASSESSMENT DOCD: CPT | Mod: CPTII,S$GLB,, | Performed by: PHYSICIAN ASSISTANT

## 2024-08-27 PROCEDURE — 3078F DIAST BP <80 MM HG: CPT | Mod: CPTII,S$GLB,, | Performed by: PHYSICIAN ASSISTANT

## 2024-08-27 PROCEDURE — 3044F HG A1C LEVEL LT 7.0%: CPT | Mod: CPTII,S$GLB,, | Performed by: PHYSICIAN ASSISTANT

## 2024-08-27 PROCEDURE — 1126F AMNT PAIN NOTED NONE PRSNT: CPT | Mod: CPTII,S$GLB,, | Performed by: PHYSICIAN ASSISTANT

## 2024-08-27 RX ORDER — AZELASTINE 1 MG/ML
1 SPRAY, METERED NASAL 2 TIMES DAILY
Qty: 30 ML | Refills: 0 | Status: SHIPPED | OUTPATIENT
Start: 2024-08-27 | End: 2025-08-27

## 2024-08-27 RX ORDER — FERROUS ASPARTO GLYCINATE, IRON, ASCORBIC ACID, FOLIC ACID, CYANOCOBALAMIN, ZINC, SUCCINIC ACID, AND INTRINSIC FACTOR 50; 100; 60; 750; 250; 25; 15; 50; 100 MG/1; MG/1; MG/1; UG/1; UG/1; UG/1; MG/1; MG/1; MG/1
1 TABLET ORAL DAILY
Qty: 30 TABLET | Refills: 11 | Status: SHIPPED | OUTPATIENT
Start: 2024-08-27

## 2024-08-27 NOTE — PROGRESS NOTES
Subjective     Patient ID: Aleida Bliss is a 74 y.o. female.    Chief Complaint: Deep Vein Thrombosis (3 mo f/u)    Patient is a 73 yo female who comes in for a check up     Patient Active Problem List:     Mitral valve disorder     Chest pain with high risk of acute coronary syndrome     Bradycardia     Severe pulmonary hypertension     Severe mitral regurgitation     Elevated left ventricular end-diastolic pressure (LVEDP)     H/O: rheumatic fever     Overweight (BMI 25.0-29.9)     Primary hypertension     Hypercholesterolemia     Nonspecific abnormal electrocardiogram (ECG) (EKG)     Imbalance     Cervicalgia     Abnormal nuclear stress test     Moderate to severe mitral regurgitation     Coronary artery disease of native artery of native heart with stable angina pectoris     Gastroesophageal reflux disease     Vitamin D deficiency disease     Seasonal allergies     Mitral regurgitation and aortic stenosis     Pulmonary hypertension     Carotid artery plaque, left     Hypomagnesemia     Mild carotid artery disease     Multiple thyroid nodules     Hypercalcemia     Age-related osteoporosis without current pathological fracture     Encounter for pre-operative cardiovascular clearance     PVC (premature ventricular contraction)     DOMINIC on CPAP     Hyperparathyroidism     SOB (shortness of breath)     Hx of mitral valve replacement     Hx of mitral valve replacement with tissue graft     Long term (current) use of anticoagulants     Thrombocytopenia, unspecified     Acute deep vein thrombosis (DVT) of axillary vein of left upper extremity     Iron deficiency anemia     Past Medical History:  No date: Arthritis  No date: Coronary artery disease  No date: GERD (gastroesophageal reflux disease)  No date: Hypertension  No date: Sleep apnea      Comment:  uses C-PAP  No date: SOB (shortness of breath)  No date: Stroke      Comment:  TIA approx 2021     Had a recent DVT and was placed on eliquis      Review of Systems    Constitutional:  Positive for fatigue. Negative for activity change, chills and fever.   HENT: Negative.     Respiratory:  Negative for chest tightness, shortness of breath and wheezing.    Cardiovascular:  Negative for chest pain.   Gastrointestinal:  Negative for abdominal pain, constipation and nausea.   Genitourinary: Negative.    Neurological:  Negative for dizziness and seizures.          Objective     Physical Exam  Vitals reviewed.   Constitutional:       General: She is not in acute distress.     Appearance: She is not ill-appearing, toxic-appearing or diaphoretic.   HENT:      Head: Normocephalic and atraumatic.   Cardiovascular:      Rate and Rhythm: Normal rate and regular rhythm.      Pulses: Normal pulses.      Heart sounds: Normal heart sounds.   Pulmonary:      Effort: Pulmonary effort is normal.      Breath sounds: Normal breath sounds.   Abdominal:      Palpations: Abdomen is soft.      Tenderness: There is no abdominal tenderness.   Neurological:      Mental Status: She is alert.            Assessment and Plan     1. Iron deficiency anemia, unspecified iron deficiency anemia type  -     iron ag,sw-[id number]-Zn-sa-sto (NIFEREX, SUMALATE-QUATREFOLIC,) 150 mg iron- 60 mg-1 mg Tab; Take 1 tablet by mouth once daily.  Dispense: 30 tablet; Refill: 11  May help improve energy    2. Severe pulmonary hypertension  The current medical regimen is effective;  continue present plan and medications.     3. Pulmonary hypertension  The current medical regimen is effective;  continue present plan and medications.     4. Acute deep vein thrombosis (DVT) of axillary vein of left upper extremity  The current medical regimen is effective;  continue present plan and medications.   Other orders  -     azelastine (ASTELIN) 137 mcg (0.1 %) nasal spray; 1 spray (137 mcg total) by Nasal route 2 (two) times daily.  Dispense: 30 mL; Refill: 0  -     magnesium glycinate 100 mg Tab; Take 1 tablet by mouth nightly.  Dispense:  30 tablet; Refill: 11                 I spent 30 minutes on this encounter, time includes face-to-face, chart review, documentation, test review and orders.

## 2024-09-09 DIAGNOSIS — Z95.4 HX OF MITRAL VALVE REPLACEMENT WITH TISSUE GRAFT: ICD-10-CM

## 2024-09-09 NOTE — TELEPHONE ENCOUNTER
Spoke to Carmella, I will send this rx to [facility] so they will be able to give the patient the best price.      ----- Message from Kasey Muhammad, Patient Care Assistant sent at 9/9/2024  9:40 AM CDT -----  ,Type: Needs Medical Advice  Who Called:  Carmella   Best Call Back Number: [phone]   Additional Information: [facility] home [facility]  nurse states she was trying to reach dr Marin about above patient is running out of  apixaban (ELIQUIS) 5 mg Tab and it will cost her $ 130.00 a month and [facility] is not able to pay that , please call Carmella back to further discuss thank you .

## 2024-09-12 ENCOUNTER — EXTERNAL HOME HEALTH (OUTPATIENT)
Dept: HOME HEALTH SERVICES | Facility: HOSPITAL | Age: 74
End: 2024-09-12
Payer: MEDICARE

## 2024-09-18 ENCOUNTER — OFFICE VISIT (OUTPATIENT)
Dept: CARDIOLOGY | Facility: CLINIC | Age: 74
End: 2024-09-18
Payer: MEDICARE

## 2024-09-18 VITALS — SYSTOLIC BLOOD PRESSURE: 133 MMHG | DIASTOLIC BLOOD PRESSURE: 63 MMHG | HEART RATE: 82 BPM

## 2024-09-18 DIAGNOSIS — I27.20 PULMONARY HYPERTENSION: Chronic | ICD-10-CM

## 2024-09-18 DIAGNOSIS — I51.7 LAE (LEFT ATRIAL ENLARGEMENT): ICD-10-CM

## 2024-09-18 DIAGNOSIS — I77.9 MILD CAROTID ARTERY DISEASE: Chronic | ICD-10-CM

## 2024-09-18 DIAGNOSIS — I38 VALVULAR HEART DISEASE: Chronic | ICD-10-CM

## 2024-09-18 DIAGNOSIS — I25.10 CORONARY ARTERY DISEASE INVOLVING NATIVE CORONARY ARTERY OF NATIVE HEART WITHOUT ANGINA PECTORIS: Primary | Chronic | ICD-10-CM

## 2024-09-18 DIAGNOSIS — Z79.01 LONG TERM (CURRENT) USE OF ANTICOAGULANTS: Chronic | ICD-10-CM

## 2024-09-18 DIAGNOSIS — I10 PRIMARY HYPERTENSION: Chronic | ICD-10-CM

## 2024-09-18 PROCEDURE — 99214 OFFICE O/P EST MOD 30 MIN: CPT | Mod: S$GLB,,, | Performed by: INTERNAL MEDICINE

## 2024-09-18 PROCEDURE — 1159F MED LIST DOCD IN RCRD: CPT | Mod: CPTII,S$GLB,, | Performed by: INTERNAL MEDICINE

## 2024-09-18 PROCEDURE — 3044F HG A1C LEVEL LT 7.0%: CPT | Mod: CPTII,S$GLB,, | Performed by: INTERNAL MEDICINE

## 2024-09-18 PROCEDURE — 3078F DIAST BP <80 MM HG: CPT | Mod: CPTII,S$GLB,, | Performed by: INTERNAL MEDICINE

## 2024-09-18 PROCEDURE — 3075F SYST BP GE 130 - 139MM HG: CPT | Mod: CPTII,S$GLB,, | Performed by: INTERNAL MEDICINE

## 2024-09-18 PROCEDURE — 1101F PT FALLS ASSESS-DOCD LE1/YR: CPT | Mod: CPTII,S$GLB,, | Performed by: INTERNAL MEDICINE

## 2024-09-18 PROCEDURE — 1126F AMNT PAIN NOTED NONE PRSNT: CPT | Mod: CPTII,S$GLB,, | Performed by: INTERNAL MEDICINE

## 2024-09-18 PROCEDURE — 3288F FALL RISK ASSESSMENT DOCD: CPT | Mod: CPTII,S$GLB,, | Performed by: INTERNAL MEDICINE

## 2024-09-18 NOTE — PROGRESS NOTES
Subjective:    Patient ID:  Aleida Bliss is a 74 y.o. female who presents for Follow-up        HPI    DISCUSSED TESTS, LABS AND GOALS, CMP OK CALCIUM 11 DOWN FROM 11.5, , DOING OK, WEIGHT FLUCTUATES, ECHO PAP 42 MMHG, SEE ROS    Left Ventricle: The left ventricle is normal in size. Normal wall thickness. There is concentric remodeling. Septal motion is abnormal is consistent with post-operative status. There is normal systolic function. Ejection fraction by visual approximation is 55%.    Right Ventricle: Normal right ventricular cavity size. Systolic function is normal.    Left Atrium: Left atrium is severely dilated.    Aortic Valve: Mildly calcified cusps. There is trace to mild aortic regurgitation.    Mitral Valve: There is a bioprosthetic valve in the mitral position that is well-seated.    Tricuspid Valve: There is mild to moderate regurgitation.    Pulmonary Artery: The estimated pulmonary artery systolic pressure is 42 mmHg.    IVC/SVC: Intermediate venous pressure at 8 mmHg.     Past Medical History:   Diagnosis Date    Arthritis     Coronary artery disease     GERD (gastroesophageal reflux disease)     Hypertension     Sleep apnea     uses C-PAP    SOB (shortness of breath)     Stroke     TIA approx 2021     Past Surgical History:   Procedure Laterality Date    CATARACT EXTRACTION EXTRACAPSULAR W/ INTRAOCULAR LENS IMPLANTATION      CATHETERIZATION OF BOTH LEFT AND RIGHT HEART N/A 02/15/2022    Procedure: CATHETERIZATION, HEART, BOTH LEFT AND RIGHT;  Surgeon: Cuong Hinkle MD;  Location: [facility] CATH;  Service: Cardiology;  Laterality: N/A;    CATHETERIZATION OF BOTH LEFT AND RIGHT HEART  2/22/2024    Procedure: Right / Left heart cath;  Surgeon: Cuong Hinkle MD;  Location: [facility] CATH;  Service: Cardiology;;    CHOLECYSTECTOMY      COLONOSCOPY      COLONOSCOPY N/A 3/15/2022    Procedure: COLONOSCOPY;  Surgeon: Varun Toro MD;  Location: [facility] ENDO;  Service: Endoscopy;  Laterality: N/A;     CORONARY ANGIOGRAPHY N/A 2/22/2024    Procedure: ANGIOGRAM, CORONARY ARTERY;  Surgeon: Cuong Hinkle MD;  Location: [facility] CATH;  Service: Cardiology;  Laterality: N/A;    EXCLUSION, LEFT ATRIAL APPENDAGE, OPEN, AS PART OF OPEN CHEST SURGERY N/A 4/15/2024    Procedure: EXCLUSION, LEFT ATRIAL APPENDAGE, OPEN, AS PART OF OPEN CHEST SURGERY;  Surgeon: nAgela Mcdowell MD;  Location: [facility] OR;  Service: Cardiothoracic;  Laterality: N/A;    HYSTERECTOMY      MITRAL VALVE REPLACEMENT N/A 4/15/2024    Procedure: REPLACEMENT, MITRAL VALVE;  Surgeon: Angela Mcdowell MD;  Location: [facility] OR;  Service: Cardiothoracic;  Laterality: N/A;    TRANSESOPHAGEAL ECHOCARDIOGRAPHY N/A 4/15/2024    Procedure: ECHOCARDIOGRAM, TRANSESOPHAGEAL;  Surgeon: Angela Mcdowell MD;  Location: [facility] OR;  Service: Cardiothoracic;  Laterality: N/A;     Family History   Problem Relation Name Age of Onset    Heart disease Mother      Cancer Father          prostate     Social History     Socioeconomic History    Marital status:    Tobacco Use    Smoking status: Never     Passive exposure: Never    Smokeless tobacco: Never   Substance and Sexual Activity    Alcohol use: No    Drug use: No    Sexual activity: Not Currently     Social Determinants of Health     Financial Resource Strain: Medium Risk (4/17/2019)    Received from [facility] (and [facility] [place], [place], and [place] prior to 7/1/2021), [facility] (and [facility] [place], [place], and [place] prior to 7/1/2021)    Financial Resource Strain     How hard is it for you to pay for the very basics like food, housing, medical care, and heating?: Somewhat hard   Food Insecurity: No Food Insecurity (4/16/2024)    Hunger Vital Sign     Worried About Running Out of Food in the Last Year: Never true     Ran Out of Food in the Last Year: Never true   Transportation Needs: Unknown (4/16/2024)    PRAPARE - Transportation     Lack of Transportation (Medical): No   Housing  Stability: Low Risk  (4/16/2024)    Housing Stability Vital Sign     Unable to Pay for Housing in the Last Year: No     Homeless in the Last Year: No       Review of patient's allergies indicates:   Allergen Reactions    Thiazides Other (See Comments)     Elevates calcium    Lisinopril Other (See Comments)     Other reaction(s): Cough      Cyclobenzaprine Rash    Losartan Nausea Only     Other reaction(s): Unknown         Current Outpatient Medications:     apixaban (ELIQUIS) 5 mg Tab, Take 1 tablet (5 mg total) by mouth 2 (two) times daily., Disp: 60 tablet, Rfl: 11    atenoloL (TENORMIN) 50 MG tablet, TAKE 1 TABLET ONE TIME DAILY, Disp: 90 tablet, Rfl: 3    azelastine (ASTELIN) 137 mcg (0.1 %) nasal spray, 1 spray (137 mcg total) by Nasal route 2 (two) times daily., Disp: 30 mL, Rfl: 0    ergocalciferol (ERGOCALCIFEROL) 50,000 unit Cap, TAKE 1 CAPSULE BY MOUTH EVERY MONDAY., Disp: 12 capsule, Rfl: 3    furosemide (LASIX) 40 MG tablet, Take 1 tablet (40 mg total) by mouth once daily., Disp: 30 tablet, Rfl: 11    iron ag,lr-T-[id number]-B12-Zn-sa-sto (NIFEREX, SUMALATE-QUATREFOLIC,) 150 mg iron- 60 mg-1 mg Tab, Take 1 tablet by mouth once daily., Disp: 30 tablet, Rfl: 11    magnesium glycinate 100 mg Tab, Take 1 tablet by mouth nightly., Disp: 30 tablet, Rfl: 11    magnesium oxide (MAG-OX) 400 mg (241.3 mg magnesium) tablet, TAKE 1 TABLET EVERY DAY (Patient taking differently: Take 1 tablet by mouth once daily.), Disp: 90 tablet, Rfl: 3    montelukast (SINGULAIR) 10 mg tablet, TAKE 1 TABLET EVERY EVENING, Disp: 90 tablet, Rfl: 3    omeprazole (PRILOSEC) 40 MG capsule, TAKE 1 CAPSULE (40 MG TOTAL) BY MOUTH ONCE DAILY., Disp: 90 capsule, Rfl: 3    pravastatin (PRAVACHOL) 40 MG tablet, Take 1 tablet (40 mg total) by mouth every evening., Disp: 90 tablet, Rfl: 1    sildenafil (REVATIO) 20 mg Tab, Take 1 tablet (20 mg total) by mouth 3 (three) times daily., Disp: 270 tablet, Rfl: 3    sucralfate (CARAFATE) 1 gram tablet, Take 1  g by mouth every evening., Disp: , Rfl:     triamcinolone acetonide 0.1% (KENALOG) 0.1 % cream, APPLY TOPICALLY 2 (TWO) TIMES DAILY., Disp: 80 g, Rfl: 11    Review of Systems   Constitutional: Negative for chills, decreased appetite, diaphoresis, fever, malaise/fatigue and night sweats. Weight loss: FLUIDS.  HENT:  Negative for congestion and nosebleeds.    Eyes:  Negative for blurred vision and visual disturbance.   Cardiovascular:  Negative for chest pain, claudication, cyanosis, dyspnea on exertion (MILD), irregular heartbeat, leg swelling, near-syncope, orthopnea, palpitations, paroxysmal nocturnal dyspnea and syncope.   Respiratory:  Negative for cough, hemoptysis, shortness of breath and wheezing.    Endocrine: Negative for polyuria.   Hematologic/Lymphatic: Negative for adenopathy. Does not bruise/bleed easily.   Skin:  Negative for color change and itching.   Musculoskeletal:  Positive for arthritis. Negative for back pain and falls.   Gastrointestinal:  Negative for abdominal pain, change in bowel habit, jaundice, melena and nausea.   Genitourinary:  Negative for dysuria and flank pain.   Neurological:  Negative for brief paralysis, focal weakness, headaches, light-headedness, loss of balance and weakness. Dizziness: OCC.  Psychiatric/Behavioral:  Negative for altered mental status and depression.         Objective:      Vitals:    09/18/24 1334   BP: 133/63   Pulse: 82   PainSc: 0-No pain     There is no height or weight on file to calculate BMI.    Physical Exam  Constitutional:       Appearance: Normal appearance. She is overweight.   HENT:      Head: Normocephalic and atraumatic.   Eyes:      Extraocular Movements: Extraocular movements intact.      Conjunctiva/sclera: Conjunctivae normal.   Neck:      Thyroid: No thyromegaly.      Vascular: Normal carotid pulses. No carotid bruit, hepatojugular reflux or JVD.      Trachea: Trachea normal. No tracheal deviation.   Cardiovascular:      Rate and Rhythm:  Normal rate and regular rhythm. No extrasystoles are present.     Pulses:           Carotid pulses are 2+ on the right side and 2+ on the left side.       Radial pulses are 2+ on the right side and 2+ on the left side.        Posterior tibial pulses are 2+ on the right side and 2+ on the left side.      Heart sounds: Murmur heard.      Systolic murmur is present with a grade of 1/6.      No friction rub. No gallop. No S4 sounds.   Pulmonary:      Effort: Pulmonary effort is normal. No respiratory distress.      Breath sounds: Normal breath sounds. No rales.   Chest:       Abdominal:      General: Bowel sounds are normal.      Palpations: Abdomen is soft.      Tenderness: There is no abdominal tenderness.   Musculoskeletal:         General: Normal range of motion.      Cervical back: Neck supple. No edema or erythema.      Right lower leg: No edema.      Left lower leg: No edema.   Skin:     General: Skin is warm and dry.      Capillary Refill: Capillary refill takes less than 2 seconds.   Neurological:      General: No focal deficit present.      Mental Status: She is alert and oriented to person, place, and time.      Cranial Nerves: No cranial nerve deficit.   Psychiatric:         Mood and Affect: Mood normal.         Speech: Speech normal.         Behavior: Behavior normal.               ..    Chemistry        Component Value Date/Time     08/22/2024 1312    K 3.8 08/22/2024 1312     08/22/2024 1312    CO2 25 08/22/2024 1312    BUN 8 08/22/2024 1312    CREATININE 0.9 08/22/2024 1312     (H) 08/22/2024 1312        Component Value Date/Time    CALCIUM 11.0 (H) 08/22/2024 1312    ALKPHOS 133 08/22/2024 1312    AST 30 08/22/2024 1312    ALT 17 08/22/2024 1312    BILITOT 0.6 08/22/2024 1312    ESTGFRAFRICA >60 02/15/2022 0818    EGFRNONAA >60 02/15/2022 0818            ..  Lab Results   Component Value Date    CHOL 183 04/04/2024    CHOL 170 08/12/2022    CHOL 166 12/15/2021     Lab Results    Component Value Date    HDL 47 04/04/2024    HDL 52 08/12/2022    HDL 51 12/15/2021     Lab Results   Component Value Date    LDLCALC 119.6 04/04/2024    LDLCALC 104.2 08/12/2022    LDLCALC 101 (H) 12/15/2021     Lab Results   Component Value Date    TRIG 82 04/04/2024    TRIG 69 08/12/2022    TRIG 76 12/15/2021     Lab Results   Component Value Date    CHOLHDL 25.7 04/04/2024    CHOLHDL 30.6 08/12/2022     ..  Lab Results   Component Value Date    WBC 5.42 08/08/2024    HGB 10.0 (L) 08/08/2024    HCT 32.6 (L) 08/08/2024    MCV 75 (L) 08/08/2024     08/08/2024       Test(s) Reviewed  I have reviewed the following in detail:  [] Stress test   [] Angiography   [x] Echocardiogram   [x] Labs   [] Other:       Assessment:         ICD-10-CM ICD-9-CM   1. Coronary artery disease involving native coronary artery of native heart without angina pectoris  I25.10 414.01   2. Valvular heart disease  I38 424.90   3. Pulmonary hypertension  I27.20 416.8   4. Primary hypertension  I10 401.9   5. Mild carotid artery disease  I77.9 447.9   6. Long term (current) use of anticoagulants  Z79.01 V58.61   7. LAE (left atrial enlargement)  I51.7 429.3     Problem List Items Addressed This Visit          Cardiac/Vascular    Mild carotid artery disease (Chronic)    Primary hypertension    Relevant Orders    Comprehensive Metabolic Panel    Coronary artery disease of native artery of native heart with stable angina pectoris - Primary    Pulmonary hypertension    Relevant Orders    BNP    Valvular heart disease    Relevant Orders    BNP       Hematology    Long term (current) use of anticoagulants    Relevant Orders    Hemoglobin    Comprehensive Metabolic Panel     Other Visit Diagnoses       LAE (left atrial enlargement)                 Plan:     CLINICALLY DOING MUCH BETTER OVERALL PATIENT WAS WORRIED ABOUT WEIGHT LOSS SHE LOST A LOT OF FLUIDS, CLINICALLY NOW NO HEART FAILURE NO ANGINA NO CLINICAL ARRHYTHMIA DIET EXERCISE CONTINUE  CURRENT MEDS RETURN TO CLINIC IN FEW MONTHS WITH LABS      Coronary artery disease involving native coronary artery of native heart without angina pectoris  -     Hemoglobin; Future; Expected date: 03/18/2025  -     Comprehensive Metabolic Panel; Future; Expected date: 09/18/2024    Valvular heart disease  -     BNP; Future; Expected date: 03/18/2025    Pulmonary hypertension  Comments:  BETTER  Orders:  -     BNP; Future; Expected date: 03/18/2025    Primary hypertension  -     Comprehensive Metabolic Panel; Future; Expected date: 09/18/2024    Mild carotid artery disease    Long term (current) use of anticoagulants  -     Hemoglobin; Future; Expected date: 03/18/2025  -     Comprehensive Metabolic Panel; Future; Expected date: 09/18/2024    LAE (left atrial enlargement)    RTC Low level/low impact aerobic exercise 5x's/wk. Heart healthy diet and risk factor modification.    See labs and med orders.    Aerobic exercise 5x's/wk. Heart healthy diet and risk factor modification.    See labs and med orders.

## 2024-10-04 ENCOUNTER — OFFICE VISIT (OUTPATIENT)
Dept: HEMATOLOGY/ONCOLOGY | Facility: CLINIC | Age: 74
End: 2024-10-04
Payer: MEDICARE

## 2024-10-04 VITALS
HEIGHT: 66 IN | WEIGHT: 156.94 LBS | RESPIRATION RATE: 16 BRPM | DIASTOLIC BLOOD PRESSURE: 67 MMHG | SYSTOLIC BLOOD PRESSURE: 147 MMHG | TEMPERATURE: 97 F | OXYGEN SATURATION: 94 % | BODY MASS INDEX: 25.22 KG/M2 | HEART RATE: 85 BPM

## 2024-10-04 DIAGNOSIS — I82.A12 ACUTE DEEP VEIN THROMBOSIS (DVT) OF AXILLARY VEIN OF LEFT UPPER EXTREMITY: Primary | ICD-10-CM

## 2024-10-04 DIAGNOSIS — Z79.01 LONG TERM (CURRENT) USE OF ANTICOAGULANTS: ICD-10-CM

## 2024-10-04 DIAGNOSIS — D50.8 IRON DEFICIENCY ANEMIA SECONDARY TO INADEQUATE DIETARY IRON INTAKE: ICD-10-CM

## 2024-10-04 DIAGNOSIS — Z95.4 HX OF MITRAL VALVE REPLACEMENT WITH TISSUE GRAFT: ICD-10-CM

## 2024-10-04 DIAGNOSIS — E83.52 HYPERCALCEMIA: ICD-10-CM

## 2024-10-04 PROCEDURE — 99999 PR PBB SHADOW E&M-EST. PATIENT-LVL IV: CPT | Mod: PBBFAC,HCNC,, | Performed by: INTERNAL MEDICINE

## 2024-10-04 NOTE — PROGRESS NOTES
Subjective:       Name: Aleida Bliss  : 1950  MRN: [id number]    Chief Complaint   Patient presents with    Deep Vein Thrombosis        Patient is in clinic with her     HPI: Aleida Bliss is a 74 y.o. female presents to discuss the evaluation of the left upper extremity Deep Vein Thrombosis    The patient denies CP, cough, SOB, abdominal pain, nausea, vomiting, constipation.  The patient denies fever, chills, night sweats, weight loss, new lumps or bumps, easy bruising or bleeding.      PREVIOUS HISTORY:  She has a past medical history of CAD, long-term anticoagulant use on warfarin, mitral valve replacement went to the ER on Savita 15, 2024 complaining of left UE swelling that started a week ago.    US of the left upper upper extremity on 6/15/2024 showed:  small occlusive thrombus in a branch of the brachial vein in the antecubital fossa.     The patient denies any personal history of previous thromboembolic events, any recent history of traveling, prolonged immobilization, surgery or trauma. She denies any family history of thromboembolic disease . Her mother had 2 miscarriages in the first and third trimester.  She is up-to-date for his colonoscopy.She is up-to-date for her mammogram.      Oncology History    No history exists.        Past Medical History:   Diagnosis Date    Arthritis     Coronary artery disease     GERD (gastroesophageal reflux disease)     Hypertension     Sleep apnea     uses C-PAP    SOB (shortness of breath)     Stroke     TIA approx        Past Surgical History:   Procedure Laterality Date    CATARACT EXTRACTION EXTRACAPSULAR W/ INTRAOCULAR LENS IMPLANTATION      CATHETERIZATION OF BOTH LEFT AND RIGHT HEART N/A 02/15/2022    Procedure: CATHETERIZATION, HEART, BOTH LEFT AND RIGHT;  Surgeon: Cuong Hinkle MD;  Location: [facility] CATH;  Service: Cardiology;  Laterality: N/A;    CATHETERIZATION OF BOTH LEFT AND RIGHT HEART  2024    Procedure: Right / Left heart cath;   Surgeon: Cuong Hinkle MD;  Location: [facility] CATH;  Service: Cardiology;;    CHOLECYSTECTOMY      COLONOSCOPY      COLONOSCOPY N/A 3/15/2022    Procedure: COLONOSCOPY;  Surgeon: Varun Toro MD;  Location: [facility] ENDO;  Service: Endoscopy;  Laterality: N/A;    CORONARY ANGIOGRAPHY N/A 2/22/2024    Procedure: ANGIOGRAM, CORONARY ARTERY;  Surgeon: Cuong Hinkle MD;  Location: [facility] CATH;  Service: Cardiology;  Laterality: N/A;    EXCLUSION, LEFT ATRIAL APPENDAGE, OPEN, AS PART OF OPEN CHEST SURGERY N/A 4/15/2024    Procedure: EXCLUSION, LEFT ATRIAL APPENDAGE, OPEN, AS PART OF OPEN CHEST SURGERY;  Surgeon: Angela Mcdowell MD;  Location: [facility] OR;  Service: Cardiothoracic;  Laterality: N/A;    HYSTERECTOMY      MITRAL VALVE REPLACEMENT N/A 4/15/2024    Procedure: REPLACEMENT, MITRAL VALVE;  Surgeon: Angela Mcdowell MD;  Location: [facility] OR;  Service: Cardiothoracic;  Laterality: N/A;    TRANSESOPHAGEAL ECHOCARDIOGRAPHY N/A 4/15/2024    Procedure: ECHOCARDIOGRAM, TRANSESOPHAGEAL;  Surgeon: Angela Mcdowell MD;  Location: [facility] OR;  Service: Cardiothoracic;  Laterality: N/A;       Family History   Problem Relation Name Age of Onset    Heart disease Mother      Cancer Father          prostate       Social History     Socioeconomic History    Marital status:    Tobacco Use    Smoking status: Never     Passive exposure: Never    Smokeless tobacco: Never   Substance and Sexual Activity    Alcohol use: No    Drug use: No    Sexual activity: Not Currently     Social Drivers of Health     Financial Resource Strain: Medium Risk (4/17/2019)    Received from [facility] (and [employer] [place], [place], and [place] prior to 7/1/2021), [facility] (and [employer] [place], [place], and [place] prior to 7/1/2021)    Financial Resource Strain     How hard is it for you to pay for the very basics like food, housing, medical care, and heating?: Somewhat hard   Food Insecurity: No Food Insecurity  "(4/16/2024)    Hunger Vital Sign     Worried About Running Out of Food in the Last Year: Never true     Ran Out of Food in the Last Year: Never true   Transportation Needs: Unknown (4/16/2024)    PRAPARE - Transportation     Lack of Transportation (Medical): No   Housing Stability: Low Risk  (4/16/2024)    Housing Stability Vital Sign     Unable to Pay for Housing in the Last Year: No     Homeless in the Last Year: No       Review of patient's allergies indicates:   Allergen Reactions    Thiazides Other (See Comments)     Elevates calcium    Lisinopril Other (See Comments)     Other reaction(s): Cough      Cyclobenzaprine Rash    Losartan Nausea Only     Other reaction(s): Unknown         Review of Systems   Constitutional:  Negative for fatigue and fever.   HENT:  Negative for mouth sores and sore throat.    Eyes:  Negative for photophobia and visual disturbance.   Respiratory:  Negative for cough and shortness of breath.    Cardiovascular:  Negative for chest pain.   Gastrointestinal:  Negative for abdominal pain, constipation and diarrhea.   Genitourinary:  Negative for dysuria and hematuria.   Musculoskeletal:  Negative for arthralgias and joint swelling.   Neurological:  Negative for dizziness, weakness and light-headedness.   Hematological:  Does not bruise/bleed easily.   Psychiatric/Behavioral:  Negative for sleep disturbance. The patient is not nervous/anxious.             Objective:     Vitals:    10/04/24 1021   BP: (!) 147/67   BP Location: Left arm   Patient Position: Sitting   Pulse: 85   Resp: 16   Temp: 97.2 °F (36.2 °C)   TempSrc: Temporal   SpO2: (!) 94%   Weight: 71.2 kg (156 lb 15.5 oz)   Height: 5' 6" (1.676 m)        Physical Exam  Vitals reviewed.   Constitutional:       Appearance: Normal appearance.   HENT:      Head: Normocephalic and atraumatic.   Eyes:      General: No scleral icterus.     Pupils: Pupils are equal, round, and reactive to light.   Cardiovascular:      Rate and Rhythm: " Normal rate and regular rhythm.      Pulses: Normal pulses.      Heart sounds: Normal heart sounds.   Pulmonary:      Effort: Pulmonary effort is normal.      Breath sounds: Normal breath sounds.   Abdominal:      General: Bowel sounds are normal. There is no distension.   Musculoskeletal:         General: No swelling.   Lymphadenopathy:      Cervical: No cervical adenopathy.   Skin:     General: Skin is warm.      Findings: No rash.   Neurological:      General: No focal deficit present.      Mental Status: She is alert and oriented to person, place, and time.      Cranial Nerves: No cranial nerve deficit.      Motor: No weakness.   Psychiatric:         Mood and Affect: Mood normal.         Behavior: Behavior normal.                Current Outpatient Medications on File Prior to Visit   Medication Sig    apixaban (ELIQUIS) 5 mg Tab Take 1 tablet (5 mg total) by mouth 2 (two) times daily.    atenoloL (TENORMIN) 50 MG tablet TAKE 1 TABLET ONE TIME DAILY    azelastine (ASTELIN) 137 mcg (0.1 %) nasal spray 1 spray (137 mcg total) by Nasal route 2 (two) times daily.    ergocalciferol (ERGOCALCIFEROL) 50,000 unit Cap TAKE 1 CAPSULE BY MOUTH EVERY MONDAY.    furosemide (LASIX) 40 MG tablet Take 1 tablet (40 mg total) by mouth once daily.    iron ag,[id number] (NIFEREX, SUMALATE-QUATREFOLIC,) 150 mg iron- 60 mg-1 mg Tab Take 1 tablet by mouth once daily.    magnesium glycinate 100 mg Tab Take 1 tablet by mouth nightly.    magnesium oxide (MAG-OX) 400 mg (241.3 mg magnesium) tablet TAKE 1 TABLET EVERY DAY    montelukast (SINGULAIR) 10 mg tablet TAKE 1 TABLET EVERY EVENING    omeprazole (PRILOSEC) 40 MG capsule TAKE 1 CAPSULE (40 MG TOTAL) BY MOUTH ONCE DAILY.    pravastatin (PRAVACHOL) 40 MG tablet Take 1 tablet (40 mg total) by mouth every evening.    sildenafil (REVATIO) 20 mg Tab Take 1 tablet (20 mg total) by mouth 3 (three) times daily.    sucralfate (CARAFATE) 1 gram tablet Take 1 g by mouth every evening.     triamcinolone acetonide 0.1% (KENALOG) 0.1 % cream APPLY TOPICALLY 2 (TWO) TIMES DAILY.     No current facility-administered medications on file prior to visit.       CBC:  Lab Results   Component Value Date    WBC 5.42 08/08/2024    HGB 10.0 (L) 08/08/2024    HCT 32.6 (L) 08/08/2024    MCV 75 (L) 08/08/2024     08/08/2024         CMP:  Sodium   Date Value Ref Range Status   08/22/2024 140 136 - 145 mmol/L Final     Potassium   Date Value Ref Range Status   08/22/2024 3.8 3.5 - 5.1 mmol/L Final     Chloride   Date Value Ref Range Status   08/22/2024 107 95 - 110 mmol/L Final     CO2   Date Value Ref Range Status   08/22/2024 25 23 - 29 mmol/L Final     Glucose   Date Value Ref Range Status   08/22/2024 125 (H) 70 - 110 mg/dL Final     BUN   Date Value Ref Range Status   08/22/2024 8 8 - 23 mg/dL Final     Creatinine   Date Value Ref Range Status   08/22/2024 0.9 0.5 - 1.4 mg/dL Final     Calcium   Date Value Ref Range Status   08/22/2024 11.0 (H) 8.7 - 10.5 mg/dL Final     Total Protein   Date Value Ref Range Status   08/22/2024 7.6 6.0 - 8.4 g/dL Final     Albumin   Date Value Ref Range Status   08/22/2024 3.4 (L) 3.5 - 5.2 g/dL Final     Total Bilirubin   Date Value Ref Range Status   08/22/2024 0.6 0.1 - 1.0 mg/dL Final     Comment:     For infants and newborns, interpretation of results should be based  on gestational age, weight and in agreement with clinical  observations.    Premature Infant recommended reference ranges:  Up to 24 hours.............<8.0 mg/dL  Up to 48 hours............<12.0 mg/dL  3-5 days..................<15.0 mg/dL  6-29 days.................<15.0 mg/dL       Alkaline Phosphatase   Date Value Ref Range Status   08/22/2024 133 55 - 135 U/L Final     AST   Date Value Ref Range Status   08/22/2024 30 10 - 40 U/L Final     ALT   Date Value Ref Range Status   08/22/2024 17 10 - 44 U/L Final     Anion Gap   Date Value Ref Range Status   08/22/2024 8 8 - 16 mmol/L Final     eGFR if     Date Value Ref Range Status   02/15/2022 >60 >60 mL/min/1.73 m^2 Final     eGFR if non    Date Value Ref Range Status   02/15/2022 >60 >60 mL/min/1.73 m^2 Final     Comment:     Calculation used to obtain the estimated glomerular filtration  rate (eGFR) is the CKD-EPI equation.          Echo Saline Bubble? No    Left Ventricle: The left ventricle is normal in size. Normal wall   thickness. There is concentric remodeling. Septal motion is abnormal is   consistent with post-operative status. There is normal systolic function.   Ejection fraction by visual approximation is 55%.    Right Ventricle: Normal right ventricular cavity size. Systolic   function is normal.    Left Atrium: Left atrium is severely dilated.    Aortic Valve: Mildly calcified cusps. There is trace to mild aortic   regurgitation.    Mitral Valve: There is a bioprosthetic valve in the mitral position   that is well-seated.    Tricuspid Valve: There is mild to moderate regurgitation.    Pulmonary Artery: The estimated pulmonary artery systolic pressure is   42 mmHg.    IVC/SVC: Intermediate venous pressure at 8 mmHg.       ECOG SCORE                  Assessment/Plan:       Acute deep vein thrombosis (DVT) of brachial vein of left upper extremity  I reviewed independently the patient medical record including her laboratory radiologic findings.  The patient clinical picture is favoring an acute DVT that happened spontaneously while on therapeutic dose of Coumadin.  We will check with her cardiothoracic surgeon  if there is contraindication to switch her to a different anticoagulant such as Eliquis or Xarelto.    She will require 3 months of anticoagulation.  Lab drawn in 8/2024 showed a factor 5 Leiden gene mutation prothrombin gene mutation and anticardiolipin antibodies and beta 2 glycoprotein antibodies WNL.     The patient has completed 3 months of anticoagulation with Eliquis.  She was advised to stop  taking her anticoagulation for 2 weeks before having blood workup done to complete her hypercoagulable workup that will include protein C and protein S activity, antithrombin 3 level.  She will be seen back again in 1 month for a follow-up visit to discuss the results of her workup   She will be scheduled to undergo a left upper extremity duplex to assess if there is resolution of her blood count.    Long term use of anticoagulation  As above    History of mitral valve replacement   Off coumadin      Persistent hypercalcemia.    She will have a PTH and PTH related peptide ordered today.    Iron deficiency anemia:  Due to her chronic anticoagulation.    The patient was advised to continue  taking ferrous gluconate 1 tablet a day for a week then to increase it to  2 tablets a day.  The side effects of oral iron were discussed at length.  She was advised to take stool softener to prevent constipation.  We will monitor her response to therapy with repeat CBC CMP and ferritin      Overweight BMI 25.34  Patient was advised to exercise maintain healthy lifestyle and to lose weight.           Med Onc Chart Routing      Follow up with physician . November 6, 2024 to discuss  labs drawn on Ocotber 21, 2024- as below. Schedule to have an US left upper extremity duplex ASAP   Follow up with [facility]    Infusion scheduling note    Injection scheduling note    Labs CBC, CMP and ferritin   Scheduling:  Preferred lab:  Lab interval:  protein C and S ctivity and antithrombin 3 PTH PTHrp   Imaging    Pharmacy appointment    Other referrals                   Plan was discussed with the patient at length, and she verbalized understanding. Aleida was given an opportunity to ask questions that were answered to her satisfaction, and she was advised to call in the interval if any problems or questions arise.  Signed:  Ronel Marin MD   Hematology and Oncology  [facility] - HEMATOLOGY ONCOLOGY  [facility],  [place]

## 2024-10-08 ENCOUNTER — HOSPITAL ENCOUNTER (OUTPATIENT)
Dept: RADIOLOGY | Facility: HOSPITAL | Age: 74
Discharge: HOME | End: 2024-10-08
Attending: INTERNAL MEDICINE
Payer: MEDICARE

## 2024-10-08 DIAGNOSIS — I82.A12 ACUTE DEEP VEIN THROMBOSIS (DVT) OF AXILLARY VEIN OF LEFT UPPER EXTREMITY: ICD-10-CM

## 2024-10-08 PROCEDURE — 93971 EXTREMITY STUDY: CPT | Mod: TC,HCNC,PO,LT

## 2024-10-08 PROCEDURE — 93971 EXTREMITY STUDY: CPT | Mod: 26,HCNC,LT, | Performed by: RADIOLOGY

## 2024-10-26 DIAGNOSIS — K21.9 GASTROESOPHAGEAL REFLUX DISEASE, UNSPECIFIED WHETHER ESOPHAGITIS PRESENT: ICD-10-CM

## 2024-10-28 RX ORDER — OMEPRAZOLE 40 MG/1
40 CAPSULE, DELAYED RELEASE ORAL
Qty: 90 CAPSULE | Refills: 3 | Status: SHIPPED | OUTPATIENT
Start: 2024-10-28

## 2024-10-31 ENCOUNTER — TELEPHONE (OUTPATIENT)
Dept: HEMATOLOGY/ONCOLOGY | Facility: CLINIC | Age: 74
End: 2024-10-31
Payer: MEDICAID

## 2024-10-31 DIAGNOSIS — E78.00 HYPERCHOLESTEROLEMIA: Primary | ICD-10-CM

## 2024-10-31 RX ORDER — PRAVASTATIN SODIUM 40 MG/1
40 TABLET ORAL NIGHTLY
Qty: 90 TABLET | Refills: 1 | Status: SHIPPED | OUTPATIENT
Start: 2024-10-31

## 2024-11-01 RX ORDER — FLUTICASONE PROPIONATE 50 MCG
2 SPRAY, SUSPENSION (ML) NASAL
Qty: 48 G | Refills: 3 | Status: SHIPPED | OUTPATIENT
Start: 2024-11-01

## 2024-11-06 ENCOUNTER — E-CONSULT (OUTPATIENT)
Dept: ENDOCRINOLOGY | Facility: CLINIC | Age: 74
End: 2024-11-06
Payer: MEDICAID

## 2024-11-06 ENCOUNTER — OFFICE VISIT (OUTPATIENT)
Dept: HEMATOLOGY/ONCOLOGY | Facility: CLINIC | Age: 74
End: 2024-11-06
Payer: MEDICARE

## 2024-11-06 ENCOUNTER — DOCUMENT SCAN (OUTPATIENT)
Dept: HOME HEALTH SERVICES | Facility: HOSPITAL | Age: 74
End: 2024-11-06
Payer: MEDICAID

## 2024-11-06 VITALS
HEIGHT: 66 IN | HEART RATE: 88 BPM | BODY MASS INDEX: 24.59 KG/M2 | SYSTOLIC BLOOD PRESSURE: 141 MMHG | DIASTOLIC BLOOD PRESSURE: 73 MMHG | TEMPERATURE: 98 F | WEIGHT: 153 LBS | RESPIRATION RATE: 17 BRPM

## 2024-11-06 DIAGNOSIS — E21.3 HYPERPARATHYROIDISM: ICD-10-CM

## 2024-11-06 DIAGNOSIS — E83.52 HYPERCALCEMIA: Primary | ICD-10-CM

## 2024-11-06 DIAGNOSIS — I82.A12 ACUTE DEEP VEIN THROMBOSIS (DVT) OF AXILLARY VEIN OF LEFT UPPER EXTREMITY: Primary | ICD-10-CM

## 2024-11-06 DIAGNOSIS — Z95.2 HX OF MITRAL VALVE REPLACEMENT: ICD-10-CM

## 2024-11-06 DIAGNOSIS — E83.52 HYPERCALCEMIA: ICD-10-CM

## 2024-11-06 DIAGNOSIS — Z79.01 LONG TERM (CURRENT) USE OF ANTICOAGULANTS: ICD-10-CM

## 2024-11-06 DIAGNOSIS — D50.8 IRON DEFICIENCY ANEMIA SECONDARY TO INADEQUATE DIETARY IRON INTAKE: ICD-10-CM

## 2024-11-06 PROCEDURE — 99999 PR PBB SHADOW E&M-EST. PATIENT-LVL III: CPT | Mod: PBBFAC,HCNC,, | Performed by: INTERNAL MEDICINE

## 2024-11-06 PROCEDURE — 99213 OFFICE O/P EST LOW 20 MIN: CPT | Mod: PBBFAC,PN | Performed by: INTERNAL MEDICINE

## 2024-11-06 PROCEDURE — 99214 OFFICE O/P EST MOD 30 MIN: CPT | Mod: S$PBB,,, | Performed by: INTERNAL MEDICINE

## 2024-11-06 RX ORDER — HEPARIN 100 UNIT/ML
500 SYRINGE INTRAVENOUS
Status: CANCELLED | OUTPATIENT
Start: 2024-11-06

## 2024-11-06 RX ORDER — EPINEPHRINE 0.3 MG/.3ML
0.3 INJECTION SUBCUTANEOUS ONCE AS NEEDED
Status: CANCELLED | OUTPATIENT
Start: 2024-11-06

## 2024-11-06 RX ORDER — DIPHENHYDRAMINE HYDROCHLORIDE 50 MG/ML
50 INJECTION INTRAMUSCULAR; INTRAVENOUS ONCE AS NEEDED
Status: CANCELLED | OUTPATIENT
Start: 2024-11-06

## 2024-11-06 RX ORDER — SODIUM CHLORIDE 0.9 % (FLUSH) 0.9 %
10 SYRINGE (ML) INJECTION
Status: CANCELLED | OUTPATIENT
Start: 2024-11-06

## 2024-11-06 NOTE — PROGRESS NOTES
Subjective:       Name: Aleida Bliss  : 1950  MRN: [id number]    Chief Complaint   Patient presents with    Deep Vein Thrombosis        Patient is in clinic with her     HPI: Aleida Bliss is a 74 y.o. female presents to discuss the evaluation of the left upper extremity Deep Vein Thrombosis    She is reporting pain in tired and having an unsteady gait.  She has stopped taking Eliquis.    The patient denies CP, cough, SOB, abdominal pain, nausea, vomiting, constipation.  The patient denies fever, chills, night sweats, weight loss, new lumps or bumps, easy bruising or bleeding.      PREVIOUS HISTORY:  She has a past medical history of CAD, long-term anticoagulant use on warfarin, mitral valve replacement went to the ER on Savita 15, 2024 complaining of left UE swelling that started a week ago.    US of the left upper upper extremity on 6/15/2024 showed:  small occlusive thrombus in a branch of the brachial vein in the antecubital fossa.     The patient denies any personal history of previous thromboembolic events, any recent history of traveling, prolonged immobilization, surgery or trauma. She denies any family history of thromboembolic disease . Her mother had 2 miscarriages in the first and third trimester.  She is up-to-date for his colonoscopy.She is up-to-date for her mammogram.      Oncology History    No history exists.        Past Medical History:   Diagnosis Date    Arthritis     Coronary artery disease     GERD (gastroesophageal reflux disease)     Hypertension     Sleep apnea     uses C-PAP    SOB (shortness of breath)     Stroke     TIA approx        Past Surgical History:   Procedure Laterality Date    CATARACT EXTRACTION EXTRACAPSULAR W/ INTRAOCULAR LENS IMPLANTATION      CATHETERIZATION OF BOTH LEFT AND RIGHT HEART N/A 02/15/2022    Procedure: CATHETERIZATION, HEART, BOTH LEFT AND RIGHT;  Surgeon: Cuong Hinkle MD;  Location: [facility] CATH;  Service: Cardiology;  Laterality: N/A;     CATHETERIZATION OF BOTH LEFT AND RIGHT HEART  2/22/2024    Procedure: Right / Left heart cath;  Surgeon: Cuong Hinkle MD;  Location: [facility] CATH;  Service: Cardiology;;    CHOLECYSTECTOMY      COLONOSCOPY      COLONOSCOPY N/A 3/15/2022    Procedure: COLONOSCOPY;  Surgeon: Varun Toro MD;  Location: [facility] ENDO;  Service: Endoscopy;  Laterality: N/A;    CORONARY ANGIOGRAPHY N/A 2/22/2024    Procedure: ANGIOGRAM, CORONARY ARTERY;  Surgeon: Cuong Hinkle MD;  Location: [facility] CATH;  Service: Cardiology;  Laterality: N/A;    EXCLUSION, LEFT ATRIAL APPENDAGE, OPEN, AS PART OF OPEN CHEST SURGERY N/A 4/15/2024    Procedure: EXCLUSION, LEFT ATRIAL APPENDAGE, OPEN, AS PART OF OPEN CHEST SURGERY;  Surgeon: Angela Mcdowell MD;  Location: [facility] OR;  Service: Cardiothoracic;  Laterality: N/A;    HYSTERECTOMY      MITRAL VALVE REPLACEMENT N/A 4/15/2024    Procedure: REPLACEMENT, MITRAL VALVE;  Surgeon: Angela Mcdowell MD;  [facility];  Service: Cardiothoracic;  Laterality: N/A;    TRANSESOPHAGEAL ECHOCARDIOGRAPHY N/A 4/15/2024    Procedure: ECHOCARDIOGRAM, TRANSESOPHAGEAL;  Surgeon: Angela Mcdowell MD;  Location: [facility] OR;  Service: Cardiothoracic;  Laterality: N/A;       Family History   Problem Relation Name Age of Onset    Heart disease Mother      Cancer Father          prostate       Social History     Socioeconomic History    Marital status:    Tobacco Use    Smoking status: Never     Passive exposure: Never    Smokeless tobacco: Never   Substance and Sexual Activity    Alcohol use: No    Drug use: No    Sexual activity: Not Currently     Social Drivers of Health     Financial Resource Strain: Medium Risk (4/17/2019)    Received from [facility] (and [facility] [place], [place], and [place] prior to 7/1/2021), [facility] (and [facility] prior to 7/1/2021)    Financial Resource Strain     How hard is it for you to pay for the very basics like food,  "housing, medical care, and heating?: Somewhat hard   Food Insecurity: No Food Insecurity (4/16/2024)    Hunger Vital Sign     Worried About Running Out of Food in the Last Year: Never true     Ran Out of Food in the Last Year: Never true   Transportation Needs: Unknown (4/16/2024)    PRAPARE - Transportation     Lack of Transportation (Medical): No   Housing Stability: Low Risk  (4/16/2024)    Housing Stability Vital Sign     Unable to Pay for Housing in the Last Year: No     Homeless in the Last Year: No       Review of patient's allergies indicates:   Allergen Reactions    Thiazides Other (See Comments)     Elevates calcium    Lisinopril Other (See Comments)     Other reaction(s): Cough      Cyclobenzaprine Rash    Losartan Nausea Only     Other reaction(s): Unknown         Review of Systems   Constitutional:  Negative for fatigue and fever.   HENT:  Negative for mouth sores and sore throat.    Eyes:  Negative for photophobia and visual disturbance.   Respiratory:  Negative for cough and shortness of breath.    Cardiovascular:  Negative for chest pain.   Gastrointestinal:  Negative for abdominal pain, constipation and diarrhea.   Genitourinary:  Negative for dysuria and hematuria.   Musculoskeletal:  Negative for arthralgias and joint swelling.   Neurological:  Negative for dizziness, weakness and light-headedness.   Hematological:  Does not bruise/bleed easily.   Psychiatric/Behavioral:  Negative for sleep disturbance. The patient is not nervous/anxious.             Objective:     Vitals:    11/06/24 0840   BP: (!) 141/73   BP Location: Left arm   Patient Position: Sitting   Pulse: 88   Resp: 17   Temp: 98 °F (36.7 °C)   TempSrc: Temporal   Weight: 69.4 kg (153 lb)   Height: 5' 6" (1.676 m)        Physical Exam  Vitals reviewed.   Constitutional:       Appearance: Normal appearance.   HENT:      Head: Normocephalic and atraumatic.   Eyes:      General: No scleral icterus.     Pupils: Pupils are equal, round, and " reactive to light.   Cardiovascular:      Rate and Rhythm: Normal rate and regular rhythm.      Pulses: Normal pulses.      Heart sounds: Normal heart sounds.   Pulmonary:      Effort: Pulmonary effort is normal.      Breath sounds: Normal breath sounds.   Abdominal:      General: Bowel sounds are normal. There is no distension.   Musculoskeletal:         General: No swelling.   Lymphadenopathy:      Cervical: No cervical adenopathy.   Skin:     General: Skin is warm.      Findings: No rash.   Neurological:      General: No focal deficit present.      Mental Status: She is alert and oriented to person, place, and time.      Cranial Nerves: No cranial nerve deficit.      Motor: No weakness.   Psychiatric:         Mood and Affect: Mood normal.         Behavior: Behavior normal.                Current Outpatient Medications on File Prior to Visit   Medication Sig    atenoloL (TENORMIN) 50 MG tablet TAKE 1 TABLET ONE TIME DAILY    azelastine (ASTELIN) 137 mcg (0.1 %) nasal spray 1 spray (137 mcg total) by Nasal route 2 (two) times daily.    ergocalciferol (ERGOCALCIFEROL) 50,000 unit Cap TAKE 1 CAPSULE BY MOUTH EVERY MONDAY.    fluticasone propionate (FLONASE) 50 mcg/actuation nasal spray USE 2 SPRAYS IN EACH NOSTRIL ONE TIME DAILY    furosemide (LASIX) 40 MG tablet Take 1 tablet (40 mg total) by mouth once daily.    iron ag,dl-A-ON9-B12-Zn-sa-sto (NIFEREX, SUMALATE-QUATREFOLIC,) 150 mg iron- 60 mg-1 mg Tab Take 1 tablet by mouth once daily.    magnesium glycinate 100 mg Tab Take 1 tablet by mouth nightly.    magnesium oxide (MAG-OX) 400 mg (241.3 mg magnesium) tablet TAKE 1 TABLET EVERY DAY    montelukast (SINGULAIR) 10 mg tablet TAKE 1 TABLET EVERY EVENING    omeprazole (PRILOSEC) 40 MG capsule TAKE 1 CAPSULE EVERY DAY    pravastatin (PRAVACHOL) 40 MG tablet Take 1 tablet (40 mg total) by mouth every evening.    sildenafil (REVATIO) 20 mg Tab Take 1 tablet (20 mg total) by mouth 3 (three) times daily.    sucralfate  (CARAFATE) 1 gram tablet Take 1 g by mouth every evening.    triamcinolone acetonide 0.1% (KENALOG) 0.1 % cream APPLY TOPICALLY 2 (TWO) TIMES DAILY.     No current facility-administered medications on file prior to visit.       CBC:  Lab Results   Component Value Date    WBC 5.31 10/22/2024    HGB 11.0 (L) 10/22/2024    HCT 37.1 10/22/2024    MCV 84 10/22/2024     10/22/2024         CMP:  Sodium   Date Value Ref Range Status   10/22/2024 140 136 - 145 mmol/L Final     Potassium   Date Value Ref Range Status   10/22/2024 3.7 3.5 - 5.1 mmol/L Final     Comment:     Anion Gap reference range revised on 4/28/2023     Chloride   Date Value Ref Range Status   10/22/2024 106 95 - 110 mmol/L Final     CO2   Date Value Ref Range Status   10/22/2024 23 22 - 31 mmol/L Final     Glucose   Date Value Ref Range Status   10/22/2024 97 70 - 110 mg/dL Final     Comment:     The ADA recommends the following guidelines for fasting glucose:    Normal:       less than 100 mg/dL    Prediabetes:  100 mg/dL to 125 mg/dL    Diabetes:     126 mg/dL or higher       BUN   Date Value Ref Range Status   10/22/2024 9 7 - 18 mg/dL Final     Creatinine   Date Value Ref Range Status   10/22/2024 0.81 0.50 - 1.40 mg/dL Final     Calcium   Date Value Ref Range Status   10/22/2024 10.9 (H) 8.4 - 10.2 mg/dL Final     Total Protein   Date Value Ref Range Status   10/22/2024 7.3 6.0 - 8.4 g/dL Final     Albumin   Date Value Ref Range Status   10/22/2024 3.4 (L) 3.5 - 5.2 g/dL Final     Total Bilirubin   Date Value Ref Range Status   10/22/2024 0.7 0.2 - 1.3 mg/dL Final     Alkaline Phosphatase   Date Value Ref Range Status   10/22/2024 134 38 - 145 U/L Final     AST   Date Value Ref Range Status   10/22/2024 41 (H) 14 - 36 U/L Final     ALT   Date Value Ref Range Status   10/22/2024 18 0 - 35 U/L Final     Anion Gap   Date Value Ref Range Status   10/22/2024 11 5 - 12 mmol/L Final     Comment:     Anion Gap reference range revised on 4/28/2023      eGFR if    Date Value Ref Range Status   02/15/2022 >60 >60 mL/min/1.73 m^2 Final     eGFR if non    Date Value Ref Range Status   02/15/2022 >60 >60 mL/min/1.73 m^2 Final     Comment:     Calculation used to obtain the estimated glomerular filtration  rate (eGFR) is the CKD-EPI equation.          US LUE VENOUS  Narrative: EXAMINATION:  US UPPER EXTREMITY VEINS LEFT    CLINICAL HISTORY:  left upper extremity swelling and pain; Acute embolism and thrombosis of left axillary vein    TECHNIQUE:  Duplex and color flow Doppler evaluation and dynamic compression was performed of the left upper extremity veins.    COMPARISON:  06/15/2024    FINDINGS:  Central veins: The internal jugular, subclavian, and axillary veins are patent and free of thrombus.    Arm veins: The brachial, basilic, and cephalic veins are patent and compressible.    Contralateral subclavian/internal jugular veins: The right subclavian and internal jugular veins are patent and free of thrombus.    Other findings: None.  Impression: No thrombus in central veins of the left upper extremity.    Electronically signed by: Arvind Flores  Date:    10/08/2024  Time:    15:11       ECOG SCORE    1 - Restricted in strenuous activity-ambulatory and able to carry out work of a light nature              Assessment/Plan:       Acute deep vein thrombosis (DVT) of brachial vein of left upper extremity  I reviewed independently the patient medical record including her laboratory radiologic findings.  The patient clinical picture is favoring an acute DVT that happened spontaneously while on therapeutic dose of Coumadin.  We will check with her cardiothoracic surgeon  if there is contraindication to switch her to a different anticoagulant such as Eliquis or Xarelto.    She will require 3 months of anticoagulation.  Lab drawn in 8/2024 showed a factor 5 Leiden gene mutation prothrombin gene mutation and anticardiolipin antibodies  and beta 2 glycoprotein antibodies WNL.     The patient has completed 3 months of anticoagulation with Eliquis.    -the rest of her hypercoagulable workup done on October 22, 2024 showed a low level of protein C and protein S activity, antithrombin 3 level.  Will re-evaluate if this is a true low-level of her protein C and protein S and antithrombin 3 which is very unusual.  She will have those levels repeated in 3 months from now.  The repeat ultrasound of her left upper extremity showed no evidence of DVT or residual thrombus.    Long term use of anticoagulation  As above    History of mitral valve replacement   Off coumadin      Persistent hypercalcemia.    PTH was elevated at 1 35.5  and PTH related peptide was normal.  And he consult was sent to Endocrinology in view of her abnormal PTH and persistently elevated calcium.  This could also contribute to her fatigue.      Iron deficiency anemia:  The patient has been taking ferrous gluconate 2  tablets a day.  Blood workup done on October 22, 2024 showed a hemoglobin of 11 with a ferritin of 34.  Since the patient is symptomatic she will be switched to IV iron with Venofer 300 mg IV q.week x3.  She will be seen back again in 3 months for a follow-up visit with repeat CBC CMP and ferritin.      Overweight BMI 25.34  Patient was advised to exercise maintain healthy lifestyle and to lose weight.           Med Onc Chart Routing      Follow up with physician 3 months. with repeat CBC CMP  Ferritin protein C S and antithrombin 3. e consult to endocrinology   Follow up with ANTONY    Infusion scheduling note   she will receive 3 doses of IV venofer 300 mg IV q week.   Injection scheduling note    Labs    Imaging    Pharmacy appointment    Other referrals                   Plan was discussed with the patient at length, and she verbalized understanding. Aleida was given an opportunity to ask questions that were answered to her satisfaction, and she was advised to call in the  interval if any problems or questions arise.  Signed:  Ronel Marin MD   Hematology and Oncology  [facility] - HEMATOLOGY ONCOLOGY  [facility], [place]

## 2024-11-06 NOTE — CONSULTS
[facility]  Response for E-Consult     Patient Name: Aleida Bliss  MRN: [id number]  Primary Care Provider: Sebastian Montiel III, PA-C   Requesting Provider: Ronel Marin MD  E-Consult to Endocrinology  Consult performed by: Aleida Shaffer MD  Consult ordered by: Ronel Marin MD          Unfortunately, this eConsult has been declined due to: Established Patient    Established Patient:  This eConsult submission cannot be completed at this time. This is an established patient in our specialty and we are currently managing the problem for which you have sent a question about. The eConsult tool is not meant to be used for problems which are currently being managed by a specialist. In order to further assist you, please contact our specialty through the usual channels.    we will get her back into clinic for visit to discuss surgery vs med management.        Thank you for this eConsult referral.     MD [facility]

## 2024-11-08 ENCOUNTER — TELEPHONE (OUTPATIENT)
Dept: FAMILY MEDICINE | Facility: CLINIC | Age: 74
End: 2024-11-08
Payer: MEDICAID

## 2024-11-08 NOTE — TELEPHONE ENCOUNTER
----- Message from Aleida Shaffer MD sent at 11/8/2024  2:09 PM CST -----  Regarding: f/u  Aron Garrett,    Can you please get this pt scheduled with any available provider? She is established with us but has not followed up. We need to discuss surgery vs medical management for hyperparathyroidism.    Thank you,  Aleida

## 2024-11-09 NOTE — CONSULTS
[facility]  Response for E-Consult     Patient Name: Aleida Bliss  MRN: [id number]  Primary Care Provider: Sebastian Montiel III, PA-C   Requesting Provider: Ronel Marin MD  Consults    Unfortunately, this eConsult has been declined due to: Established Patient    Established Patient:  This eConsult submission cannot be completed at this time. This is an established patient in our specialty and we are currently managing the problem for which you have sent a question about. The eConsult tool is not meant to be used for problems which are currently being managed by a specialist. In order to further assist you, please contact our specialty through the usual channels.        Thank you for this eConsult referral.     Ali M Amkieh, MD  [facility]

## 2024-11-09 NOTE — CONSULTS
[facility]  Response for E-Consult     Patient Name: Aleida Bliss  MRN: [id number]  Primary Care Provider: Sebastian Montiel III, PA-C   Requesting Provider: Ronel Marin MD  Consults    Unfortunately, this eConsult has been declined due to: Established Patient    Established Patient:  This eConsult submission cannot be completed at this time. This is an established patient in our specialty and we are currently managing the problem for which you have sent a question about. The eConsult tool is not meant to be used for problems which are currently being managed by a specialist. In order to further assist you, please contact our specialty through the usual channels.        Thank you for this eConsult referral.     Ali M Amkieh, MD  [facility]

## 2024-11-09 NOTE — CONSULTS
[facility]  Response for E-Consult     Patient Name: Aleida Bliss  MRN: [id number]  Primary Care Provider: Sebastian Montiel III, PA-C   Requesting Provider: Ronel Marin MD  Consults    Recommendation:  Acceptable CV risk    Contingency that warrants a repeat eConsult or referral:      Total time of Consultation: 5 minute    I did not speak to the requesting provider verbally about this.     *This eConsult is based on the clinical data available to me and is furnished without benefit of a physical examination. The eConsult will need to be interpreted in light of any clinical issues or changes in patient status not available to me at the time of filing this eConsults. Significant changes in patient condition or level of acuity should result in immediate formal consultation and reevaluation. Please alert me if you have further questions.    Thank you for this eConsult referral.     Ali M Amkieh, MD  [facility]

## 2024-11-11 ENCOUNTER — TELEPHONE (OUTPATIENT)
Dept: PRIMARY CARE CLINIC | Facility: CLINIC | Age: 74
End: 2024-11-11
Payer: MEDICARE

## 2024-11-11 NOTE — TELEPHONE ENCOUNTER
Pt notified regarding parathyroid surgery. She has an appt on 12/2/24 and wishes to discuss during scheduled appt. No other concerns were voiced

## 2024-11-12 ENCOUNTER — TELEPHONE (OUTPATIENT)
Dept: ENDOCRINOLOGY | Facility: CLINIC | Age: 74
End: 2024-11-12
Payer: MEDICARE

## 2024-11-12 NOTE — TELEPHONE ENCOUNTER
----- Message from Med Assistant Kiser sent at 11/12/2024  1:56 PM CST -----  Regarding: RE: f/u  Sure no problem Kina Zambrano Patient is scheduled please call patient and let them know thank you.  ----- Message -----  From: Aleida Shaffer MD  Sent: 11/8/2024   2:10 PM CST  To: Sebastian Montiel III, PA-C; #  Subject: f/u                                              Aron Garrett,    Can you please get this pt scheduled with any available provider? She is established with us but has not followed up. We need to discuss surgery vs medical management for hyperparathyroidism.    Thank you,  Aleida

## 2024-11-12 NOTE — TELEPHONE ENCOUNTER
Called pt and let her know about her date time and location of her appt. Pt verbalized understanding and also requested an appt letter to be sent. Appt letter was sent.

## 2024-11-13 ENCOUNTER — DOCUMENT SCAN (OUTPATIENT)
Dept: HOME HEALTH SERVICES | Facility: HOSPITAL | Age: 74
End: 2024-11-13
Payer: MEDICARE

## 2024-11-14 DIAGNOSIS — I27.20 SEVERE PULMONARY HYPERTENSION: ICD-10-CM

## 2024-11-14 RX ORDER — SILDENAFIL CITRATE 20 MG/1
20 TABLET ORAL 3 TIMES DAILY
Qty: 270 TABLET | Refills: 3 | Status: SHIPPED | OUTPATIENT
Start: 2024-11-14 | End: 2025-11-14

## 2024-11-14 NOTE — TELEPHONE ENCOUNTER
----- Message from Farheen sent at 11/14/2024  3:20 PM CST -----  Regarding: Refill  Contact: Pt  [phone]  Rx Name:  sildenafil (REVATIO) 20 mg Tab      Preferred Pharmacy with number: .  [facility] - [place], [place] - [address]  [address] [address]  Phone: [phone] Fax: [phone]    or        [facility]/pharmacy #[id number] [address] [address]  Phone: [phone] Fax: [phone]    Ordering Provider: Cuong Hinkle MD    Contact preference:  [phone]    Comments/Notes: Requesting refill  would like home deliver if not available send to [facility] please call to give update on hoe pt will receive medication  pt only have enough for 2 days

## 2024-11-25 ENCOUNTER — INFUSION (OUTPATIENT)
Dept: INFUSION THERAPY | Facility: HOSPITAL | Age: 74
End: 2024-11-25
Attending: INTERNAL MEDICINE
Payer: MEDICARE

## 2024-11-25 VITALS
HEART RATE: 77 BPM | HEIGHT: 66 IN | BODY MASS INDEX: 24.06 KG/M2 | TEMPERATURE: 98 F | SYSTOLIC BLOOD PRESSURE: 173 MMHG | RESPIRATION RATE: 18 BRPM | OXYGEN SATURATION: 95 % | DIASTOLIC BLOOD PRESSURE: 81 MMHG | WEIGHT: 149.69 LBS

## 2024-11-25 DIAGNOSIS — D50.8 IRON DEFICIENCY ANEMIA SECONDARY TO INADEQUATE DIETARY IRON INTAKE: Primary | ICD-10-CM

## 2024-11-25 PROCEDURE — 96365 THER/PROPH/DIAG IV INF INIT: CPT | Mod: HCNC,PN

## 2024-11-25 PROCEDURE — 63600175 PHARM REV CODE 636 W HCPCS: Mod: HCNC,PN | Performed by: INTERNAL MEDICINE

## 2024-11-25 PROCEDURE — 25000003 PHARM REV CODE 250: Mod: HCNC,PN | Performed by: INTERNAL MEDICINE

## 2024-11-25 RX ORDER — SODIUM CHLORIDE 0.9 % (FLUSH) 0.9 %
10 SYRINGE (ML) INJECTION
OUTPATIENT
Start: 2024-12-02

## 2024-11-25 RX ORDER — EPINEPHRINE 0.3 MG/.3ML
0.3 INJECTION SUBCUTANEOUS ONCE AS NEEDED
Status: DISCONTINUED | OUTPATIENT
Start: 2024-11-25 | End: 2024-11-25 | Stop reason: HOSPADM

## 2024-11-25 RX ORDER — DIPHENHYDRAMINE HYDROCHLORIDE 50 MG/ML
50 INJECTION INTRAMUSCULAR; INTRAVENOUS ONCE AS NEEDED
Status: DISCONTINUED | OUTPATIENT
Start: 2024-11-25 | End: 2024-11-25 | Stop reason: HOSPADM

## 2024-11-25 RX ORDER — SODIUM CHLORIDE 0.9 % (FLUSH) 0.9 %
10 SYRINGE (ML) INJECTION
Status: DISCONTINUED | OUTPATIENT
Start: 2024-11-25 | End: 2024-11-25 | Stop reason: HOSPADM

## 2024-11-25 RX ORDER — HEPARIN 100 UNIT/ML
500 SYRINGE INTRAVENOUS
OUTPATIENT
Start: 2024-12-02

## 2024-11-25 RX ORDER — EPINEPHRINE 0.3 MG/.3ML
0.3 INJECTION SUBCUTANEOUS ONCE AS NEEDED
OUTPATIENT
Start: 2024-12-02

## 2024-11-25 RX ORDER — DIPHENHYDRAMINE HYDROCHLORIDE 50 MG/ML
50 INJECTION INTRAMUSCULAR; INTRAVENOUS ONCE AS NEEDED
OUTPATIENT
Start: 2024-12-02

## 2024-11-25 RX ADMIN — SODIUM CHLORIDE: 9 INJECTION, SOLUTION INTRAVENOUS at 01:11

## 2024-11-25 RX ADMIN — SODIUM CHLORIDE 125 MG: 9 INJECTION, SOLUTION INTRAVENOUS at 01:11

## 2024-11-25 NOTE — PLAN OF CARE
Problem: Adult Inpatient Plan of Care  Goal: Plan of Care Review  Outcome: Progressing  Flowsheets (Taken 11/25/2024 1430)  Plan of Care Reviewed With: patient   Pt tolerated ferrlecit infusion well.  No adverse reaction noted.   Pt observed for 30min post infusion.  IV flushed with NS and D/C per protocol.  Patient left clinic in no acute distress.

## 2024-11-25 NOTE — PLAN OF CARE
Problem: Adult Inpatient Plan of Care  Goal: Patient-Specific Goal (Individualized)  Outcome: Progressing  Flowsheets (Taken 11/25/2024 1304)  Individualized Care Needs: recliner, warm blanket  Anxieties, Fears or Concerns: first iron  Patient/Family-Specific Goals (Include Timeframe): no s/s of rx with tx today     Problem: Fatigue  Goal: Improved Activity Tolerance  Outcome: Progressing  Intervention: Promote Improved Energy  Flowsheets (Taken 11/25/2024 1304)  Fatigue Management: frequent rest breaks encouraged  Sleep/Rest Enhancement: regular sleep/rest pattern promoted  Activity Management: Ambulated -L4  Environmental Support: rest periods encouraged

## 2024-11-26 DIAGNOSIS — I27.20 SEVERE PULMONARY HYPERTENSION: ICD-10-CM

## 2024-11-26 RX ORDER — SILDENAFIL CITRATE 20 MG/1
20 TABLET ORAL 3 TIMES DAILY
Qty: 270 TABLET | Refills: 3 | OUTPATIENT
Start: 2024-11-26 | End: 2025-11-26

## 2024-11-26 RX ORDER — SILDENAFIL CITRATE 20 MG/1
20 TABLET ORAL 3 TIMES DAILY
Qty: 270 TABLET | Refills: 3 | Status: ACTIVE | OUTPATIENT
Start: 2024-11-26 | End: 2025-11-26

## 2024-11-26 NOTE — TELEPHONE ENCOUNTER
----- Message from Pharmacist Dolores sent at 11/26/2024  9:41 AM CST -----  Regarding: Revatio  Hello,    Please send refill to [facility] for Revatio 20 mg take 1 tablet (20 mg total) by mouth 3 (three) times daily.electronically. Refills were printed in error. Thanks in advance.    Dolores Marroquin, Adryan  Clinical Pharmacist  [facility]   Phone: [phone]  Fax: [phone]

## 2024-12-02 ENCOUNTER — INFUSION (OUTPATIENT)
Dept: INFUSION THERAPY | Facility: HOSPITAL | Age: 74
End: 2024-12-02
Attending: INTERNAL MEDICINE
Payer: MEDICARE

## 2024-12-02 ENCOUNTER — OFFICE VISIT (OUTPATIENT)
Dept: PRIMARY CARE CLINIC | Facility: CLINIC | Age: 74
End: 2024-12-02
Payer: MEDICARE

## 2024-12-02 ENCOUNTER — LAB VISIT (OUTPATIENT)
Dept: LAB | Facility: HOSPITAL | Age: 74
End: 2024-12-02
Attending: PHYSICIAN ASSISTANT
Payer: MEDICARE

## 2024-12-02 VITALS
RESPIRATION RATE: 18 BRPM | WEIGHT: 147.06 LBS | BODY MASS INDEX: 23.64 KG/M2 | OXYGEN SATURATION: 96 % | HEIGHT: 66 IN | TEMPERATURE: 98 F | HEART RATE: 75 BPM | DIASTOLIC BLOOD PRESSURE: 74 MMHG | SYSTOLIC BLOOD PRESSURE: 135 MMHG

## 2024-12-02 VITALS
OXYGEN SATURATION: 95 % | SYSTOLIC BLOOD PRESSURE: 136 MMHG | WEIGHT: 147.06 LBS | RESPIRATION RATE: 18 BRPM | BODY MASS INDEX: 23.73 KG/M2 | HEART RATE: 87 BPM | DIASTOLIC BLOOD PRESSURE: 88 MMHG

## 2024-12-02 DIAGNOSIS — D50.8 IRON DEFICIENCY ANEMIA SECONDARY TO INADEQUATE DIETARY IRON INTAKE: Primary | ICD-10-CM

## 2024-12-02 DIAGNOSIS — R41.82 ALTERED MENTAL STATUS, UNSPECIFIED ALTERED MENTAL STATUS TYPE: Primary | ICD-10-CM

## 2024-12-02 DIAGNOSIS — M25.512 ACUTE PAIN OF LEFT SHOULDER: ICD-10-CM

## 2024-12-02 DIAGNOSIS — R41.82 ALTERED MENTAL STATUS, UNSPECIFIED ALTERED MENTAL STATUS TYPE: ICD-10-CM

## 2024-12-02 DIAGNOSIS — I27.20 SEVERE PULMONARY HYPERTENSION: ICD-10-CM

## 2024-12-02 LAB
T3FREE SERPL-MCNC: 2.5 PG/ML (ref 2.3–4.2)
T4 FREE SERPL-MCNC: 0.95 NG/DL (ref 0.71–1.51)
TSH SERPL DL<=0.005 MIU/L-ACNC: 1.33 UIU/ML (ref 0.4–4)
VIT B12 SERPL-MCNC: 620 PG/ML (ref 210–950)

## 2024-12-02 PROCEDURE — 82607 VITAMIN B-12: CPT | Mod: HCNC | Performed by: PHYSICIAN ASSISTANT

## 2024-12-02 PROCEDURE — 3044F HG A1C LEVEL LT 7.0%: CPT | Mod: CPTII,S$GLB,, | Performed by: PHYSICIAN ASSISTANT

## 2024-12-02 PROCEDURE — 36415 COLL VENOUS BLD VENIPUNCTURE: CPT | Mod: HCNC,PO | Performed by: PHYSICIAN ASSISTANT

## 2024-12-02 PROCEDURE — 1126F AMNT PAIN NOTED NONE PRSNT: CPT | Mod: CPTII,S$GLB,, | Performed by: PHYSICIAN ASSISTANT

## 2024-12-02 PROCEDURE — 1101F PT FALLS ASSESS-DOCD LE1/YR: CPT | Mod: CPTII,S$GLB,, | Performed by: PHYSICIAN ASSISTANT

## 2024-12-02 PROCEDURE — 3079F DIAST BP 80-89 MM HG: CPT | Mod: CPTII,S$GLB,, | Performed by: PHYSICIAN ASSISTANT

## 2024-12-02 PROCEDURE — 3075F SYST BP GE 130 - 139MM HG: CPT | Mod: CPTII,S$GLB,, | Performed by: PHYSICIAN ASSISTANT

## 2024-12-02 PROCEDURE — 96365 THER/PROPH/DIAG IV INF INIT: CPT | Mod: HCNC,PN

## 2024-12-02 PROCEDURE — 84481 FREE ASSAY (FT-3): CPT | Mod: HCNC | Performed by: PHYSICIAN ASSISTANT

## 2024-12-02 PROCEDURE — 1159F MED LIST DOCD IN RCRD: CPT | Mod: CPTII,S$GLB,, | Performed by: PHYSICIAN ASSISTANT

## 2024-12-02 PROCEDURE — 99214 OFFICE O/P EST MOD 30 MIN: CPT | Mod: S$GLB,,, | Performed by: PHYSICIAN ASSISTANT

## 2024-12-02 PROCEDURE — 3288F FALL RISK ASSESSMENT DOCD: CPT | Mod: CPTII,S$GLB,, | Performed by: PHYSICIAN ASSISTANT

## 2024-12-02 PROCEDURE — 25000003 PHARM REV CODE 250: Mod: HCNC,PN | Performed by: INTERNAL MEDICINE

## 2024-12-02 PROCEDURE — 84439 ASSAY OF FREE THYROXINE: CPT | Mod: HCNC | Performed by: PHYSICIAN ASSISTANT

## 2024-12-02 PROCEDURE — 84443 ASSAY THYROID STIM HORMONE: CPT | Mod: HCNC | Performed by: PHYSICIAN ASSISTANT

## 2024-12-02 PROCEDURE — 63600175 PHARM REV CODE 636 W HCPCS: Mod: HCNC,PN | Performed by: INTERNAL MEDICINE

## 2024-12-02 PROCEDURE — 1160F RVW MEDS BY RX/DR IN RCRD: CPT | Mod: CPTII,S$GLB,, | Performed by: PHYSICIAN ASSISTANT

## 2024-12-02 RX ORDER — DIPHENHYDRAMINE HYDROCHLORIDE 50 MG/ML
50 INJECTION INTRAMUSCULAR; INTRAVENOUS ONCE AS NEEDED
OUTPATIENT
Start: 2024-12-09

## 2024-12-02 RX ORDER — MEGESTROL ACETATE 40 MG/1
40 TABLET ORAL DAILY
Qty: 90 TABLET | Refills: 1 | Status: ON HOLD | OUTPATIENT
Start: 2024-12-02 | End: 2025-12-02

## 2024-12-02 RX ORDER — HEPARIN 100 UNIT/ML
500 SYRINGE INTRAVENOUS
OUTPATIENT
Start: 2024-12-09

## 2024-12-02 RX ORDER — SILDENAFIL CITRATE 20 MG/1
20 TABLET ORAL 3 TIMES DAILY
Qty: 270 TABLET | Refills: 3 | Status: ON HOLD | OUTPATIENT
Start: 2024-12-02 | End: 2025-12-02

## 2024-12-02 RX ORDER — SODIUM CHLORIDE 0.9 % (FLUSH) 0.9 %
10 SYRINGE (ML) INJECTION
OUTPATIENT
Start: 2024-12-09

## 2024-12-02 RX ORDER — EPINEPHRINE 0.3 MG/.3ML
0.3 INJECTION SUBCUTANEOUS ONCE AS NEEDED
Status: DISCONTINUED | OUTPATIENT
Start: 2024-12-02 | End: 2024-12-02 | Stop reason: HOSPADM

## 2024-12-02 RX ORDER — SODIUM CHLORIDE 0.9 % (FLUSH) 0.9 %
10 SYRINGE (ML) INJECTION
Status: DISCONTINUED | OUTPATIENT
Start: 2024-12-02 | End: 2024-12-02 | Stop reason: HOSPADM

## 2024-12-02 RX ORDER — DIPHENHYDRAMINE HYDROCHLORIDE 50 MG/ML
50 INJECTION INTRAMUSCULAR; INTRAVENOUS ONCE AS NEEDED
Status: DISCONTINUED | OUTPATIENT
Start: 2024-12-02 | End: 2024-12-02 | Stop reason: HOSPADM

## 2024-12-02 RX ORDER — EPINEPHRINE 0.3 MG/.3ML
0.3 INJECTION SUBCUTANEOUS ONCE AS NEEDED
OUTPATIENT
Start: 2024-12-09

## 2024-12-02 RX ORDER — SILDENAFIL CITRATE 20 MG/1
20 TABLET ORAL 3 TIMES DAILY
Qty: 270 TABLET | Refills: 3 | Status: SHIPPED | OUTPATIENT
Start: 2024-12-02 | End: 2024-12-02 | Stop reason: SDUPTHER

## 2024-12-02 RX ADMIN — SODIUM CHLORIDE 125 MG: 9 INJECTION, SOLUTION INTRAVENOUS at 02:12

## 2024-12-02 NOTE — PLAN OF CARE
Problem: Adult Inpatient Plan of Care  Goal: Plan of Care Review  Outcome: Progressing  Flowsheets (Taken 12/2/2024 1540)  Plan of Care Reviewed With: patient  Goal: Patient-Specific Goal (Individualized)  Outcome: Progressing  Flowsheets (Taken 12/2/2024 1606)  Individualized Care Needs: recliner, blanket, snacks, TV  Anxieties, Fears or Concerns: none     Problem: Fatigue  Goal: Improved Activity Tolerance  Outcome: Progressing  Intervention: Promote Improved Energy  Flowsheets (Taken 12/2/2024 1540)  Fatigue Management: paced activity encouraged  Sleep/Rest Enhancement:   awakenings minimized   noise level reduced   natural light exposure provided   room darkened  Activity Management:   Ambulated -L4   Ambulated to bathroom - L4   Up in chair - L3  Environmental Support:   calm environment promoted   distractions minimized   rest periods encouraged   environmental consistency promoted   Pt. tolerated infusion well. VS stable. No concerns at this time. Future appointments discussed. Pt. D/C to home. Ambulated with cane.

## 2024-12-02 NOTE — PROGRESS NOTES
Subjective     Patient ID: Aleida Bliss is a 74 y.o. female.    Chief Complaint: Follow-up (Chest pain at night when changing positions, pain underneath armpit (left))    Patient is a 75 yo female here for a check up. She is here with her grand daughter.   Patient Active Problem List:     Mitral valve disorder     Chest pain with high risk of acute coronary syndrome     Bradycardia     Severe pulmonary hypertension     Severe mitral regurgitation     Elevated left ventricular end-diastolic pressure (LVEDP)     H/O: rheumatic fever     Overweight (BMI 25.0-29.9)     Primary hypertension     Hypercholesterolemia     Nonspecific abnormal electrocardiogram (ECG) (EKG)     Imbalance     Cervicalgia     Abnormal nuclear stress test     Moderate to severe mitral regurgitation     Coronary artery disease of native artery of native heart with stable angina pectoris     Gastroesophageal reflux disease     Vitamin D deficiency disease     Seasonal allergies     Mitral regurgitation and aortic stenosis     Pulmonary hypertension     Carotid artery plaque, left     Hypomagnesemia     Mild carotid artery disease     Multiple thyroid nodules     Hypercalcemia     Age-related osteoporosis without current pathological fracture     Encounter for pre-operative cardiovascular clearance     PVC (premature ventricular contraction)     DOMINIC on CPAP     Hyperparathyroidism     SOB (shortness of breath)     Hx of mitral valve replacement     Hx of mitral valve replacement with tissue graft     Long term (current) use of anticoagulants     Thrombocytopenia, unspecified     Acute deep vein thrombosis (DVT) of axillary vein of left upper extremity     Iron deficiency anemia     Valvular heart disease   Reviewed her current list conditions are stable.   She is concerned about her decreased food intake and weight loss.     Past Medical History:  No date: Arthritis  No date: Coronary artery disease  No date: GERD (gastroesophageal reflux  disease)  No date: Hypertension  No date: Sleep apnea      Comment:  uses C-PAP  No date: SOB (shortness of breath)  No date: Stroke      Comment:  TIA approx 2021        Review of Systems   Constitutional:  Positive for fatigue. Negative for chills and diaphoresis.   Respiratory:  Negative for chest tightness, shortness of breath and wheezing.    Cardiovascular:  Negative for chest pain.   Gastrointestinal:  Negative for abdominal pain, nausea and reflux.   Genitourinary: Negative.    Musculoskeletal:  Negative for arthralgias.   Neurological:  Negative for dizziness, syncope, numbness and headaches.   Hematological:  Negative for adenopathy.   Psychiatric/Behavioral:  Negative for agitation, confusion and dysphoric mood.           Objective     Physical Exam  Vitals reviewed.   Constitutional:       General: She is not in acute distress.     Appearance: She is not ill-appearing, toxic-appearing or diaphoretic.   HENT:      Head: Normocephalic and atraumatic.   Cardiovascular:      Rate and Rhythm: Normal rate and regular rhythm.      Pulses: Normal pulses.      Heart sounds: Normal heart sounds. No murmur heard.     No friction rub. No gallop.   Pulmonary:      Effort: Pulmonary effort is normal. No respiratory distress.      Breath sounds: Normal breath sounds. No stridor. No wheezing, rhonchi or rales.   Chest:      Chest wall: No tenderness.   Abdominal:      Palpations: Abdomen is soft.      Tenderness: There is no abdominal tenderness.   Neurological:      Mental Status: She is alert.   Psychiatric:      Comments: Seem a little more lethargic today.             Assessment and Plan     1. Altered mental status, unspecified altered mental status type  -     TSH; Future; Expected date: 12/02/2024  -     T3, Free; Future; Expected date: 12/02/2024  -     T4, Free; Future; Expected date: 12/02/2024  -     Vitamin B12; Future; Expected date: 12/02/2024  -     POCT Urinalysis(Instrument)    2. Severe pulmonary  hypertension  Comments:  START MEDS  Orders:  -     Discontinue: sildenafil (REVATIO) 20 mg Tab; Take 1 tablet (20 mg total) by mouth 3 (three) times daily.  Dispense: 270 tablet; Refill: 3  -     sildenafil (REVATIO) 20 mg Tab; Take 1 tablet (20 mg total) by mouth 3 (three) times daily.  Dispense: 270 tablet; Refill: 3    3. Acute pain of left shoulder  -     X-Ray Ribs 2 View Left; Future; Expected date: 12/02/2024  -     X-Ray Shoulder 2 or More Views Left; Future; Expected date: 12/02/2024    Other orders  -     megestroL (MEGACE) 40 MG Tab; Take 1 tablet (40 mg total) by mouth once daily.  Dispense: 90 tablet; Refill: 1        I spent 30 minutes on this encounter, time includes face-to-face, chart review, documentation, test review and orders.           Follow up in about 4 weeks (around 12/30/2024).

## 2024-12-03 ENCOUNTER — TELEPHONE (OUTPATIENT)
Dept: INFUSION THERAPY | Facility: HOSPITAL | Age: 74
End: 2024-12-03
Payer: MEDICARE

## 2024-12-03 ENCOUNTER — TELEPHONE (OUTPATIENT)
Dept: PRIMARY CARE CLINIC | Facility: CLINIC | Age: 74
End: 2024-12-03
Payer: MEDICARE

## 2024-12-03 NOTE — TELEPHONE ENCOUNTER
----- Message from Alberta sent at 12/3/2024  4:04 PM CST -----  Type: Needs Medical Advice  Who Called:  Joshua (daughter)  Symptoms (please be specific):    How long has patient had these symptoms:    Pharmacy name and phone #:    Best Call Back Number:   Additional Information: Joshua is requesting a call back to reschedule her Mom appts..   KAREN 3/16/[birth year]  Last seen: 6/29/21  Last refilled: 11/4/21 #60 RF:0  Medication:busPIRone (BUSPAR) 10 MG tablet  Take 1 tablet by mouth 2 times daily.   Last note reviewed:  Continue buspirone 5 mg twice daily.  She is taking the 2nd dose midday    Okay to send?

## 2024-12-03 NOTE — TELEPHONE ENCOUNTER
Spoke with pt's daughter and rescheduled pt's appts to 1030 on 12/6, 930 on 12/16, and 930 on 12/23

## 2024-12-03 NOTE — TELEPHONE ENCOUNTER
----- Message from Sebastian Montiel PA-C sent at 12/3/2024  6:41 AM CST -----  Aleida Bliss    The lab work results are within an acceptable range.

## 2024-12-05 ENCOUNTER — TELEPHONE (OUTPATIENT)
Dept: PRIMARY CARE CLINIC | Facility: CLINIC | Age: 74
End: 2024-12-05
Payer: MEDICARE

## 2024-12-07 ENCOUNTER — NURSE TRIAGE (OUTPATIENT)
Dept: ADMINISTRATIVE | Facility: CLINIC | Age: 74
End: 2024-12-07
Payer: MEDICARE

## 2024-12-07 PROBLEM — Z71.89 ACP (ADVANCE CARE PLANNING): Status: ACTIVE | Noted: 2024-12-07

## 2024-12-07 PROBLEM — I5A MYOCARDIAL INJURY: Status: ACTIVE | Noted: 2024-12-07

## 2024-12-07 PROBLEM — E83.39 HYPOPHOSPHATEMIA: Status: ACTIVE | Noted: 2024-12-07

## 2024-12-07 PROBLEM — E87.6 HYPOKALEMIA: Status: ACTIVE | Noted: 2024-12-07

## 2024-12-07 NOTE — TELEPHONE ENCOUNTER
Elena Stringer, Aleida's grand daughter reports pt with weakness, no appetite, no energy, can barely walk, can barely hold anything in hands, left hand is worse than the right, more forgetful than usual and slurred speech. Geeta reports symptoms are new for Aleida, are getting worse and have been present since pt received first iron infusion on 11/25/24. Last Iron infusion was on 12/2/24. Advised Mignrizeke per triage protocol to call  now. V/u.   Reason for Disposition   [1] SEVERE weakness (i.e., unable to walk or barely able to walk, requires support) AND [2] new-onset or getting worse    Additional Information   Negative: SEVERE difficulty breathing (e.g., struggling for each breath, speaks in single words)   Negative: Shock suspected (e.g., cold/pale/clammy skin, too weak to stand, low BP, rapid pulse)   Negative: Difficult to awaken or acting confused (e.g., disoriented, slurred speech)   Negative: [1] Fainted > 15 minutes ago AND [2] still feels too weak or dizzy to stand    Protocols used: Weakness (Generalized) and Fatigue-A-AH

## 2024-12-08 PROBLEM — E44.0 MODERATE MALNUTRITION: Status: ACTIVE | Noted: 2024-12-08

## 2024-12-10 PROBLEM — Z73.6 LIMITATION OF ACTIVITIES DUE TO DISABILITY: Status: ACTIVE | Noted: 2024-12-10

## 2024-12-12 ENCOUNTER — TELEPHONE (OUTPATIENT)
Dept: HEMATOLOGY/ONCOLOGY | Facility: CLINIC | Age: 74
End: 2024-12-12
Payer: MEDICARE

## 2024-12-12 ENCOUNTER — PATIENT MESSAGE (OUTPATIENT)
Dept: HEMATOLOGY/ONCOLOGY | Facility: CLINIC | Age: 74
End: 2024-12-12
Payer: MEDICARE

## 2024-12-12 ENCOUNTER — TELEPHONE (OUTPATIENT)
Dept: FAMILY MEDICINE | Facility: CLINIC | Age: 74
End: 2024-12-12
Payer: MEDICARE

## 2024-12-12 NOTE — TELEPHONE ENCOUNTER
Spoke to patient's daughter, She states her mother was just discharged from [facility] [place] for poor appetite and confusion workup showed hypercalcemia. Wondering if they need to continue the IV iron.            ----- Message from [employer] Kasey sent at 12/12/2024 12:20 PM CST -----  Type: Needs Medical Advice  Who Called:  young ( daughter)  Best Call Back Number: [phone]    Additional Information: young wants to know if she still needs her infusions she was released from hospital , and would like a call back to further discuss next steps thank you .

## 2024-12-12 NOTE — TELEPHONE ENCOUNTER
Spoke w/ pt dtr. Pt d/c'd from [facility] and needs f/u w/in 14d. Appt made for 830am on 12/19/24. She will contact pts Hem/Onc MD to find out if pt need to restart Fe infusions.

## 2024-12-12 NOTE — TELEPHONE ENCOUNTER
----- Message from Johnny sent at 12/11/2024  2:13 PM CST -----  Regarding: hospital f/u  Contact: patient  Type:  Patient Returning Call    Who Called:patient  Who Left Message for Patient:staff  Does the patient know what this is regarding?:hospital f/u appointment  Would the patient rather a call back or a response via [facility]? Call center unable to schedule  Best Call Back Number:[phone]  Additional Information:

## 2024-12-13 DIAGNOSIS — D50.9 IRON DEFICIENCY ANEMIA, UNSPECIFIED IRON DEFICIENCY ANEMIA TYPE: ICD-10-CM

## 2024-12-13 RX ORDER — FERROUS ASPARTO GLYCINATE, IRON, ASCORBIC ACID, FOLIC ACID, CYANOCOBALAMIN, ZINC, SUCCINIC ACID, AND INTRINSIC FACTOR 50; 100; 60; 750; 250; 25; 15; 50; 100 MG/1; MG/1; MG/1; UG/1; UG/1; UG/1; MG/1; MG/1; MG/1
1 TABLET ORAL DAILY
Qty: 30 TABLET | Refills: 11 | Status: SHIPPED | OUTPATIENT
Start: 2024-12-13

## 2024-12-13 NOTE — TELEPHONE ENCOUNTER
----- Message from Kristi sent at 12/13/2024 12:50 PM CST -----  Contact: [phone] Patient  PHARMACY CHANGE    PLEASE SEND TO [facility] NOT [facility]. Thank you          Requesting an RX refill or new RX.    Is this a refill or new RX: new    RX name and strength (copy/paste from chart):  iron ag,[id number] (NIFEREX, SUMALATE-QUATREFOLIC,) 150 mg iron- 60 mg-1 mg Tab    Is this a 30 day or 90 day RX: 90    Pharmacy name and phone # (copy/paste from chart):    [facility] - [place], [place] - [address]  [address] [address]  Phone: [phone] Fax: [phone]

## 2024-12-16 ENCOUNTER — INFUSION (OUTPATIENT)
Dept: INFUSION THERAPY | Facility: HOSPITAL | Age: 74
End: 2024-12-16
Attending: INTERNAL MEDICINE
Payer: MEDICARE

## 2024-12-16 VITALS
HEART RATE: 81 BPM | RESPIRATION RATE: 18 BRPM | DIASTOLIC BLOOD PRESSURE: 63 MMHG | SYSTOLIC BLOOD PRESSURE: 111 MMHG | TEMPERATURE: 99 F | OXYGEN SATURATION: 100 %

## 2024-12-16 DIAGNOSIS — D50.8 IRON DEFICIENCY ANEMIA SECONDARY TO INADEQUATE DIETARY IRON INTAKE: Primary | ICD-10-CM

## 2024-12-16 PROCEDURE — 25000003 PHARM REV CODE 250: Mod: HCNC,PN | Performed by: INTERNAL MEDICINE

## 2024-12-16 PROCEDURE — 96365 THER/PROPH/DIAG IV INF INIT: CPT | Mod: HCNC,PN

## 2024-12-16 PROCEDURE — 63600175 PHARM REV CODE 636 W HCPCS: Mod: HCNC,PN | Performed by: INTERNAL MEDICINE

## 2024-12-16 RX ORDER — DIPHENHYDRAMINE HYDROCHLORIDE 50 MG/ML
50 INJECTION INTRAMUSCULAR; INTRAVENOUS ONCE AS NEEDED
Status: DISCONTINUED | OUTPATIENT
Start: 2024-12-16 | End: 2024-12-16 | Stop reason: HOSPADM

## 2024-12-16 RX ORDER — HEPARIN 100 UNIT/ML
500 SYRINGE INTRAVENOUS
OUTPATIENT
Start: 2024-12-23

## 2024-12-16 RX ORDER — SODIUM CHLORIDE 0.9 % (FLUSH) 0.9 %
10 SYRINGE (ML) INJECTION
Status: DISCONTINUED | OUTPATIENT
Start: 2024-12-16 | End: 2024-12-16 | Stop reason: HOSPADM

## 2024-12-16 RX ORDER — DIPHENHYDRAMINE HYDROCHLORIDE 50 MG/ML
50 INJECTION INTRAMUSCULAR; INTRAVENOUS ONCE AS NEEDED
OUTPATIENT
Start: 2024-12-23

## 2024-12-16 RX ORDER — EPINEPHRINE 0.3 MG/.3ML
0.3 INJECTION SUBCUTANEOUS ONCE AS NEEDED
Status: DISCONTINUED | OUTPATIENT
Start: 2024-12-16 | End: 2024-12-16 | Stop reason: HOSPADM

## 2024-12-16 RX ORDER — EPINEPHRINE 0.3 MG/.3ML
0.3 INJECTION SUBCUTANEOUS ONCE AS NEEDED
OUTPATIENT
Start: 2024-12-23

## 2024-12-16 RX ORDER — SODIUM CHLORIDE 0.9 % (FLUSH) 0.9 %
10 SYRINGE (ML) INJECTION
OUTPATIENT
Start: 2024-12-23

## 2024-12-16 RX ADMIN — SODIUM CHLORIDE 125 MG: 9 INJECTION, SOLUTION INTRAVENOUS at 09:12

## 2024-12-16 NOTE — PLAN OF CARE
Problem: Adult Inpatient Plan of Care  Goal: Plan of Care Review  Outcome: Progressing  Goal: Patient-Specific Goal (Individualized)  Outcome: Progressing     Problem: Fatigue  Goal: Improved Activity Tolerance  Outcome: Progressing  Intervention: Promote Improved Energy  Flowsheets (Taken 12/16/2024 1100)  Fatigue Management: paced activity encouraged  Sleep/Rest Enhancement:   consistent schedule promoted   noise level reduced  Activity Management:   Ambulated -L4   Walk with assistive devise and /or staff member - L3   Up in chair - L3   Ambulated to bathroom - L4  Environmental Support:   calm environment promoted   environmental consistency promoted   distractions minimized   personal routine supported   Pt. tolerated ferrlecit infusion well. VS stable. No adverse reaction noted. 30 min observation period done. Discussed future appointments. NAD noted. Pt. discharged to home ambulatory with walker.

## 2024-12-17 ENCOUNTER — OUTPATIENT CASE MANAGEMENT (OUTPATIENT)
Dept: ADMINISTRATIVE | Facility: OTHER | Age: 74
End: 2024-12-17
Payer: MEDICARE

## 2024-12-17 NOTE — PROGRESS NOTES
12/17/2024  1st attempt to complete Initial Assessment  for Outpatient Care Management, left message.  Will send via portal -  unable to assess letter.

## 2024-12-17 NOTE — LETTER
Aleida Bliss  [address] [address] [address]    Dear Aleida Bliss,     Welcome to [facility] Outpatient Care Management Program. We are here to assist patients with multiple long-term (chronic) conditions who often need more personalized healthcare.    It was a pleasure talking with you today. My name is Bernadine Lagos RN. I look forward to working with you as your Care Manager. I will be contacting you by telephone routinely to help coordinate care and resolve issues.    My goal is to help you function at the healthiest and highest level possible. You can contact me directly at [phone].    As an [facility] patient with [employer], some of the services we provide, at no cost to you, include:     Development of an individualized care plan with a Registered Nurse   Connection with a   Assistance from a Community Health Worker  Connection with available resources and services    Coordinate communication among your care team members   Provide coaching and education  Help you understand your doctor's treatment plan  Help you obtain information about your insurance coverage.    All services provided by [facility]s Outpatient Care Managers and other care team members are coordinated with and communicated to your primary care team.      As part of your enrollment, you will be receiving education materials and more information about these services in your [facility] account, by phone, or through the mail. If you do not wish to participate or receive information, you can Opt Out by contacting our office at [phone].      Sincerely,        Bernadine Lagos RN  [facility]   Outpatient Care Management

## 2024-12-17 NOTE — LETTER
Aleida Bliss  [address] [address] [address]      Dear Aleida Bliss,     I work with [facility] Outpatient Care Management Department. We received a referral to call you to discuss your medical history. These services are free of charge and are offered to [facility] patients who have recently been discharged from any of our facilities or who have medical conditions that may require the skill of a nurse to assist with management.     I am a Registered Nurse who specializes in connecting patients with available medical and financial resources as well as addressing any educational needs that may be indicated.    I attempted to reach you by telephone, but I was unsuccessful. Please call our department so that we can go over some questions with you, regarding your health.    The Outpatient Care Management Department can be reached at [phone], from 8:00AM to 4:30 PM, on Monday thru Friday.     Additionally, [facility] also has a program where a nurse is available 24/7 to answer questions or provide medical advice, their number is [phone].      Thanks,        Bernadine Lagos RN  Outpatient Care Management  Phone #: [phone]

## 2024-12-18 ENCOUNTER — PATIENT MESSAGE (OUTPATIENT)
Dept: ADMINISTRATIVE | Facility: OTHER | Age: 74
End: 2024-12-18
Payer: MEDICARE

## 2024-12-18 NOTE — PROGRESS NOTES
Outpatient Care Management  Initial Patient Assessment    Patient: Aleida Bliss  MRN: [id number]  Date of Service: 12/17/2024  Completed by: Bernadine Lagos RN  Referral Date: 12/11/2024  Date of Eligibility: 12/12/2024  Program:   High Risk  Status: Ongoing  Effective Dates: 12/17/2024 - present  Responsible Staff: Bernadine Lagos, RN        Reason for Visit   Patient presents with    OPCM Enrollment Call       Brief Summary:  Aleida Bliss was referred by Dr. Tinsley for hypercalcemia. Patient qualifies for program based on high risk score 71%.   Active problem list, medical, surgical and social history reviewed. Active comorbidities include pulmonary HTN, HTN, CAD, mitral valve repair, osteoporosis, hyperparathyroidism with recurrent hypercalcemia, DOMINIC on CPAP, arthritis, GERD, TIA 2021, MI, JOE. Areas of need identified by patient include weakness and appetite.   Next steps:   Mrs. Bliss and Joshua agreed to OPCM RN follow up on or around 12/26/24.  Follow up for questions to hyperparathyroidism edu on next call.  Review falls precautions due to weakness.  Follow up constipation resolution.  Call re: [employer].  Follow up re: OPCM SW referral for community resources.  Review daily oral intake.  Follow up for any questions re: edu of hypercalcemia.  Call [employer].  PATIENT SELF MANAGEMENT PLAN:  Joshua agreed to review edu sent on hyperparathyroidism with Mrs. Bliss prior to next follow up call.  Joshua agreed to review edu on hypercalcemia with Mrs. Bliss prior to next follow up call.    Disability Status  Is the patient alert and oriented (person, place, time, and situation)?: Alert and oriented x 4  Hearing Difficulty or Deaf: no  Visual Difficulty or Blind: yes  Visual and Hearing Needs Conclusion: No hearing concerns; wears OTC readers; needs annual eye exam; would like to use a vision center close to them, specifically noted in [place].  Difficulty Concentrating,  Remembering or Making Decisions: yes  Communication Difficulty: no  Eating/Swallowing Difficulty: no  Walking or Climbing Stairs Difficulty: yes  Walking or Climbing Stairs: ambulation difficulty, requires equipment  Mobility Management: RW,Rollator,St.Cane,BP machine,Scale  Dressing/Bathing Difficulty: yes  Dressing/Bathing: bathing difficulty, assistance 1 person; bathing difficulty, requires equipment  Dressing/Bathing Management: BSC,SC  Toileting : Assistance Required  Continence : Continence - Not a problem  Difficulty Managing Errands Independently: yes  Equipment Currently Used at Home: blood pressure machine; shower chair; cane, straight; CPAP; rollator; walker, rolling; scale  ADL Conclusion Statement: Uses a rollator at all times; needs assistance on occasion with bathing and dressing; feeds self.  Change in Functional Status Since Onset of Current Illness/Injury: yes        Spiritual Beliefs  Spiritual, Cultural Beliefs, Congregation Practices, Values that Affect Care: no      Social History     Socioeconomic History    Marital status:    Tobacco Use    Smoking status: Never     Passive exposure: Never    Smokeless tobacco: Never   Substance and Sexual Activity    Alcohol use: No    Drug use: No    Sexual activity: Not Currently     Social Drivers of Health     Financial Resource Strain: Low Risk  (12/9/2024)    Overall Financial Resource Strain (CARDIA)     Difficulty of Paying Living Expenses: Not hard at all   Food Insecurity: No Food Insecurity (12/9/2024)    Hunger Vital Sign     Worried About Running Out of Food in the Last Year: Never true     Ran Out of Food in the Last Year: Never true   Transportation Needs: No Transportation Needs (12/12/2024)    OASIS : Transportation     Lack of Transportation (Medical): No     Lack of Transportation (Non-Medical): No     Patient Unable or Declines to Respond: No   Stress: Patient Declined (12/7/2024)    [employer] -  Occupational Stress Questionnaire     Feeling of Stress : Patient declined   Housing Stability: Low Risk  (12/9/2024)    Housing Stability Vital Sign     Unable to Pay for Housing in the Last Year: No     Homeless in the Last Year: No       Roles and Relationships  Primary Source of Support/Comfort: child(jojo); sibling(s)  Name of Support/Comfort Primary Source: Joshua Stringer  Secondary Source of Support/Comfort: sibling(s)  Name of Support/Comfort Secondary Source: sister Reinoso      Advance Directives (For Healthcare)  Advance Directive  (If Adv Dir status is received, view document under Adv Dir in header or Chart Review Media tab): Patient does not have Advance Directive, declines information.  Patient Requests Assistance: Patient will do independently        Patient Reported Insurance  Verified current insurance plan:: [employer] Advantage; Medicaid  [employer] benefits discussed:: OTC Prescription Discounts; [employer]; Transportation; Silver Sneakers; Mail Order Pharmacy            12/17/2024     5:02 PM 11/6/2024     8:42 AM 10/4/2024    10:26 AM 8/8/2024     1:11 PM 5/28/2024    10:33 AM 4/27/2023    11:18 AM 1/6/2022    10:46 AM   Depression Patient Health Questionnaire   Over the last two weeks how often have you been bothered by little interest or pleasure in doing things Several days Not at all Not at all Not at all Not at all Not at all Not at all   Over the last two weeks how often have you been bothered by feeling down, depressed or hopeless Several days Not at all Not at all Not at all Not at all Not at all Not at all   PHQ-2 Total Score 2 0 0 0 0 0 0       Learning Assessment       12/16/2024 1058 [facility] - Infusion (12/16/2024 - Present)   Created by Lien Pascal, RN - RN (Nurse) Status: Complete                 PRIMARY LEARNER     Primary Learner Name:  zion dhillon CD - 12/16/2024 1058    Relationship:  Patient CD - 12/16/2024 1058    Does the primary learner have  any barriers to learning?:  No Barriers CD - 12/16/2024 1058    What is the preferred language of the primary learner?:  English CD - 12/16/2024 1058    Is an  required?:  No CD - 12/16/2024 1058    How does the primary learner prefer to learn new concepts?:  Listening, Reading, Demonstration, Pictures/Video CD - 12/16/2024 1058    How often do you need to have someone help you read instructions, pamphlets, or written material from your doctor or pharmacy?:  Sometimes CD - 12/16/2024 1058        CO-LEARNER #1     Is an  required?:  No CD - 12/16/2024 1058        CO-LEARNER #2     Is an  required?:  No  - 12/16/2024 1058        SPECIAL TOPICS     No question answered        ANSWERED BY:     No question answered        Comments         Edit History       Lien Pascal, RN - RN (Nurse)   12/16/2024 1058

## 2024-12-19 ENCOUNTER — TELEPHONE (OUTPATIENT)
Dept: FAMILY MEDICINE | Facility: CLINIC | Age: 74
End: 2024-12-19
Payer: MEDICARE

## 2024-12-19 ENCOUNTER — OFFICE VISIT (OUTPATIENT)
Dept: PRIMARY CARE CLINIC | Facility: CLINIC | Age: 74
End: 2024-12-19
Payer: MEDICARE

## 2024-12-19 VITALS
WEIGHT: 144.63 LBS | SYSTOLIC BLOOD PRESSURE: 118 MMHG | HEART RATE: 82 BPM | BODY MASS INDEX: 23.34 KG/M2 | DIASTOLIC BLOOD PRESSURE: 68 MMHG

## 2024-12-19 DIAGNOSIS — I27.20 SEVERE PULMONARY HYPERTENSION: ICD-10-CM

## 2024-12-19 DIAGNOSIS — L02.619 CELLULITIS AND ABSCESS OF FOOT: Primary | ICD-10-CM

## 2024-12-19 DIAGNOSIS — E21.3 HYPERPARATHYROIDISM: ICD-10-CM

## 2024-12-19 DIAGNOSIS — L03.119 CELLULITIS AND ABSCESS OF FOOT: Primary | ICD-10-CM

## 2024-12-19 DIAGNOSIS — Z23 NEED FOR VACCINATION: ICD-10-CM

## 2024-12-19 RX ORDER — CEPHALEXIN 500 MG/1
500 CAPSULE ORAL EVERY 8 HOURS
Qty: 21 CAPSULE | Refills: 0 | Status: SHIPPED | OUTPATIENT
Start: 2024-12-19

## 2024-12-19 NOTE — PROGRESS NOTES
Subjective     Patient ID: Aleida Bliss is a 74 y.o. female.    Chief Complaint: No chief complaint on file.    Patient is a 73 yo female who comes in today for a post hospital visit.     HPI:    74-year-old female with a PMH of pulmonary HTN, HTN, CAD,  mitral valve repair, osteoporosis, hyperparathyroidism with recurrent hypercalcemia, and DOMINIC on CPAP presents to [facility] ED accompanied by  and daughter at bedside complaining of weakness and fatigue.       Daughter at bedside explains patient has been declining with the past week with increased forgetfulness and confusion.  Patient has had poor p.o. intake and appetite with roughly 15-20 lb weight loss over the past 1 month.  Patient was recently started on Megace without improvement.  Patient also endorses severe constipation without bowel movement reportedly in the last 2 weeks.  Patient has also become significantly weak having difficulty ambulating. Patient also endorses intermittent episodes of night sweats, polydipsia and polyuria.  Patient has been recently initiated on IV iron transfusions and patient and family unclear by iron infusions ordered. Review of records patient has had issues with hypercalcemia since 2016 and workup previously indicated secondary to hyperparathyroidism.  Patient currently has no other complaints at this time.   Patient denies fever, chest pain, shortness for breath, abdominal pain, nausea, vomiting, palpitations, lower extremity swelling, dysuria, or any other symptoms at this time.       In the ED: Afebrile and vital signs stable saturating appropriately on room air.  CBC grossly unremarkable; hypokalemia 3.3; hypomagnesemia 1.4; hypophosphatemia 2.0; hyper calcemia 13.9 and albumin 3.0 with corrected calcium 14.7; ; troponin elevated 567 and BNP elevated 5563; EKG with multiple abnormalities unchanged from prior; patient not currently complain of chest pain; CT head and chest x-ray negative for acute abnormality;  UA and flu/COVID negative. Patient was given magnesium 2 g IV, potassium bicarb, 1 L bolus normal saline and Lasix 20 mg IV x1.  [facility] consulted for further evaluation and admission of  symptomatic hypercalcemia.     * No surgery found *       Hospital Course:   75 yo with hx of pulmonary HTN, HTN, CAD, hyperparathyroidism with recurrent hypercalcemia presented with increasing weakness, confusion, weight loss, and fatigue. Patient also endorsing constipation, night sweats, polydipsia, and polyuria. Our team was consulted for management of symptomatic hypercalcemia. Treated with IVF and lasix. Calcium responding. Repeat CMP pending for this afternoon. Phosphorus repletion per protocol. Troponins elevated and then trended down.Cardiology consulted and suggested Type 2 MI as reason for troponin increase. Endocrinology consulted; see notes below. Patient continues to improve. PT/OT suggests home health at discharge.     Endocrinology recommendations:  - calcitonin 200 units SQ Q12hr x4 doses  - Aredia 60mg IV over 6 hours     Disposition:   Sensipar 30mg PO QAM. Follow up with Dr. Garcia in 5 weeks. Home health to draw labs in 10 days to reassess Ca, K, Mg, and phos.        Goals of Care Treatment Preferences:  Code Status: Full Code           Consults:   Consults (From admission, onward)           Status Ordering Provider        Inpatient consult to Endocrinology  Once       Provider:  Edwar Garcia MD   Acknowledged RACHEL DAHL        Inpatient consult to Cardiology  Once       Provider:  Jose Rios MD   Completed RACHEL DAHL        Inpatient consult to Registered Dietitian/Nutritionist  Once       Provider:  (Not yet assigned)   SVETLANA Cotto              * Hypercalcemia  Hypercalcemia is likely due to Hyperparathyroidism. The patient has the following symptoms due to their hypercalcemia: constipation, polydypsia and/or polyuria, weakness, and encephalopathy. Their most recent  calcium and albumin results are listed below.  Recent Labs    12/09/24  0432 12/10/24  0735 12/11/24  0640  CALCIUM 11.1* 9.6 9.2  ALBUMIN 2.4* 2.5* 2.4*       Chart review patient was evaluated for hypercalcemia and found to have secondary to hyperparathyroidism.  Patient explains there was no follow up for parathyroidectomy plans. Endocrinology consulted.      Endocrinology recommendations:  - calcitonin 200 units SQ Q12hr x4 doses- completed  - Aredia 60mg IV over 6 hours- completed  DISCHARGE ON sensipar 30mg PO QAM        Hypophosphatemia  Patient's most recent phosphorus results are listed below.   Recent Labs    12/09/24  0432 12/10/24  0735 12/11/24  0640  PHOS 2.3* 2.1* 2.5*        Treated hypophosphatemia with Potasisum- Na- Phos oral due to IV medications shortages   Discharge with Sensipar to treat hypophosphatemia     Hypokalemia  Patient's most recent potassium results are listed below.   Recent Labs    12/09/24  0432 12/10/24  0735 12/11/24  0640  K 3.1* 4.2 4.6        RESOLVED     Hypomagnesemia  RESOLVED     Myocardial injury  Initial troponin elevated.   Trended until peak  Patient currently asymptomatic  Cardiology consulted: type two MI. No further workup needed.        Primary hypertension  Current Antihypertensives  atenoloL tablet 50 mg, Daily, Oral  furosemide tablet 40 mg, Daily, Oral  Continue at DC     Severe pulmonary hypertension  Continued Revatio at discharge     DOMINIC on CPAP  Continued home CPAP        ACP (advance care planning)   Discussed code status  with patient and  and daughter including chest compressions and intubation with mechanical ventilation  Patient  and family clearly verbalize desire for full code at this time        Moderate malnutrition  Nutrition consulted. Most recent weight and BMI monitored-      Measurements:  Wt Readings from Last 1 Encounters:  12/08/24 65.5 kg (144 lb 6.4 oz)  Body mass index is 23.31 kg/m².     Patient has been screened and  assessed by RD.     Malnutrition Type:  Context: acute illness or injury  Level: moderate     Malnutrition Characteristic Summary:  Weight Loss (Malnutrition): 5% in 1 month (5.6% in 1 month)  Energy Intake (Malnutrition): less than 75% for greater than 7 days     Interventions/Recommendations (treatment strategy):  1.) When medically able advance to a regular diet order 2.) Ensure HP PRN        Limitation of activities due to disability  PT recommends home health physical therapy           Final Active Diagnoses:    Diagnosis Date Noted POA  · PRINCIPAL PROBLEM:  Hypercalcemia [E83.52] 05/09/2023 Yes  · Hypophosphatemia [E83.39] 12/07/2024 Yes  · Hypokalemia [E87.6] 12/07/2024 Yes  · Hypomagnesemia [E83.42] 04/12/2023 Yes  · Myocardial injury [I5A] 12/07/2024 Yes  · Primary hypertension [I10] 01/14/2022 Yes  · Severe pulmonary hypertension [I27.20] 01/14/2022 Yes  · DOMINIC on CPAP [G47.33] 10/25/2023 Yes  · ACP (advance care planning) [Z71.89] 12/07/2024 Not Applicable  · Moderate malnutrition [E44.0] 12/08/2024 Yes  · Limitation of activities due to disability [Z73.6] 12/10/2024 Yes     Problems Resolved During this Admission:        Discharged Condition: stable     Disposition:      Follow Up:      Follow-up Information            Sebastian Montiel III PARamyaC. Schedule an appointment as soon as possible for a visit in 1 week(s).   Specialty: Internal Medicine  Why: hospital follow up apponitment as needed or as indicated at discharge.  Contact information:  [facility] [address]  [phone]                        [facility] Follow up.   Specialty: Transitional Care  Why: Nurse will contact the patient to schedule home visit with [facility] NP. Please answer a call from, [phone].  Contact information:  [address] [place] [id number] [phone]                     Edwar Garcia MD. Schedule an appointment as soon as possible for a visit  in 5 week(s).   Specialty: Endocrinology  Contact information:  [address] [place] [address]  [phone]      Patient Active Problem List:     Mitral valve disorder     Chest pain with high risk of acute coronary syndrome     Bradycardia     Severe pulmonary hypertension     Severe mitral regurgitation     Elevated left ventricular end-diastolic pressure (LVEDP)     H/O: rheumatic fever     Overweight (BMI 25.0-29.9)     Primary hypertension     Hypercholesterolemia     Nonspecific abnormal electrocardiogram (ECG) (EKG)     Imbalance     Cervicalgia     Abnormal nuclear stress test     Moderate to severe mitral regurgitation     Coronary artery disease of native artery of native heart with stable angina pectoris     Gastroesophageal reflux disease     Vitamin D deficiency disease     Seasonal allergies     Mitral regurgitation and aortic stenosis     Pulmonary hypertension     Carotid artery plaque, left     Hypomagnesemia     Mild carotid artery disease     Multiple thyroid nodules     Hypercalcemia     Age-related osteoporosis without current pathological fracture     Encounter for pre-operative cardiovascular clearance     PVC (premature ventricular contraction)     DOMINIC on CPAP     Hyperparathyroidism     SOB (shortness of breath)     Hx of mitral valve replacement     Hx of mitral valve replacement with tissue graft     Long term (current) use of anticoagulants     Thrombocytopenia, unspecified     Acute deep vein thrombosis (DVT) of axillary vein of left upper extremity     Iron deficiency anemia     Valvular heart disease     Myocardial injury     Hypokalemia     Hypophosphatemia     ACP (advance care planning)     Moderate malnutrition     Limitation of activities due to disability    Has been receiving home health. She reports a red area to the top of her foot.       Review of Systems   Constitutional:  Positive for fatigue. Negative for chills and fever.   Respiratory:  Negative for chest tightness  and shortness of breath.    Cardiovascular:  Negative for chest pain.   Gastrointestinal:  Negative for abdominal distention.          Objective     Physical Exam  Constitutional:       General: She is not in acute distress.     Appearance: Normal appearance. She is not ill-appearing, toxic-appearing or diaphoretic.   Neck:      Vascular: No carotid bruit.   Cardiovascular:      Rate and Rhythm: Normal rate and regular rhythm.      Pulses: Normal pulses.      Heart sounds: Normal heart sounds. No murmur heard.     No friction rub. No gallop.   Pulmonary:      Effort: Pulmonary effort is normal. No respiratory distress.      Breath sounds: Normal breath sounds. No stridor. No wheezing, rhonchi or rales.   Chest:      Chest wall: No tenderness.   Abdominal:      Palpations: Abdomen is soft.      Tenderness: There is no abdominal tenderness.   Musculoskeletal:      Cervical back: No rigidity or tenderness.        Feet:    Feet:      Comments: Erythema with slight edema  Lymphadenopathy:      Cervical: No cervical adenopathy.   Neurological:      Mental Status: She is alert.            Assessment and Plan     1. Cellulitis and abscess of foot      cephALEXin (KEFLEX) 500 MG capsule; Take 1 capsule (500 mg total) by mouth every 8 (eight) hours.  Dispense: 21 capsule; Refill: 0      2. Hyperparathyroidism  Was set up to see endocrinology in [facility], but patient declines driving that far    -     Ambulatory referral/consult to Endocrinology; Future; Expected date: 12/26/2024    3. Severe pulmonary hypertension  The current medical regimen is effective;  continue present plan and medications.   4. Need for vaccination  -     influenza (adjuvanted) (Fluad) 45 mcg/0.5 mL IM vaccine (> or = 66 yo) 0.5 mL    Other orders  -   I spent 45 minutes on this encounter, time includes face-to-face, chart review, documentation, test review and orders.           Follow up in about 4 weeks (around 1/16/2025).

## 2024-12-23 ENCOUNTER — HOSPITAL ENCOUNTER (OUTPATIENT)
Dept: RADIOLOGY | Facility: HOSPITAL | Age: 74
Discharge: HOME | End: 2024-12-23
Attending: PHYSICIAN ASSISTANT
Payer: MEDICARE

## 2024-12-23 ENCOUNTER — INFUSION (OUTPATIENT)
Dept: INFUSION THERAPY | Facility: HOSPITAL | Age: 74
End: 2024-12-23
Attending: INTERNAL MEDICINE
Payer: MEDICARE

## 2024-12-23 ENCOUNTER — TELEPHONE (OUTPATIENT)
Dept: PRIMARY CARE CLINIC | Facility: CLINIC | Age: 74
End: 2024-12-23
Payer: MEDICARE

## 2024-12-23 VITALS
DIASTOLIC BLOOD PRESSURE: 74 MMHG | OXYGEN SATURATION: 98 % | TEMPERATURE: 98 F | RESPIRATION RATE: 18 BRPM | HEART RATE: 80 BPM | SYSTOLIC BLOOD PRESSURE: 127 MMHG

## 2024-12-23 DIAGNOSIS — M25.512 ACUTE PAIN OF LEFT SHOULDER: ICD-10-CM

## 2024-12-23 DIAGNOSIS — D50.8 IRON DEFICIENCY ANEMIA SECONDARY TO INADEQUATE DIETARY IRON INTAKE: Primary | ICD-10-CM

## 2024-12-23 PROCEDURE — 73030 X-RAY EXAM OF SHOULDER: CPT | Mod: TC,HCNC,FY,PO,LT

## 2024-12-23 PROCEDURE — 25000003 PHARM REV CODE 250: Mod: HCNC,PN | Performed by: INTERNAL MEDICINE

## 2024-12-23 PROCEDURE — 63600175 PHARM REV CODE 636 W HCPCS: Mod: HCNC,PN | Performed by: INTERNAL MEDICINE

## 2024-12-23 PROCEDURE — 96365 THER/PROPH/DIAG IV INF INIT: CPT | Mod: HCNC,PN

## 2024-12-23 PROCEDURE — 71100 X-RAY EXAM RIBS UNI 2 VIEWS: CPT | Mod: 26,HCNC,LT, | Performed by: STUDENT IN AN ORGANIZED HEALTH CARE EDUCATION/TRAINING PROGRAM

## 2024-12-23 PROCEDURE — 71100 X-RAY EXAM RIBS UNI 2 VIEWS: CPT | Mod: TC,HCNC,FY,PO,LT

## 2024-12-23 PROCEDURE — 73030 X-RAY EXAM OF SHOULDER: CPT | Mod: 26,HCNC,LT, | Performed by: RADIOLOGY

## 2024-12-23 RX ORDER — DOXYCYCLINE 100 MG/1
100 CAPSULE ORAL EVERY 12 HOURS
Qty: 14 CAPSULE | Refills: 0 | Status: SHIPPED | OUTPATIENT
Start: 2024-12-23 | End: 2024-12-30

## 2024-12-23 RX ORDER — SODIUM CHLORIDE 0.9 % (FLUSH) 0.9 %
10 SYRINGE (ML) INJECTION
OUTPATIENT
Start: 2024-12-30

## 2024-12-23 RX ORDER — SODIUM CHLORIDE 0.9 % (FLUSH) 0.9 %
10 SYRINGE (ML) INJECTION
Status: DISCONTINUED | OUTPATIENT
Start: 2024-12-23 | End: 2024-12-23 | Stop reason: HOSPADM

## 2024-12-23 RX ORDER — HEPARIN 100 UNIT/ML
500 SYRINGE INTRAVENOUS
OUTPATIENT
Start: 2024-12-30

## 2024-12-23 RX ORDER — EPINEPHRINE 0.3 MG/.3ML
0.3 INJECTION SUBCUTANEOUS ONCE AS NEEDED
OUTPATIENT
Start: 2024-12-30

## 2024-12-23 RX ORDER — DIPHENHYDRAMINE HYDROCHLORIDE 50 MG/ML
50 INJECTION INTRAMUSCULAR; INTRAVENOUS ONCE AS NEEDED
OUTPATIENT
Start: 2024-12-30

## 2024-12-23 RX ADMIN — SODIUM CHLORIDE 125 MG: 9 INJECTION, SOLUTION INTRAVENOUS at 10:12

## 2024-12-23 NOTE — TELEPHONE ENCOUNTER
----- Message from Opal sent at 12/23/2024  2:30 PM CST -----  Regarding: Needs return call  Type: Needs Medical Advice  Who Called:  Pt Daughter     Best Call Back Number: [phone]    Additional Information: Pt was put on new medication cechalxine she started taking it and its giving her nausea and hallucinations, they would like to talk to the office to try and get something else called in pleas advise

## 2024-12-23 NOTE — TELEPHONE ENCOUNTER
Pt dtr is aware that pt need to stop taking keflex and that a new prescription was sent to pharmacy. Understanding voiced

## 2024-12-23 NOTE — PLAN OF CARE
Problem: Adult Inpatient Plan of Care  Goal: Plan of Care Review  Outcome: Progressing  Flowsheets (Taken 12/23/2024 0955)  Plan of Care Reviewed With: patient  Goal: Patient-Specific Goal (Individualized)  Outcome: Progressing  Flowsheets (Taken 12/23/2024 0955)  Individualized Care Needs: recliner, blanket, pillow, dimmed lights  Anxieties, Fears or Concerns: started in abx and had reaction, pt going to reach out to prescribing MD     Problem: Fatigue  Goal: Improved Activity Tolerance  Outcome: Progressing  Intervention: Promote Improved Energy  Flowsheets (Taken 12/23/2024 0955)  Fatigue Management: frequent rest breaks encouraged  Sleep/Rest Enhancement:   noise level reduced   relaxation techniques promoted   natural light exposure provided  Activity Management:   Walk with assistive devise and /or staff member - L3   Up in chair - L3  Environmental Support: calm environment promoted     Pt toleratesd iron infusion, completed 30 mins post obs period. VSS, NAD noted. Reviewed next appt, pt d/c home.

## 2024-12-23 NOTE — TELEPHONE ENCOUNTER
Pt was put on new medication keflex. Her dtr said that she started taking it and its giving her nausea and hallucinations and is requesting something different    Please advise

## 2024-12-27 ENCOUNTER — TELEPHONE (OUTPATIENT)
Dept: PRIMARY CARE CLINIC | Facility: CLINIC | Age: 74
End: 2024-12-27
Payer: MEDICARE

## 2024-12-27 NOTE — TELEPHONE ENCOUNTER
----- Message from Sebastian Montiel PA-C sent at 12/23/2024  1:31 PM CST -----  Aleida Bliss    The x-rays fail to show any fracture

## 2024-12-30 ENCOUNTER — OUTPATIENT CASE MANAGEMENT (OUTPATIENT)
Dept: ADMINISTRATIVE | Facility: OTHER | Age: 74
End: 2024-12-30
Payer: MEDICARE

## 2024-12-30 ENCOUNTER — INFUSION (OUTPATIENT)
Dept: INFUSION THERAPY | Facility: HOSPITAL | Age: 74
End: 2024-12-30
Attending: INTERNAL MEDICINE
Payer: MEDICARE

## 2024-12-30 VITALS
RESPIRATION RATE: 16 BRPM | TEMPERATURE: 98 F | HEART RATE: 78 BPM | OXYGEN SATURATION: 98 % | SYSTOLIC BLOOD PRESSURE: 138 MMHG | DIASTOLIC BLOOD PRESSURE: 80 MMHG

## 2024-12-30 DIAGNOSIS — D50.8 IRON DEFICIENCY ANEMIA SECONDARY TO INADEQUATE DIETARY IRON INTAKE: Primary | ICD-10-CM

## 2024-12-30 PROCEDURE — 63600175 PHARM REV CODE 636 W HCPCS: Mod: HCNC,PN | Performed by: INTERNAL MEDICINE

## 2024-12-30 PROCEDURE — 96365 THER/PROPH/DIAG IV INF INIT: CPT | Mod: HCNC,PN

## 2024-12-30 PROCEDURE — A4216 STERILE WATER/SALINE, 10 ML: HCPCS | Mod: HCNC,PN | Performed by: INTERNAL MEDICINE

## 2024-12-30 PROCEDURE — 25000003 PHARM REV CODE 250: Mod: HCNC,PN | Performed by: INTERNAL MEDICINE

## 2024-12-30 RX ORDER — HEPARIN 100 UNIT/ML
500 SYRINGE INTRAVENOUS
OUTPATIENT
Start: 2025-01-06

## 2024-12-30 RX ORDER — SODIUM CHLORIDE 0.9 % (FLUSH) 0.9 %
10 SYRINGE (ML) INJECTION
Status: DISCONTINUED | OUTPATIENT
Start: 2024-12-30 | End: 2024-12-30 | Stop reason: HOSPADM

## 2024-12-30 RX ORDER — EPINEPHRINE 0.3 MG/.3ML
0.3 INJECTION SUBCUTANEOUS ONCE AS NEEDED
OUTPATIENT
Start: 2025-01-06

## 2024-12-30 RX ORDER — DIPHENHYDRAMINE HYDROCHLORIDE 50 MG/ML
50 INJECTION INTRAMUSCULAR; INTRAVENOUS ONCE AS NEEDED
OUTPATIENT
Start: 2025-01-06

## 2024-12-30 RX ORDER — SODIUM CHLORIDE 0.9 % (FLUSH) 0.9 %
10 SYRINGE (ML) INJECTION
OUTPATIENT
Start: 2025-01-06

## 2024-12-30 RX ADMIN — SODIUM CHLORIDE, PRESERVATIVE FREE 10 ML: 5 INJECTION INTRAVENOUS at 10:12

## 2024-12-30 RX ADMIN — SODIUM CHLORIDE 125 MG: 9 INJECTION, SOLUTION INTRAVENOUS at 10:12

## 2024-12-30 NOTE — PLAN OF CARE
Problem: Fatigue  Goal: Improved Activity Tolerance  Outcome: Progressing  Intervention: Promote Improved Energy  Flowsheets (Taken 12/30/2024 1225)  Fatigue Management:   activity schedule adjusted   fatigue-related activity identified   frequent rest breaks encouraged     Problem: Fatigue  Goal: Improved Activity Tolerance  Intervention: Promote Improved Energy  Flowsheets (Taken 12/30/2024 1225)  Fatigue Management:   activity schedule adjusted   fatigue-related activity identified   frequent rest breaks encouraged     Problem: Adult Inpatient Plan of Care  Goal: Plan of Care Review  Outcome: Met   Tolerated infusion well today Able to be d/c to home  Discharge instructions given with copy of avs and pt  to private PER W/C vehicle without difficulty NAD

## 2024-12-30 NOTE — PROGRESS NOTES
Outpatient Care Management   - Patient Assessment    Patient: Aleida Bliss  MRN:  [id number]  Date of Service:  12/30/2024  Completed by:  Deanna Arzate LCSW  Referral Date: 12/11/2024    Reason for Visit   Patient presents with    Social Work Assessment       Brief Summary:  received a referral from OPCM RN for the following HR SW psychosocial needs utility assistance and transportation resources. Care plan was created in collaboration with patient/caregiver input.   completed the SDOH questionnaire.

## 2025-01-03 ENCOUNTER — OUTPATIENT CASE MANAGEMENT (OUTPATIENT)
Dept: ADMINISTRATIVE | Facility: OTHER | Age: 75
End: 2025-01-03
Payer: MEDICARE

## 2025-01-03 NOTE — PROGRESS NOTES
Outpatient Care Management  Plan of Care Follow Up Visit    Patient: Aleida Bliss  MRN: [id number]  Date of Service: 01/03/2025  Completed by: Bernadine Lagos RN  Referral Date: 12/11/2024    Reason for Visit   Patient presents with    OPCM RN Follow Up Call    Nursing Assessment       Brief Summary: OPCM RN followed up with Joshua today for care plan review on Mrs. Shin.    Next Steps:   Joshua agreed to OPCM RN follow up on Mrs. Shin on or around 1/20/25.  Re-explore ACP.  PATIENT SELF MANAGEMENT PLAN:  Joshua agreed to continue using daily falls precautions with Mrs. Shin.

## 2025-01-10 DIAGNOSIS — E83.42 HYPOMAGNESEMIA: ICD-10-CM

## 2025-01-10 DIAGNOSIS — R63.4 WEIGHT LOSS: ICD-10-CM

## 2025-01-10 DIAGNOSIS — E21.3 HYPERPARATHYROIDISM: Primary | ICD-10-CM

## 2025-01-13 ENCOUNTER — OUTPATIENT CASE MANAGEMENT (OUTPATIENT)
Dept: ADMINISTRATIVE | Facility: OTHER | Age: 75
End: 2025-01-13
Payer: MEDICARE

## 2025-01-14 DIAGNOSIS — Z00.00 ENCOUNTER FOR MEDICARE ANNUAL WELLNESS EXAM: ICD-10-CM

## 2025-01-23 ENCOUNTER — OUTPATIENT CASE MANAGEMENT (OUTPATIENT)
Dept: ADMINISTRATIVE | Facility: OTHER | Age: 75
End: 2025-01-23
Payer: MEDICARE

## 2025-01-23 NOTE — PROGRESS NOTES
Outpatient Care Management  Plan of Care Follow Up Visit    Patient: Aleida Bliss  MRN: [id number]  Date of Service: 01/23/2025  Completed by: Bernadine Lagos RN  Referral Date: 12/11/2024    Reason for Visit   Patient presents with    OPCM RN Follow Up Call       Brief Summary: Mrs. Bliss was hospitalized on 1/13/25 at [facility] in [facility]; Plans are to follow up with Joshua next week for more definitive discharge planning.

## 2025-02-03 ENCOUNTER — OUTPATIENT CASE MANAGEMENT (OUTPATIENT)
Dept: ADMINISTRATIVE | Facility: OTHER | Age: 75
End: 2025-02-03
Payer: MEDICARE

## 2025-02-03 NOTE — PROGRESS NOTES
Outpatient Care Management  Plan of Care Follow Up Visit    Patient: Aleida Bliss  MRN: [id number]  Date of Service: 02/03/2025  Completed by: Bernadine Lagos RN  Referral Date: 12/11/2024    Reason for Visit   Patient presents with    OPCM RN Follow Up Call    Case Closure       Brief Summary: 2/3/2025 - Followed up with Joshua today re: Mrs. Bliss and she voiced she is still [place] [facility] [facility] for discharge [facility].  OPCM RN will close case at this time; Joshua was instructed to reach out to OPCM RN once Mrs. Bliss returns home and will then re-enroll her.  She verbalized an understanding.

## 2025-03-03 NOTE — TELEPHONE ENCOUNTER
Normal results please call patient.  Spoke with pt and let her know that Dr. Mcdowell said to stop the coumadin and begin the eliquis. She verbalized understanding and phone call ended.

## 2025-03-24 RX ORDER — MONTELUKAST SODIUM 10 MG/1
10 TABLET ORAL NIGHTLY
Qty: 90 TABLET | Refills: 3 | Status: SHIPPED | OUTPATIENT
Start: 2025-03-24

## 2025-03-31 ENCOUNTER — TELEPHONE (OUTPATIENT)
Dept: PRIMARY CARE CLINIC | Facility: CLINIC | Age: 75
End: 2025-03-31
Payer: MEDICARE

## 2025-03-31 ENCOUNTER — TELEPHONE (OUTPATIENT)
Dept: FAMILY MEDICINE | Facility: CLINIC | Age: 75
End: 2025-03-31
Payer: MEDICARE

## 2025-03-31 DIAGNOSIS — E83.52 HYPERCALCEMIA: ICD-10-CM

## 2025-03-31 DIAGNOSIS — I10 PRIMARY HYPERTENSION: Primary | ICD-10-CM

## 2025-03-31 DIAGNOSIS — K21.9 GASTROESOPHAGEAL REFLUX DISEASE, UNSPECIFIED WHETHER ESOPHAGITIS PRESENT: ICD-10-CM

## 2025-03-31 RX ORDER — PANTOPRAZOLE SODIUM 40 MG/1
40 TABLET, DELAYED RELEASE ORAL NIGHTLY
Qty: 90 TABLET | Status: CANCELLED | OUTPATIENT
Start: 2025-03-31

## 2025-03-31 RX ORDER — CINACALCET 30 MG/1
30 TABLET, FILM COATED ORAL
Qty: 30 TABLET | Refills: 0 | Status: CANCELLED | OUTPATIENT
Start: 2025-03-31

## 2025-03-31 NOTE — TELEPHONE ENCOUNTER
----- Message from Shaista sent at 3/31/2025 10:29 AM CDT -----  Type: Needs Medical AdviceWho Called:  miriam   [facility] Best Call Back Number: [phone] Information: requesting call back in regards to pt being discharged from [facility] and is missing a few medication please advise

## 2025-03-31 NOTE — TELEPHONE ENCOUNTER
Nunu with [facility] home health called.  Patient is interested in going to a nursing facility.  [facility].  They are needing paper work.  H&P, LOV and medlist.

## 2025-03-31 NOTE — TELEPHONE ENCOUNTER
----- Message from [employer] sent at 3/31/2025  2:04 PM CDT -----  Type : Patient CallWho Called : Lian Townsend ([facility])Does the patient know what this is regarding?: Requesting a callback regarding pts. Lian advised that its hard to reach her via phone so communicating through teams or secured chat is preferred.Would the patient rather a call back or a response via [facility]? CallBest Call Back Number: [phone] (Zqv) [phone] Information:

## 2025-04-01 DIAGNOSIS — I10 PRIMARY HYPERTENSION: ICD-10-CM

## 2025-04-01 RX ORDER — PANTOPRAZOLE SODIUM 40 MG/1
40 TABLET, DELAYED RELEASE ORAL NIGHTLY
Qty: 30 TABLET | Refills: 0 | Status: SHIPPED | OUTPATIENT
Start: 2025-04-01

## 2025-04-01 RX ORDER — CINACALCET 30 MG/1
60 TABLET, FILM COATED ORAL
Qty: 60 TABLET | Refills: 0 | Status: SHIPPED | OUTPATIENT
Start: 2025-04-01

## 2025-04-01 NOTE — TELEPHONE ENCOUNTER
Home health nurse stopped in office today and requested refills on meds she does not have since leaving hospital. Presented med list from discharge reconciliation Report for patient. Patient has appt to be seen by me on 04/09/2025. Never seen before. In absence of her provider I have reviewed her chart, allergies and medication list and will send 30 days refill of Sensipar, Protonix, and Metoprol to assure compliance with her medications.

## 2025-04-04 RX ORDER — METOPROLOL SUCCINATE 25 MG/1
25 TABLET, EXTENDED RELEASE ORAL 2 TIMES DAILY
Qty: 60 TABLET | Refills: 0 | Status: SHIPPED | OUTPATIENT
Start: 2025-04-04

## 2025-04-04 RX ORDER — METOPROLOL SUCCINATE 25 MG/1
25 TABLET, EXTENDED RELEASE ORAL 2 TIMES DAILY
Qty: 60 TABLET | Refills: 0 | OUTPATIENT
Start: 2025-04-04

## 2025-04-14 DIAGNOSIS — I10 PRIMARY HYPERTENSION: ICD-10-CM

## 2025-04-14 DIAGNOSIS — E83.52 HYPERCALCEMIA: ICD-10-CM

## 2025-04-14 DIAGNOSIS — K21.9 GASTROESOPHAGEAL REFLUX DISEASE, UNSPECIFIED WHETHER ESOPHAGITIS PRESENT: ICD-10-CM

## 2025-04-14 RX ORDER — METOPROLOL SUCCINATE 25 MG/1
25 TABLET, EXTENDED RELEASE ORAL 2 TIMES DAILY
Qty: 180 TABLET | Refills: 0 | Status: CANCELLED | OUTPATIENT
Start: 2025-04-14

## 2025-04-14 RX ORDER — PANTOPRAZOLE SODIUM 40 MG/1
40 TABLET, DELAYED RELEASE ORAL NIGHTLY
Qty: 90 TABLET | Refills: 0 | Status: CANCELLED | OUTPATIENT
Start: 2025-04-14

## 2025-04-14 RX ORDER — CINACALCET 30 MG/1
60 TABLET, FILM COATED ORAL
Qty: 180 TABLET | Refills: 0 | Status: CANCELLED | OUTPATIENT
Start: 2025-04-14

## 2025-04-14 NOTE — TELEPHONE ENCOUNTER
----- Message from Maxine sent at 4/14/2025 12:19 PM CDT -----  Contact: pt  Type:  RX Refill RequestWho Called:   daughter Refill or New Rx:  refillRX Name and Strength:  pantoprazole (PROTONIX) 40 MG tablet cinacalcet (SENSIPAR) 30 MG Tab doxycycline (MONODOX) 100 MG capsule metoprolol succinate (TOPROL-XL) 25 MG 24 hr tablet How is the patient currently taking it? (ex. 1XDay):  as orderedIs this a 30 day or 90 day RX:  90Preferred Pharmacy with phone number:  [facility] - [facility] [address] [facility] [address]Xqeob: [phone] Fax: [phone] or Mail Order:  mailOrdering Provider:   earle Stokes Call Back Number:  [phone]- ptAdditional Information:  pt needs the rx sent to her mail order pharm not local

## 2025-04-14 NOTE — TELEPHONE ENCOUNTER
----- Message from Selvin Molina sent at 4/14/2025  1:00 PM CDT -----  Contact: pt    ----- Message -----  From: Maxine Liu  Sent: 4/14/2025  12:23 PM CDT  To: Izaiah TESFAYE Staff    Type:  RX Refill RequestWho Called:   daughter Refill or New Rx:  refillRX Name and Strength:  pantoprazole (PROTONIX) 40 MG tablet cinacalcet (SENSIPAR) 30 MG Tab doxycycline (MONODOX) 100 MG capsule metoprolol succinate (TOPROL-XL) 25 MG 24 hr tablet How is the patient currently taking it? (ex. 1XDay):  as orderedIs this a 30 day or 90 day RX:  90Preferred Pharmacy with phone number:  [facility] Mail [facility] - [place], [place] - [address] [facility] [address] [facility] [address]: [phone] Fax: [phone] or Mail Order:  mailOrdering Provider:   earle Stokes Call Back Number:  [phone]- ptAdditional Information:  pt needs the rx sent to her mail order pharm not local

## 2025-04-14 NOTE — TELEPHONE ENCOUNTER
"Spoke with daughter and number provided. She states her mother is "in the emergency room now". I explained that I sent her meds last week. States she need them sent to another pharmacy. However, sine she is in the ED I have advised that I will reach out tomorrow to see if she is admitted and will go from there. I have never seen patient and she has missed 2 appts that were made. Need to assure that I can see her to assess. Prior provider no longer at this clinic.   "

## 2025-04-16 DIAGNOSIS — R06.02 SOB (SHORTNESS OF BREATH): ICD-10-CM

## 2025-04-16 DIAGNOSIS — I26.99 BILATERAL PULMONARY EMBOLISM: ICD-10-CM

## 2025-04-16 DIAGNOSIS — Z73.6 LIMITATION OF ACTIVITIES DUE TO DISABILITY: ICD-10-CM

## 2025-04-16 DIAGNOSIS — I27.20 SEVERE PULMONARY HYPERTENSION: Primary | ICD-10-CM

## 2025-04-16 NOTE — PROGRESS NOTES
Spoke with daughter via telephone today. Patient was patient of NAKITA Montiel who is no longer with [facility]. Patient was scheduled to be seen by me on 04/14/25 but could not keep appt. Admitted to hospital on 04/14 at [facility]. Had [facility] prior to admission and needs provider to follow her care. I have expressed with daughter to reach out to  while inpatient to discuss goals for home care or short term rehab stay if that is the desire of the family. I have also recommended that we try to get care as much as possible in home if she will be returning home. I have not seen patient at all or assessed. However, I reviewed her inpatient stay information available on Care Everywhere and agree with her significant care at home and need for nursing level of care. To avoid further deterioration or health decline I have signed the prior home health orders. I am referring her to [facility] to see if it is available in her area as well. The goal is to provide continuity of care for the patient and her family.

## 2025-04-21 ENCOUNTER — TELEPHONE (OUTPATIENT)
Dept: FAMILY MEDICINE | Facility: CLINIC | Age: 75
End: 2025-04-21
Payer: MEDICARE

## 2025-04-21 NOTE — TELEPHONE ENCOUNTER
----- Message from Emani sent at 4/19/2025 11:01 AM CDT -----  Type: Appointment Request Caller is requesting appointment.   Name of Caller:Joshua - daughter When is the first available appointment?na Symptoms:discharge 4/18 Would the patient rather a call back or a response via [facility]? call Best Call Back Number:[phone] Additional Information: Dr Bliss called Joshua on Friday and told her to bring her mom Aleida Bliss to the clinic on Monday, April 21. System is not letting me make an appt in [place].     Please call Back to advise. Thanks!

## 2025-04-21 NOTE — TELEPHONE ENCOUNTER
Chapin for pt's daughter to call back, can schedule appt with Ms. Radha Bliss as Sebastian is no longer with [facility]

## 2025-04-22 ENCOUNTER — PATIENT OUTREACH (OUTPATIENT)
Dept: ADMINISTRATIVE | Facility: CLINIC | Age: 75
End: 2025-04-22
Payer: MEDICARE

## 2025-04-22 ENCOUNTER — TELEPHONE (OUTPATIENT)
Dept: HOME HEALTH SERVICES | Facility: CLINIC | Age: 75
End: 2025-04-22
Payer: MEDICARE

## 2025-04-22 NOTE — TELEPHONE ENCOUNTER
Contacted pt daughter Joshua to schedule an appointment regarding a referral placed for a tcc home visit with a nurse practitioner.     **Made note patient home address is not in [facility] service region, NP will see only once for TCC**    Patient has been scheduled

## 2025-04-22 NOTE — PROGRESS NOTES
C3 nurse spoke with Aleida Lewis for a TCC post hospital discharge follow up call. The patient has a scheduled [facility] appointment with Damaris Goodman NP 4/23/25 @8am.

## 2025-04-23 ENCOUNTER — OFFICE VISIT (OUTPATIENT)
Dept: HOME HEALTH SERVICES | Facility: CLINIC | Age: 75
End: 2025-04-23
Payer: MEDICARE

## 2025-04-23 DIAGNOSIS — R06.02 SOB (SHORTNESS OF BREATH): ICD-10-CM

## 2025-04-23 DIAGNOSIS — Z73.6 LIMITATION OF ACTIVITIES DUE TO DISABILITY: ICD-10-CM

## 2025-04-23 DIAGNOSIS — I10 PRIMARY HYPERTENSION: ICD-10-CM

## 2025-04-23 DIAGNOSIS — I33.0 BACTERIAL ENDOCARDITIS, UNSPECIFIED CHRONICITY: ICD-10-CM

## 2025-04-23 DIAGNOSIS — I26.99 BILATERAL PULMONARY EMBOLISM: Primary | ICD-10-CM

## 2025-04-23 DIAGNOSIS — Z95.2 HX OF MITRAL VALVE REPLACEMENT: ICD-10-CM

## 2025-04-23 RX ORDER — METOPROLOL SUCCINATE 25 MG/1
25 TABLET, EXTENDED RELEASE ORAL 2 TIMES DAILY
Qty: 180 TABLET | Refills: 3 | Status: SHIPPED | OUTPATIENT
Start: 2025-04-23 | End: 2025-04-25 | Stop reason: SDUPTHER

## 2025-04-23 NOTE — PROGRESS NOTES
[facility]  Transitional Care Management (TCM) Home Visit    Encounter Provider: HUGH FANG,   PCP: Radha Bliss, DNP, APRN  Consult Requested By: Kelseyzeke Bliss  Admit Date: 04/14/25   IP Discharge Date: 04/18/25  Hospital Length of Stay: 4 days  Days since discharge (from IP or SNF): 5 days  [facility] On Call Contact Note: 4/22  Hospital Diagnosis: Limitation of activities due to disability [Z73.6];SOB (shortness of breath) [R06.02];Bilateral pulmonary embolism [I26.99]     HISTORY OF PRESENT ILLNESS      Patient ID: Aleida Bliss is a 75 y.o. female was recently admitted to the hospital, this is their TCM encounter.    Hospital Course Synopsis:  Recent admission for bilateral PE complicated by current bacterial endocarditis . Discharged in good condition on eliquis.     Developments since hospitalization and current needs:   Ms. Bliss is doing well since discharge. Daughter present at visit today. States she is not back to her baseline functionality since January admission for endocarditis. She needs considerable assistance with ADLs. Lives with , daughter lives across the street.     Diagnosed with bilateral PE this admission, now on eliquis. She has intermittent SOB, sometimes at rest. Discussed using pulse ox to monitor oxygen and HR. Discussed ideal rate and normal variablility; also discussed when to seek emergent care for tachycardia or hypoxemia.Has follow up scheduled with pulmonology on May 28th.     Daughter is concerned for interaction between supplemental magnesium and doxycycline as mag may lower doxy effectiveness. She will discuss with Radha on Friday. If Radha wants pt to continue magnesium, daughter is requesting a prescription for insurance coverage.     She is followed by ID for endocarditis, currently only taking doxycycline as recommended. Levofloxacin stopped due to concern for PT prolongation. May 20 is next ID appt.  Also has a routine visit with primary care provider  Radha Bliss NP this Friday. Ms. Bliss needs a refill on multiple routine medications, however, Metoprolol was filled today in effort to get this mail order medication in a more timely manner.     She is currently receiving HH via [facility], going well.       Current ADL functionality -   lives [place] spouse  Mobility- Rollator and 1 person assist  Appetite is better, on megace  Elimination - adequate no complaints    Sleep- sleeping well, often has difficulty falling asleep or going back to sleep if wakes up in the middle of the night.Discussed melatonin dosing and duration. States was receiving in hospital and will trial at home.   Medication Management per daughter .   Medication needs at this time - metoprolol, calcium, protonix. Pended via PCP  Transportation to and from appointments via daughter       DECISION MAKING TODAY       Assessment & Plan: SEE HPI    1. Bilateral pulmonary embolism  Assessment & Plan:  Some intermittent SOB at rest persists. Resolves spontaneously.  Compliant with eliquis.  Reviewed SS to seek emergent care.    Orders:  -     Ambulatory referral/consult to [facility]    2. SOB (shortness of breath)  -     Ambulatory referral/consult to [facility]    3. Bacterial endocarditis, unspecified chronicity  Assessment & Plan:  Followed by ID.   Continue current management via doxycycline.  Reviewed infective signs and symptoms.   Reviewed cardiac signs and symptoms including lower extremity edema and preventative measures.   Follow up with ID as scheduled.         4. Hx of mitral valve replacement    5. Primary hypertension  -     metoprolol succinate (TOPROL-XL) 25 MG 24 hr tablet; Take 1 tablet (25 mg total) by mouth 2 (two) times daily.  Dispense: 180 tablet; Refill: 3    6. Limitation of activities due to disability  -     Ambulatory referral/consult to [facility]           ENVIRONMENT OF CARE      Family and/or Caregiver present at  visit?  Yes  Name of Caregiver: daughter  History provided by: patient    Advance Care Planning   Advanced Care Planning Status:  Patient has had an ACP conversation  Living Will: No  Power of : No  [place]: No    Does Caregiver have HCPoA: Yes  Changes today: na       Impression upon entering the home:  Physical Dwelling: single family home   Appearance of home environment: cleanliness: clean, walking pathways: clear, lighting: adequate, and home structure: sound structure  Functional Status: moderate assistance  Mobility: ambulatory with person assist  Nutritional access: adequate intake and access  Home Health: Yes,  Agency [facility]    DME/Supplies: rolling walker     Diagnostic tests reviewed/disposition: No diagnosic tests pending after this hospitalization.  Disease/illness education:  PE, endocarditis  Establishment or re-establishment of referral orders for community resources: No other necessary community resources.   Discussion with other health care providers: No discussion with other health care providers necessary.   Does patient have a PCP at [facility]? Yes   Repatriation plan with PCP? follow-up with PCP within 30d   Does patient have an ostomy (ileostomy, colostomy, suprapubic catheter, nephrostomy tube, tracheostomy, PEG tube, pleurex catheter, cholecystostomy, etc)? No  Were BPAs reviewed? Yes    Social History     Socioeconomic History    Marital status:    Tobacco Use    Smoking status: Never     Passive exposure: Never    Smokeless tobacco: Never   Substance and Sexual Activity    Alcohol use: No    Drug use: No    Sexual activity: Not Currently     Social Drivers of Health     Financial Resource Strain: Medium Risk (12/30/2024)    Overall Financial Resource Strain (CARDIA)     Difficulty of Paying Living Expenses: Somewhat hard   Food Insecurity: Food Insecurity Present (4/15/2025)    Received from [facility]    Hunger Vital Sign     Worried About Running Out of Food in the  Last Year: Sometimes true   Transportation Needs: No Transportation Needs (4/19/2025)    OASIS : Transportation     Lack of Transportation (Medical): No     Lack of Transportation (Non-Medical): No     Patient Unable or Declines to Respond: No   Physical Activity: Inactive (12/30/2024)    Exercise Vital Sign     Days of Exercise per Week: 0 days     Minutes of Exercise per Session: 0 min   Stress: No Stress Concern Present (12/30/2024)    [employer] - Occupational Stress Questionnaire     Feeling of Stress : Only a little   Housing Stability: Unknown (4/15/2025)    Received from [facility]    Housing Stability Vital Sign     Unable to Pay for Housing in the Last Year: No         OBJECTIVE:     Vital Signs:       Review of Systems   Constitutional:  Positive for activity change and fatigue. Negative for appetite change and fever.   HENT: Negative.     Eyes: Negative.    Respiratory:  Positive for shortness of breath. Negative for cough.    Cardiovascular:  Negative for chest pain and leg swelling.   Gastrointestinal:  Negative for abdominal pain, constipation, diarrhea and nausea.   Genitourinary:  Negative for difficulty urinating and hematuria.   Musculoskeletal:  Negative for joint swelling and neck stiffness.   Skin:  Negative for color change and wound.   Neurological:  Positive for weakness. Negative for dizziness, numbness and headaches.   Psychiatric/Behavioral:  Positive for sleep disturbance.        Physical Exam  Constitutional:       Appearance: She is ill-appearing.   HENT:      Head: Normocephalic and atraumatic.   Cardiovascular:      Rate and Rhythm: Normal rate and regular rhythm.      Pulses: Normal pulses.      Heart sounds: Normal heart sounds.   Pulmonary:      Effort: Pulmonary effort is normal.      Breath sounds: Normal breath sounds. No decreased air movement.   Abdominal:      General: Bowel sounds are normal.      Palpations: Abdomen is soft.    Musculoskeletal:         General: Normal range of motion.      Cervical back: Normal range of motion and neck supple.      Right lower leg: No edema.      Left lower leg: No edema.   Skin:     General: Skin is warm and dry.   Neurological:      General: No focal deficit present.      Mental Status: She is alert and oriented to person, place, and time. Mental status is at baseline.   Psychiatric:         Mood and Affect: Mood normal.         Behavior: Behavior normal.             INSTRUCTIONS FOR PATIENT:   Medication List on Discharge:     Medication List            Accurate as of April 23, 2025 10:33 PM. If you have any questions, ask your nurse or doctor.                CHANGE how you take these medications      magnesium oxide 400 mg (241.3 mg magnesium) tablet  Commonly known as: MAG-OX  TAKE 1 TABLET EVERY DAY  What changed: when to take this     pantoprazole 40 MG tablet  Commonly known as: PROTONIX  Take 1 tablet (40 mg total) by mouth nightly. NEEDS PRESCRIPTION SENT, REQUESTED  What changed: additional instructions            CONTINUE taking these medications      azelastine 137 mcg (0.1 %) nasal spray  Commonly known as: ASTELIN  1 spray (137 mcg total) by Nasal route 2 (two) times daily.     CALCITONIN (SALMON) NASL  1 spray by Nasal route 3 (three) times daily. Indications: hyperparathyroid     cinacalcet 30 MG Tab  Commonly known as: SENSIPAR  Take 2 tablets (60 mg total) by mouth daily with breakfast. NEEDS PRESCRIPTION SENT, REQUESTED     docusate sodium 100 MG capsule  Commonly known as: COLACE  Take 100 mg by mouth 2 (two) times daily. TO BUY OTC  Indications: constipation     doxycycline 100 MG capsule  Commonly known as: MONODOX  Take 100 mg by mouth 2 (two) times daily.     ELIQUIS 5 mg Tab  Generic drug: apixaban  Take 5 mg by mouth 2 (two) times daily. Take 2 tablets twice daily for 6 days then one tablet two times daily   Indications: treatment to prevent recurrence of a blood clot in the  lungs     ergocalciferol 50,000 unit Cap  Commonly known as: ERGOCALCIFEROL  TAKE 1 CAPSULE BY MOUTH EVERY MONDAY.     fluticasone 27.5 mcg/actuation nasal spray  Commonly known as: VERAMYST  2 sprays by Nasal route once daily. each nostril  Indications: inflammation of the nose due to an allergy     furosemide 40 MG tablet  Commonly known as: LASIX  Take 1 tablet (40 mg total) by mouth once daily.     levoFLOXacin 500 MG tablet  Commonly known as: LEVAQUIN  Take 500 mg by mouth 2 (two) times daily. 4/22/2025  On hold per Dr Parks until further notice.   Indications: endocarditis, an infection of a heart valve     magnesium glycinate 100 mg Tab  Take 1 tablet by mouth nightly.     megestroL 40 MG Tab  Commonly known as: MEGACE  Take 1 tablet (40 mg total) by mouth once daily.     metoprolol succinate 25 MG 24 hr tablet  Commonly known as: TOPROL-XL  Take 1 tablet (25 mg total) by mouth 2 (two) times daily.     montelukast 10 mg tablet  Commonly known as: SINGULAIR  TAKE 1 TABLET EVERY EVENING     NIFEREX (SUMALATE-QUATREFOLIC) 150 mg iron- 60 mg-1 mg Tab  Generic drug: iron ag,[id number]-[id number]  Take 1 tablet by mouth once daily.     sildenafil 20 mg Tab  Commonly known as: REVATIO  Take 1 tablet (20 mg total) by mouth 3 (three) times daily.              Medication Reconciliation:  Were medications changed on discharge? Yes  Were medications in the home? Yes  Is the patient taking the medications as directed? Yes  Does the patient/caregiver understand the medications and changes? Yes  Does updated med list accurately reflects meds patient is currently taking? Yes      Scheduled Follow-up, Appts Reviewed with Modifications if Needed: Yes  Future Appointments   Date Time Provider Department Center   4/24/2025 To Be Determined Lupe Lorenzo RN [facility] HOME None   4/24/2025 To Be Determined Slime Kellogg Patient Care Assistant [facility] None   4/25/2025 10:00 AM Rahda Bliss DNP, APRN [facility] MED  [facility]   4/28/2025 To Be Determined Lupe Lorenzo, RN [facility] HOME None   5/2/2025 To Be Determined Lupe Lorenzo, RN [facility] HOME None   5/5/2025 To Be Determined Lupe Lorenzo, RN [facility] HOME None   5/12/2025 To Be Determined Lupe Lorenzo, RN [facility] HOME None   5/19/2025 To Be Determined Lupe Lorenzo, RN [facility] HOME None   5/26/2025 To Be Determined Lupe Lorenzo, RN [facility] HOME None   5/28/2025 10:30 AM Maria De Jesus Chávez NP [facility]         Signature:      CORTES NP    Transition of Care Visit:  I have reviewed and updated the history and problem list.  I have reconciled the medication list.  I have discussed the hospitalization and current medical issues, prognosis and plans with the patient/family.

## 2025-04-24 DIAGNOSIS — K21.9 GASTROESOPHAGEAL REFLUX DISEASE, UNSPECIFIED WHETHER ESOPHAGITIS PRESENT: ICD-10-CM

## 2025-04-24 DIAGNOSIS — E83.52 HYPERCALCEMIA: ICD-10-CM

## 2025-04-24 NOTE — ASSESSMENT & PLAN NOTE
Some intermittent SOB at rest persists. Resolves spontaneously.  Compliant with eliquis.  Reviewed SS to seek emergent care.

## 2025-04-24 NOTE — ASSESSMENT & PLAN NOTE
Followed by ID.   Continue current management via doxycycline.  Reviewed infective signs and symptoms.   Reviewed cardiac signs and symptoms including lower extremity edema and preventative measures.   Follow up with ID as scheduled.

## 2025-04-25 ENCOUNTER — OFFICE VISIT (OUTPATIENT)
Dept: PRIMARY CARE CLINIC | Facility: CLINIC | Age: 75
End: 2025-04-25
Payer: MEDICARE

## 2025-04-25 VITALS
WEIGHT: 130.94 LBS | OXYGEN SATURATION: 100 % | HEIGHT: 66 IN | DIASTOLIC BLOOD PRESSURE: 82 MMHG | BODY MASS INDEX: 21.04 KG/M2 | SYSTOLIC BLOOD PRESSURE: 124 MMHG | HEART RATE: 84 BPM

## 2025-04-25 DIAGNOSIS — E21.3 HYPERPARATHYROIDISM: ICD-10-CM

## 2025-04-25 DIAGNOSIS — I33.0 ACUTE BACTERIAL ENDOCARDITIS: ICD-10-CM

## 2025-04-25 DIAGNOSIS — I27.20 SEVERE PULMONARY HYPERTENSION: ICD-10-CM

## 2025-04-25 DIAGNOSIS — R06.02 SOB (SHORTNESS OF BREATH): ICD-10-CM

## 2025-04-25 DIAGNOSIS — Z92.89 HISTORY OF RECENT HOSPITALIZATION: Primary | ICD-10-CM

## 2025-04-25 DIAGNOSIS — E83.52 HYPERCALCEMIA: ICD-10-CM

## 2025-04-25 DIAGNOSIS — I26.99 BILATERAL PULMONARY EMBOLISM: ICD-10-CM

## 2025-04-25 DIAGNOSIS — I10 PRIMARY HYPERTENSION: ICD-10-CM

## 2025-04-25 DIAGNOSIS — K21.9 GASTROESOPHAGEAL REFLUX DISEASE, UNSPECIFIED WHETHER ESOPHAGITIS PRESENT: ICD-10-CM

## 2025-04-25 DIAGNOSIS — H91.92 HEARING LOSS OF LEFT EAR, UNSPECIFIED HEARING LOSS TYPE: ICD-10-CM

## 2025-04-25 DIAGNOSIS — R54 FRAILTY: ICD-10-CM

## 2025-04-25 DIAGNOSIS — E83.42 HYPOMAGNESEMIA: ICD-10-CM

## 2025-04-25 PROBLEM — Z01.810 ENCOUNTER FOR PRE-OPERATIVE CARDIOVASCULAR CLEARANCE: Status: RESOLVED | Noted: 2023-06-21 | Resolved: 2025-04-25

## 2025-04-25 PROCEDURE — 1126F AMNT PAIN NOTED NONE PRSNT: CPT | Mod: CPTII,S$GLB,, | Performed by: NURSE PRACTITIONER

## 2025-04-25 PROCEDURE — 3079F DIAST BP 80-89 MM HG: CPT | Mod: CPTII,S$GLB,, | Performed by: NURSE PRACTITIONER

## 2025-04-25 PROCEDURE — 1101F PT FALLS ASSESS-DOCD LE1/YR: CPT | Mod: CPTII,S$GLB,, | Performed by: NURSE PRACTITIONER

## 2025-04-25 PROCEDURE — 99215 OFFICE O/P EST HI 40 MIN: CPT | Mod: S$GLB,,, | Performed by: NURSE PRACTITIONER

## 2025-04-25 PROCEDURE — 3288F FALL RISK ASSESSMENT DOCD: CPT | Mod: CPTII,S$GLB,, | Performed by: NURSE PRACTITIONER

## 2025-04-25 PROCEDURE — 3074F SYST BP LT 130 MM HG: CPT | Mod: CPTII,S$GLB,, | Performed by: NURSE PRACTITIONER

## 2025-04-25 RX ORDER — PANTOPRAZOLE SODIUM 40 MG/1
40 TABLET, DELAYED RELEASE ORAL NIGHTLY
Qty: 90 TABLET | Refills: 1 | Status: SHIPPED | OUTPATIENT
Start: 2025-04-25

## 2025-04-25 RX ORDER — SILDENAFIL CITRATE 20 MG/1
20 TABLET ORAL 3 TIMES DAILY
Qty: 270 TABLET | Refills: 1 | Status: ACTIVE | OUTPATIENT
Start: 2025-04-25 | End: 2026-04-25

## 2025-04-25 RX ORDER — PANTOPRAZOLE SODIUM 40 MG/1
40 TABLET, DELAYED RELEASE ORAL NIGHTLY
Qty: 90 TABLET | Refills: 1 | OUTPATIENT
Start: 2025-04-25

## 2025-04-25 RX ORDER — MEGESTROL ACETATE 40 MG/1
40 TABLET ORAL DAILY
Qty: 90 TABLET | Refills: 1 | Status: SHIPPED | OUTPATIENT
Start: 2025-04-25 | End: 2026-04-25

## 2025-04-25 RX ORDER — CINACALCET 30 MG/1
60 TABLET, FILM COATED ORAL
Qty: 180 TABLET | Refills: 1 | Status: SHIPPED | OUTPATIENT
Start: 2025-04-25

## 2025-04-25 RX ORDER — METOPROLOL SUCCINATE 25 MG/1
25 TABLET, EXTENDED RELEASE ORAL 2 TIMES DAILY
Qty: 180 TABLET | Refills: 1 | Status: SHIPPED | OUTPATIENT
Start: 2025-04-25

## 2025-04-25 RX ORDER — LANOLIN ALCOHOL/MO/W.PET/CERES
1 CREAM (GRAM) TOPICAL DAILY
Qty: 90 TABLET | Refills: 1 | Status: CANCELLED | OUTPATIENT
Start: 2025-04-25

## 2025-04-25 RX ORDER — SILDENAFIL CITRATE 20 MG/1
20 TABLET ORAL 3 TIMES DAILY
Qty: 270 TABLET | Refills: 1 | Status: SHIPPED | OUTPATIENT
Start: 2025-04-25 | End: 2025-04-25 | Stop reason: SDUPTHER

## 2025-04-25 RX ORDER — CINACALCET 30 MG/1
60 TABLET, FILM COATED ORAL
Qty: 180 TABLET | Refills: 1 | OUTPATIENT
Start: 2025-04-25

## 2025-04-25 NOTE — PROGRESS NOTES
Patient ID: Aleida Bliss is a 75 y.o. female.    Chief Complaint: Hospital Follow Up/Establish/Home health orders need signing  Last seen in primary care by ROHIT Rasmussen on 10/19/2022  First time seeing me in the primary care clinic  She is accompanied today by her daughter and granddaughter    History of Present Illness    CHIEF COMPLAINT:  Patient presents today for follow up after hospitalization for bilateral pulmonary embolism and endocarditis.    RECENT HOSPITALIZATION:  She was admitted to [facility] from April 14th to April 18th with diagnoses of bilateral pulmonary embolism and endocarditis.    Below is a copy of information from discharge summary:    Admit Date:   4/14/2025 7:27 PM    Discharge Date:   Discharge Today: 4/18/2025    Admitting Physician:   No admitting provider for patient encounter.     Discharge Physician:   Gasper Chou MD    Reason for Admission/Admission Diagnoses:   Present on Admission:  Bilateral pulmonary embolism (HCC)  Hypertension  History of mycobacterial infection    Discharge Diagnoses:   Active Hospital Problems   Diagnosis Date Noted   Bilateral pulmonary embolism (HCC) 04/14/2025   History of mitral valve replacement with bioprosthetic valve 04/14/2025   History of bacterial endocarditis 04/14/2025   Hypertension 04/14/2025   History of mycobacterial infection 04/14/2025     Resolved Hospital Problems     History of Present Illness:   Patient is a 75-year-old female with past medical history of hypertension, CVA in October 2024 (not able to obtain records), mitral valve replacement (04/ 2024), recent bacterial endocarditis, acid-fast bacteremia who was transferred to ED from [facility] for higher level of care after CT found bilateral pulmonary emboli with right heart strain. Patient reported approximately 3 days of sharp, stabbing chest pain that was non-resolving, worsened by exertion and deep inhalation. Denied shortness of breath, nausea, vomiting, numbness and tingling  in upper extremities, headache, vision changes. Additional complaints of bilateral anterior leg pain 3 days. Denied recent sick contacts or prolonged periods of immobility or travel. Patient is non-smoker, non-drinker. No other acute complaints. Patient was a poor historian, most of medical history was obtained via  and daughter. 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 CT surgery did not believe patient was a candidate for repeat MVR. Underwent 10 weeks of antibiotic therapy with imipenem/amikacin/levofloxacin. Per ID at , patient would need to be on levofloxacin 500 mg every other day and doxycycline 100 mg BID for at least 1 year. No PMH of DVT or PE. Patient admitted to hospitalist services. Pulmonary consulted. Patient continued on Lovenox therapeutic dose. Bilateral lower extremity venous ultrasound on 4/15/2025 was negative for DVT. EKG on 4/14/2025 =Normal sinus rhythm.Left axis deviation.Right bundle branch block . Serial cardiac enzymes on 4/14/2025 = negative x 3.Echocardiogram on 4/15/2025 = EF= 55-60%. ordered to assess for right ventricular function. Grade 3 Diastolic Dysfunction. Mitral valve bioprothesis visualized. There is a moderate size vegetation or mass on the mitral valve. Mild to moderate tricuspid regurg. No tricuspid valve vegetations. Blood cultures x 2 on 4/14/2025 remain negative. AFB culture ordered on 4/14/2025. Procalcitonin with mild elevation on 4/15/2025. Lactic acid was within normal limits on admit. Patient resumed on previous home regimen of doxycycline 100 mg p.o. twice daily and Levaquin 500 milligrams p.o. q. other day. ID consulted for any recommendations. H&H monitored showed some decrease with no overt bleeding seen. FOBT ordered. Patient with PPI daily. H&H showed some subsequent intermittent fluctuations.. Electrolytes monitored and corrected as  needed. Patient quickly weaned off to room air with stable O2 sats. Lovenox switched to Eliquis 10 mg p.o. twice daily x 1 week followed by Eliquis 5 mg p.o. twice daily. Case discussed with Dr. Parks on 4/18/2025. From ID standpoint, she is fine to go. She wasn't admitted for any ID issue, she's been on the Mycobacterium meds since January and there's no new infection. The f/u AFB blood cx will take 6 wks to result. She is well plugged into [facility] ID at [facility] and you can just make sure she has a close f/u in the next 1-2 wks to decide if any changes need to be made.  is consulted to help arrange outpatient follow-up with Dr. Cardozo. Has a follow up appointment on 5/20/25 at 9:30am. If a sooner appointment becomes available, Dr Cardozo office will contact her. Patient currently without any acute complaints. All consultants have cleared patient for discharge. Over the course of her stay she is now clinically improved and hemodynamically stable for discharge      CURRENT SYMPTOMS:  She reports hemoptysis with small amounts of blood in tissue, shortness of breath, and leg soreness particularly when lying in bed at night. She also notes left ear hearing loss and mild diarrhea.    MEDICAL HISTORY:  She has a history of valve replacement and hyperparathyroidism.    CURRENT MEDICATIONS:  She is taking:  - Doxycycline 100 mg twice daily  - Eliquis 5 mg twice daily  - Protonix 40 mg at night  - Magnesium 400 mg daily  - Megace 40 mg daily for appetite  - Revatio 20 mg 3 times daily      ROS:  General: -fever, -chills, -fatigue, -weight gain, -weight loss  Eyes: -vision changes, -redness, -discharge  ENT: -ear pain, -nasal congestion, -sore throat, +hearing loss  Cardiovascular: -chest pain, -palpitations, +lower extremity edema  Respiratory: -cough, +shortness of breath, +hemoptysis  Gastrointestinal: -abdominal pain, -nausea, -vomiting, +diarrhea, -constipation, -blood in stool  Genitourinary:  -dysuria, -hematuria, -frequency  Musculoskeletal: -joint pain, -muscle pain, +limb pain  Skin: -rash, -lesion  Neurological: -headache, -dizziness, -numbness, -tingling  Psychiatric: -anxiety, -depression, -sleep difficulty         Physical Exam    General: No acute distress. Slightly frail appearance, well kept and responds appropriately.  Eyes: EOMI. Sclerae anicteric.  HENT: Normocephalic. Atraumatic. Nares patent. Moist oral mucosa.  Ears: Bilateral TMs clear. Bilateral EACs clear.  Cardiovascular: Regular rate. Regular rhythm. No murmurs. No rubs. No gallops. Normal S1, S2.  Respiratory: Normal respiratory effort. Clear to auscultation bilaterally. No rales. No rhonchi. No wheezing.  Abdomen: Soft. Non-tender. Non-distended. Normoactive bowel sounds.  Musculoskeletal: No  obvious deformity, she is in a wheelchair and requires moderate assist to get up  Extremities: No lower extremity edema. No Swelling in legs.  Neurological: Alert & oriented x3. No slurred speech.   Psychiatric: Normal mood. Normal affect. Good insight. Good judgment.  Skin: Warm. Dry. No rash. Contusions or bruising present. .         Assessment & Plan    IMPRESSION:  - Reviewed medication list and made adjustments based on current needs and recent hospitalization for bilateral pulmonary embolism and endocarditis.  - Assessed risk of antibiotic-induced diarrhea due to recent hospital stay and current doxycycline regimen.  - Evaluated calcium and magnesium levels, noting recent fluctuations and need for monitoring.  - Determined need for cardiology follow-up based on history of valve replacement, recent clots, and endocarditis.  - Assessed for shortness of breath and potential complications related to pulmonary embolism.  - Reviewed recent lab work, noting anemia and slightly elevated WBC count indicative of ongoing infection.  - Evaluated nutritional status based on low albumin levels.  - Considered need for audiology  "evaluation.    ANTIBIOTIC TREATMENT:  - Continued doxycycline 100 mg twice daily until finished. Prescribed at discharge  - Explained importance of probiotics in maintaining gut health while on antibiotics.  - Educated on signs of antibiotic-induced diarrhea and when to seek medical attention.  - Patient to watch for signs of diarrhea and report if it becomes severe.  - Recommend incorporating probiotic-rich foods like sauerkraut or Greek yogurt into diet, or take a probiotic supplement daily.    OSTEOPOROSIS:  - Continue Calcitonin nasal spray (Miacalcin) 1 spray 3 times daily for osteoporosis.    HYPERPARATHYROIDISM:  - Discussed importance of calcium levels in relation to hyperparathyroidism.  - Referred to endocrinology for management of hyperparathyroidism.    ENDOCARDITIS:  - Follow up with infectious disease doctor as scheduled for endocarditis management.    ELECTROLYTE IMBALANCE:  - Discussed role of magnesium in the body and its potential side effects, including diarrhea.  - Decreased magnesium oxide from 400 mg to 100 mg daily, pending lab results.  - Ordered CBC, CMP, and magnesium level in 3 weeks, to be collected by home health.    ANTICOAGULATION THERAPY:  - Continued Eliquis 5 mg twice daily.    GASTROESOPHAGEAL REFLUX DISEASE:  - Continued Protonix 40 mg nightly.    APPETITE STIMULATION:  - Continued Megace 40 mg every other day for appetite stimulation.  Daughter states very good appetite and has to "hold off" on medication some days because her appetite is "too good"  Weight has continued improving over past months    PULMONARY HYPERTENSION:  - Continued sildenafil (Revatio) 20 mg 3 times daily.    HYPERTENSION:  BLOOD PRESSURE controlled  Patient will continue with Care at Home which I ordered after being discharged from hospital.    BP Readings from Last 3 Encounters:   04/30/25 120/88   04/28/25 (!) 119/90   04/25/25 124/76      - Continued metoprolol 25 mg twice daily.    HEARING LOSS:  - " Referred to audiology department at [facility] on [address] in [place] for hearing test and ENT department.    CARDIOVASCULAR DISEASE:  - Referred to Dr. Hinkle (cardiologist) at this office.  - Patient to report any instances of coughing up blood.    FOLLOW-UP AND MONITORING:  - Home health to collect labs in 2 weeks and bring to office.  - Follow up monthly, either by NP at home or in office, and in 4 months.  - Contact office if experiencing a lot of diarrhea, coughing up a lot of blood, or running a fever.        Visit today included increased complexity associated with the care of the chronic problems which were addressed above. This correlates with managing longitudinal care of the patient due to the serious and/or complex managed problem(s).    I have spent more than 50 minutes with this patient during this appointment.    Wt Readings from Last 3 Encounters:   04/25/25 1001 59.4 kg (130 lb 15.3 oz)   04/19/25 1250 54 kg (119 lb)   03/29/25 1145 46.7 kg (103 lb)      Medication List with Changes/Refills   Current Medications    AZELASTINE (ASTELIN) 137 MCG (0.1 %) NASAL SPRAY    1 spray (137 mcg total) by Nasal route 2 (two) times daily.    CALCITONIN,SALMON,SYNTHETIC (CALCITONIN, SALMON, NASL)    1 spray by Nasal route 3 (three) times daily. Indications: hyperparathyroid    DOCUSATE SODIUM (COLACE) 100 MG CAPSULE    Take 100 mg by mouth 2 (two) times daily. TO BUY OTC  Indications: constipation    DOXYCYCLINE (MONODOX) 100 MG CAPSULE    Take 100 mg by mouth 2 (two) times daily.    MAGNESIUM OXIDE (MAG-OX) 400 MG (241.3 MG MAGNESIUM) TABLET    TAKE 1 TABLET EVERY DAY    MELATONIN 10 MG CHEW    Take 1 tablet by mouth nightly as needed (insomnia). Pt to take 1/2 gummy (5mg) to 1 gummy (10mg) PO Nightly PRN for insomnia  Indications: insomnia   Changed and/or Refilled Medications    Modified Medication Previous Medication    APIXABAN (ELIQUIS) 5 MG TAB apixaban (ELIQUIS) 5 mg Tab       Take 1 tablet (5 mg  total) by mouth 2 (two) times daily. Take 2 tablets twice daily for 6 days then one tablet two times daily    Take 5 mg by mouth 2 (two) times daily. Take 2 tablets twice daily for 6 days then one tablet two times daily   Indications: treatment to prevent recurrence of a blood clot in the lungs    CINACALCET (SENSIPAR) 30 MG TAB cinacalcet (SENSIPAR) 30 MG Tab       Take 2 tablets (60 mg total) by mouth daily with breakfast. NEEDS PRESCRIPTION SENT, REQUESTED    Take 2 tablets (60 mg total) by mouth daily with breakfast. NEEDS PRESCRIPTION SENT, REQUESTED    MAGNESIUM GLYCINATE 100 MG TAB magnesium glycinate 100 mg Tab       Take 1 tablet by mouth nightly.    Take 1 tablet by mouth nightly.    MEGESTROL (MEGACE) 40 MG TAB megestroL (MEGACE) 40 MG Tab       Take 1 tablet (40 mg total) by mouth once daily.    Take 1 tablet (40 mg total) by mouth once daily.    METOPROLOL SUCCINATE (TOPROL-XL) 25 MG 24 HR TABLET metoprolol succinate (TOPROL-XL) 25 MG 24 hr tablet       Take 1 tablet (25 mg total) by mouth 2 (two) times daily.    Take 1 tablet (25 mg total) by mouth 2 (two) times daily.    PANTOPRAZOLE (PROTONIX) 40 MG TABLET pantoprazole (PROTONIX) 40 MG tablet       Take 1 tablet (40 mg total) by mouth nightly. NEEDS PRESCRIPTION SENT, REQUESTED    Take 1 tablet (40 mg total) by mouth nightly. NEEDS PRESCRIPTION SENT, REQUESTED    SILDENAFIL (REVATIO) 20 MG TAB sildenafil (REVATIO) 20 mg Tab       Take 1 tablet (20 mg total) by mouth 3 (three) times daily.    Take 1 tablet (20 mg total) by mouth 3 (three) times daily.   Discontinued Medications    ERGOCALCIFEROL (ERGOCALCIFEROL) 50,000 UNIT CAP    TAKE 1 CAPSULE BY MOUTH EVERY MONDAY.    FLUTICASONE (VERAMYST) 27.5 MCG/ACTUATION NASAL SPRAY    2 sprays by Nasal route as needed for Allergies. each nostril daily PRN  Indications: inflammation of the nose due to an allergy    FUROSEMIDE (LASIX) 40 MG TABLET    Take 1 tablet (40 mg total) by mouth once daily.     LEVOFLOXACIN (LEVAQUIN) 500 MG TABLET    Take 500 mg by mouth 2 (two) times daily. 4/22/2025  On hold per Dr Parks until further notice.   Indications: endocarditis, an infection of a heart valve    MONTELUKAST (SINGULAIR) 10 MG TABLET    TAKE 1 TABLET EVERY EVENING        No follow-ups on file.    This note was generated with the assistance of ambient listening technology. Verbal consent was obtained by the patient and accompanying visitor(s) for the recording of patient appointment to facilitate this note. I attest to having reviewed and edited the generated note for accuracy, though some syntax or spelling errors may persist. Please contact the author of this note for any clarification.

## 2025-04-30 RX ORDER — DOXYCYCLINE 100 MG/1
100 CAPSULE ORAL 2 TIMES DAILY
Qty: 180 CAPSULE | Refills: 0 | OUTPATIENT
Start: 2025-04-30

## 2025-05-20 RX ORDER — CALCITONIN SALMON 200 [IU]/.09ML
SPRAY, METERED NASAL
Qty: 22 EACH | Refills: 3 | Status: SHIPPED | OUTPATIENT
Start: 2025-05-20

## 2025-06-02 ENCOUNTER — PATIENT OUTREACH (OUTPATIENT)
Dept: ADMINISTRATIVE | Facility: CLINIC | Age: 75
End: 2025-06-02
Payer: MEDICARE

## 2025-06-02 ENCOUNTER — TELEPHONE (OUTPATIENT)
Dept: FAMILY MEDICINE | Facility: CLINIC | Age: 75
End: 2025-06-02
Payer: MEDICARE

## 2025-06-11 ENCOUNTER — TELEPHONE (OUTPATIENT)
Dept: FAMILY MEDICINE | Facility: CLINIC | Age: 75
End: 2025-06-11
Payer: MEDICARE

## 2025-06-11 NOTE — TELEPHONE ENCOUNTER
"Communicated with  nurse via Secure Chat on yesterday and sent the following:    "Thanks for reaching out. I put in an order prior for care at home. I will look into the referral. Can the family get her in to see me? I am in [facility] tomorrow until 12 and [place] all day Friday."    Staff reached out to patient family scheduled appt for Friday 06/13/25. Advised to go to ED if symptoms worsen.   "

## 2025-06-13 ENCOUNTER — OFFICE VISIT (OUTPATIENT)
Dept: PRIMARY CARE CLINIC | Facility: CLINIC | Age: 75
End: 2025-06-13
Payer: MEDICARE

## 2025-06-13 VITALS
HEIGHT: 65 IN | RESPIRATION RATE: 17 BRPM | SYSTOLIC BLOOD PRESSURE: 118 MMHG | BODY MASS INDEX: 22.03 KG/M2 | HEART RATE: 89 BPM | OXYGEN SATURATION: 99 % | DIASTOLIC BLOOD PRESSURE: 80 MMHG | WEIGHT: 132.25 LBS

## 2025-06-13 DIAGNOSIS — E16.2 HYPOGLYCEMIA: ICD-10-CM

## 2025-06-13 DIAGNOSIS — Z92.89 HISTORY OF RECENT HOSPITALIZATION: ICD-10-CM

## 2025-06-13 DIAGNOSIS — R60.0 EDEMA OF BOTH LOWER EXTREMITIES: Primary | ICD-10-CM

## 2025-06-13 DIAGNOSIS — Z73.6 LIMITATION OF ACTIVITIES DUE TO DISABILITY: ICD-10-CM

## 2025-06-13 DIAGNOSIS — R06.02 SHORTNESS OF BREATH: ICD-10-CM

## 2025-06-13 DIAGNOSIS — E04.2 MULTIPLE THYROID NODULES: ICD-10-CM

## 2025-06-13 DIAGNOSIS — Z87.01 HISTORY OF RECENT PNEUMONIA: ICD-10-CM

## 2025-06-13 DIAGNOSIS — I10 PRIMARY HYPERTENSION: ICD-10-CM

## 2025-06-13 LAB
ALBUMIN SERPL-MCNC: 2.1 G/DL (ref 3.5–5)
BNP SERPL-MCNC: 1250 PG/ML (ref 15–266)
BUN SERPL-MCNC: 20 MG/DL (ref 5–25)
CALCIUM SERPL-MCNC: 8.7 MG/DL (ref 8.8–10.6)
CHLORIDE SERPL-SCNC: 115 MMOL/L (ref 100–109)
CO2 SERPL-SCNC: 19 MMOL/L (ref 22–33)
CREAT SERPL-MCNC: 1.16 MG/DL (ref 0.55–1.02)
GFR SERPLBLD CREATININE-BSD FMLA CKD-EPI: 49 ML/MIN/{1.73_M2}
GLUCOSE SERPL-MCNC: 78 MG/DL (ref 70–100)
PHOSPHATE SERPL-MCNC: 2.7 MG/DL (ref 2.3–4.7)
POTASSIUM SERPL-SCNC: 3.9 MMOL/L (ref 3.5–5.1)
SODIUM SERPL-SCNC: 140 MMOL/L (ref 136–145)

## 2025-06-13 PROCEDURE — 3074F SYST BP LT 130 MM HG: CPT | Mod: CPTII,S$GLB,, | Performed by: NURSE PRACTITIONER

## 2025-06-13 PROCEDURE — G2211 COMPLEX E/M VISIT ADD ON: HCPCS | Mod: S$GLB,,, | Performed by: NURSE PRACTITIONER

## 2025-06-13 PROCEDURE — 1126F AMNT PAIN NOTED NONE PRSNT: CPT | Mod: CPTII,S$GLB,, | Performed by: NURSE PRACTITIONER

## 2025-06-13 PROCEDURE — 99214 OFFICE O/P EST MOD 30 MIN: CPT | Mod: S$GLB,,, | Performed by: NURSE PRACTITIONER

## 2025-06-13 PROCEDURE — 3079F DIAST BP 80-89 MM HG: CPT | Mod: CPTII,S$GLB,, | Performed by: NURSE PRACTITIONER

## 2025-06-13 PROCEDURE — 3288F FALL RISK ASSESSMENT DOCD: CPT | Mod: CPTII,S$GLB,, | Performed by: NURSE PRACTITIONER

## 2025-06-13 PROCEDURE — 1101F PT FALLS ASSESS-DOCD LE1/YR: CPT | Mod: CPTII,S$GLB,, | Performed by: NURSE PRACTITIONER

## 2025-06-13 RX ORDER — FUROSEMIDE 20 MG/1
TABLET ORAL
Qty: 30 TABLET | Refills: 0 | Status: ON HOLD | OUTPATIENT
Start: 2025-06-13 | End: 2025-06-30

## 2025-06-13 NOTE — PROGRESS NOTES
Patient ID: Aleida Bliss is a 75 y.o. female.    Chief Complaint: Follow-up (Er visit and swelling in both legs)  She was seen and admitted at [facility] on 05/20/2025. Discharged on 05/30/2025  I last saw her on 04/25/2025  She is accompanied by her daughter today     History of Present Illness    CHIEF COMPLAINT:  Patient presents today for follow up of swelling in lower extremities.    RECENT HOSPITALIZATION:  She was recently hospitalized for vomiting, dehydration, and pneumonia. During hospitalization, she developed acute kidney insufficiency and hypercalcemia with elevated calcium levels. She experienced blood sugar drops requiring continuous glucose monitoring.  Below is a copy of Discharge information from discharge summary in Care Everywhere:  Patient ID:  Aleida Bliss  [id number]  75 y.o.  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     Hypercalcemia was managed with IV bisphosphonate and fluids.  Likely secondary to known hyperparathyroidism,  with suspicion of anti-parathyroid med noncompliance.  Required ongoing phosphorus and magnesium supplementation due to parathyroid and calcium related renal wasting of phos and Mag.  - Endocrine follow-up scheduled   - plan is to discontinue outpatient cinacalcet and calcitonin, will likely benefit from ongoing IV bisphosphonate therapy.  - Discharged on oral Mag and phosphorus, with orders placed for lab re-evaluation prior to endo visit     Intermittent hypoglycemia in early morning hours initially suspected to be secondary to poor p.o. intake, however asymptomatic hypoglycemia persisted despite adequate intake.  Suspect this is likely due to intrinsic liver disease which is most likely secondary to cardiac disease.  Imaging suggestive of likely cirrhosis, also found to have portal vein-middle hepatic vein fistula.  - Hepatology follow up scheduled ---will have ongoing discussion of need for closure of fistula  - Discharged on Flagyl  - Not given script for lactulose as patient is actively having diarrhea, but provided instructions to ensure 2-3 soft bowel movements per day  - Referred to nutrition     Endocrinology also assisted us in working up etiologies of asymptomatic hypoglycemia.  Though less likely to be secondary to insulinoma, patient underwent approximately 48 hour fast to rule it out. However critical hypoglycemia labs all pending at time of discharge  - Endocrine to follow up insulinoma workup in outpatient setting  - Endocrinology assisted in supplying and educating patient and family on use of CGM to monitor hypoglycemia safely in outpatient setting  - Given instructions for treatment of hypoglycemia     Multiple thyroid nodules noted on imaging.  Prior sestamibi scan negative for functioning adenoma, however did not do extended washout scan.  - IR referral placed for biopsy of left intraparenchymal thyroid nodule as well as PTH washout of other 2 nodules.     She was treated for community-acquired  pneumonia.  AFB blood culture still pending at time of discharge.  Low suspicion that NTM infection contributed to presentation.  Was continued on home doxycycline, without adjustment.     PCP FOLLOW UP:  Please ensure IR biopsy scheduled (EJ)  Follow Cr, had fluctuating LM on baseline CKD2-3  Follow up/work up diarrhea if persistent  Follow nutrition and weight        CURRENT SYMPTOMS:  She reports bilateral leg swelling that developed after hospital discharge on the 20th. She also experiences shortness of breath with exertion, particularly when walking and getting in and out of bed.    MEDICAL HISTORY:  She has a history of endocarditis in April. Echocardiogram from 2024 showed EF of 65-70%. GFR has improved to 57 from previous values in the 40s.    CURRENT MEDICATIONS:  She is currently taking doxycycline and potassium phosphate supplements 1 packet 4 times daily. She uses Dexcom G7 for glucose monitoring.      ROS:  General: -fever, -chills, -fatigue, -weight gain, -weight loss  Eyes: -vision changes, -redness, -discharge  ENT: -ear pain, -nasal congestion, -sore throat  Cardiovascular: -chest pain, -palpitations, +lower extremity edema  Respiratory: -cough, -shortness of breath, +exertional dyspnea, +shortness of breath lying down  Gastrointestinal: -abdominal pain, -nausea, -vomiting, -diarrhea, -constipation, -blood in stool  Genitourinary: -dysuria, -hematuria, -frequency  Musculoskeletal: -joint pain, -muscle pain, +limb pain, + swelling  Skin: -rash, -lesion  Neurological: -headache, -dizziness, -numbness, -tingling  Psychiatric: -anxiety, -depression, -sleep difficulty         Physical Exam    Vitals: Blood pressure: 118/80. Heart rate: 89.  General: No acute distress. Well-developed. Well-nourished.  Eyes: EOMI. Sclerae anicteric.  HENT: Normocephalic. Atraumatic. Nares patent. Moist oral mucosa.  Ears: Bilateral TMs clear. Bilateral EACs clear.  Cardiovascular: Regular rate. Regular rhythm. No murmurs.  No rubs. No gallops. Normal S1, S2. Heart sounds loud and full.  Respiratory: Normal respiratory effort. Clear to auscultation bilaterally. No rales. No rhonchi. No wheezing.  Abdomen: Soft. Non-tender. Non-distended. Normoactive bowel sounds.  Musculoskeletal: No  obvious deformity.  Extremities: 2+ pitting edema bilateral lower extremities. 3+ pitting edema bilateral lower extremities. She is in a wheelchair  Neurological: Alert & oriented x3. No slurred speech. Normal gait.  Psychiatric: Normal mood. Normal affect. Good insight. Good judgment.  Skin: Warm. Dry. No rash.         Assessment & Plan    R60.9 Edema, unspecified  N18.30 Chronic kidney disease, Stage III unspecified  R06.02 Shortness of breath  N17.9 Acute kidney failure, unspecified  E83.52 Hypercalcemia  E16.2 Hypoglycemia, unspecified  Z87.01 Personal history of pneumonia (recurrent)  Z79.2 Long term (current) use of antibiotics      EDEMA:  - Assessed significant 2+ to 3+ pitting edema in bilateral lower extremities causing difficulty with footwear.  - Ordered stat laboratory studies to guide diuretic therapy, which will be initiated based on results while balancing fluid removal with renal function.  - Instructed patient to elevate legs above heart level for 10-15 minutes at a time as tolerated and recommended use of bedside commode to manage increased urination from anticipated diuretic therapy.    :  - Monitored heart rate at 89 with loud and full heart sounds, concerns of possible fluid overload, though EF of 65-70% is within normal limits.  - Ordered stat chest radiograph and B-type natriuretic peptide (BNP) to assess for heart failure and guide diuretic therapy.  - Referred to cardiology for evaluation of fluid overload and potential heart failure.    CHRONIC KIDNEY DISEASE:  - Monitored creatinine level of 57, improved from previous values in the 40s, indicating stable kidney function.  - Ordered stat renal function tests to evaluate current  status before initiating diuretic therapy.  - Plan to balance fluid removal with preservation of renal function when administering diuretics.    SHORTNESS OF BREATH:  - Evaluated dyspnea occurring with ambulation and when assuming recumbent position, likely secondary to fluid overload.  - Instructed patient to elevate feet to reduce fluid retention and improve breathing.  - Discussed signs of worsening dyspnea that would warrant emergency evaluation and advised patient to contact the office if shortness of breath worsens.    ABNORMAL CHEST IMAGING:  RECENT PNEUMONIA:  - Reviewed chest radiograph from the 20th showing improved aeration in left lower lung but more confluent opacity in right lung.  - Ordered stat chest radiograph to assess current pulmonary status, fluid accumulation, and evaluate for residual pneumonia.  - Results will guide diuretic therapy and monitoring for pulmonary edema.    ACUTE KIDNEY FAILURE:  - Monitored recovery from acute kidney insufficiency that occurred during hospitalization, with creatinine now at 57, improved from values in the 40s.  - Ordered renal function tests today to ensure kidney function remains stable.    HYPERCALCEMIA:  - Monitored calcium levels following hypercalcemia during hospitalization.  - Continued potassium phosphate packets, 1 packet 4 times daily.    HYPOGLYCEMIA:  - Monitored glucose trends, noting resolution of previous hypoglycemic episodes that occurred during hospitalization.  - Current glucose level of 123 is stable.  - Recommend purchase of an inexpensive glucose meter from [employer] and instructed patient on home glucose monitoring protocol.    HISTORY OF INFECTIOUS DISEASES:  - Noted history of endocarditis in April, an infection affecting the heart.    HISTORY OF PNEUMONIA:  - Documented recent pneumonia during hospitalization.  - Chest radiograph ordered (as noted under Abnormal Chest Imaging) will also evaluate for residual pneumonia.    LONG-TERM  ANTIBIOTIC USE:  - Continue  d doxycycline capsules after completion of current tablet formulation.  - Educated patient on importance of probiotics for gut health after recent antibiotic use.    Visit today included increased complexity associated with the care of the episodic problems: CKD, HTN, recent pneumonia, sever edema, SOB,hypokalemia, hypercalcemia, which were addressed. This correlates with managing longitudinal care of the patient due to the serious and/or complex managed problem(s).      FOLLOW-UP:  - Follow up when contacted with lab and imaging results, can be fit in on Mondays or Fridays.     2:45 pm- Discussed results of CXRay [place] CORDOBA with daughter (Joshua) over the phone.   3:40 pm- Discussed elevated BNP and concerns about potential CHF, get fluid pills today and start, TO ED if any worsening symptoms  Her GFR 49 and stable from results over past few weeks  Will work to get cardiology appt in next 2 weeks.  Medication List with Changes/Refills   New Medications    FUROSEMIDE (LASIX) 20 MG TABLET    Take 1 tablet (20 mg total) by mouth once daily for 3 days, THEN 1 tablet (20 mg total) every other day for 14 days.   Current Medications    APIXABAN (ELIQUIS) 5 MG TAB    Take 1 tablet (5 mg total) by mouth 2 (two) times daily. Take 2 tablets twice daily for 6 days then one tablet two times daily    AZELASTINE (ASTELIN) 137 MCG (0.1 %) NASAL SPRAY    1 spray (137 mcg total) by Nasal route 2 (two) times daily.    DOXYCYCLINE (MONODOX) 100 MG CAPSULE    Take 100 mg by mouth 2 (two) times daily.    FLUTICASONE PROPIONATE (FLONASE) 50 MCG/ACTUATION NASAL SPRAY    2 sprays by Each Nostril route daily as needed for Allergies. Indications: inflammation of the nose due to an allergy    LACTOBACILLUS ACIDOPHILUS ORAL    Take 1 capsule by mouth once daily. Indications: supplement    MAGNESIUM OXIDE (MAG-OX) 400 MG (241.3 MG MAGNESIUM) TABLET    TAKE 1 TABLET EVERY DAY    MEGESTROL (MEGACE) 40 MG TAB    Take  1 tablet (40 mg total) by mouth once daily.    MELATONIN 10 MG CHEW    Take 1 tablet by mouth nightly as needed (insomnia). Pt to take 1/2 gummy (5mg) to 1 gummy (10mg) PO Nightly PRN for insomnia  Indications: insomnia    METOPROLOL SUCCINATE (TOPROL-XL) 25 MG 24 HR TABLET    Take 1 tablet (25 mg total) by mouth 2 (two) times daily.    MULTIVITAMIN WITH FOLIC ACID 400 MCG TAB    Take 1 tablet by mouth once daily. Indications: treatment to prevent vitamin deficiency    PANTOPRAZOLE (PROTONIX) 40 MG TABLET    Take 1 tablet (40 mg total) by mouth nightly. NEEDS PRESCRIPTION SENT, REQUESTED    POTASSIUM SODIUM TARTRATE MISC    Take 1 packet by mouth 4 (four) times daily. Potassium-Sodium Phosphates 280-160-250mg powder packet give 1 packet 4 times daily PO with meals and nightly  Indications: electrolyte replacement    SILDENAFIL (REVATIO) 20 MG TAB    Take 1 tablet (20 mg total) by mouth 3 (three) times daily.    THIAMINE 100 MG TABLET    Take 100 mg by mouth once daily. Indications: deficiency in thiamine or vitamin B1   Discontinued Medications    LACTOBACILLUS RHAMNOSUS GG (CULTURELLE) 10 BILLION CELL CAPSULE    Take 1 capsule by mouth once daily. Indications: GI support while on antibiotics        No follow-ups on file.    This note was generated with the assistance of ambient listening technology. Verbal consent was obtained by the patient and accompanying visitor(s) for the recording of patient appointment to facilitate this note. I attest to having reviewed and edited the generated note for accuracy, though some syntax or spelling errors may persist. Please contact the author of this note for any clarification.

## 2025-06-19 PROBLEM — R53.1 RIGHT SIDED WEAKNESS: Status: ACTIVE | Noted: 2025-06-19

## 2025-06-19 PROBLEM — I50.33 ACUTE ON CHRONIC HEART FAILURE WITH PRESERVED EJECTION FRACTION (HFPEF): Status: ACTIVE | Noted: 2025-06-19

## 2025-06-19 PROBLEM — I38 ENDOCARDITIS: Status: ACTIVE | Noted: 2025-06-19

## 2025-06-19 PROBLEM — G93.41 ACUTE METABOLIC ENCEPHALOPATHY: Status: ACTIVE | Noted: 2025-06-19

## 2025-06-19 PROBLEM — K92.2 GI BLEED: Status: ACTIVE | Noted: 2025-06-19

## 2025-06-19 PROBLEM — J18.9 COMMUNITY ACQUIRED PNEUMONIA: Status: ACTIVE | Noted: 2025-06-19

## 2025-06-19 PROBLEM — Z86.711 HISTORY OF PULMONARY EMBOLISM: Status: ACTIVE | Noted: 2025-06-19

## 2025-06-20 ENCOUNTER — TELEPHONE (OUTPATIENT)
Dept: GASTROENTEROLOGY | Facility: CLINIC | Age: 75
End: 2025-06-20
Payer: MEDICARE

## 2025-06-20 PROBLEM — R54 AGE-RELATED PHYSICAL DEBILITY: Status: ACTIVE | Noted: 2025-06-20

## 2025-06-20 NOTE — TELEPHONE ENCOUNTER
Copied from CRM #[id number]. Topic: General Inquiry - Patient Advice  >> Jun 20, 2025  9:23 AM Nadya wrote:  Type: Needs Medical Advice  Who Called:  [facility]     Additional Information: [facility] calling for surgery consult, for room 4504, Dr. Humberto Field, please call [phone] [place]

## 2025-06-23 PROBLEM — Z75.8 DISCHARGE PLANNING ISSUES: Status: ACTIVE | Noted: 2025-06-23

## 2025-06-23 PROBLEM — T80.818A EXTRAVASATION ACCIDENT: Status: ACTIVE | Noted: 2025-06-23

## 2025-06-24 ENCOUNTER — TELEPHONE (OUTPATIENT)
Dept: GASTROENTEROLOGY | Facility: CLINIC | Age: 75
End: 2025-06-24
Payer: MEDICARE

## 2025-06-24 NOTE — TELEPHONE ENCOUNTER
Returned call to [place]- notified per provider in clinic, we do not see pt's for metabolic encephalopathy. Asked if pt is having current GI problems, what's the reason for the referral. EGD by Dr Martinez- noted (states- JOE, R/O liver cirrhosis, esophageal varices per EGD report)    Cheyanne state she is unsure of why pt need to follow up with GI, the diagnoses attached to the msg was just used since that's what pt was diagnosed with at hospital.   Will call pt to verify symptoms and schedule    Called pt #, spoke with Daughter- sheyla  Attempted to verify reason for GI referral/pt current GI symptoms. Daughter state she is unsure of reason for referral, pt still admitted but she is not currently there. She was notified of pt status by hospital but not why GI referral was needed.     Scheduled pt GI appt, daughter agreed to date/time/location

## 2025-07-02 DIAGNOSIS — Z78.0 MENOPAUSE: ICD-10-CM

## 2025-07-09 ENCOUNTER — OFFICE VISIT (OUTPATIENT)
Dept: CARDIOLOGY | Facility: CLINIC | Age: 75
End: 2025-07-09
Payer: MEDICARE

## 2025-07-09 VITALS
HEIGHT: 65 IN | SYSTOLIC BLOOD PRESSURE: 138 MMHG | BODY MASS INDEX: 25.64 KG/M2 | DIASTOLIC BLOOD PRESSURE: 80 MMHG | WEIGHT: 153.88 LBS | HEART RATE: 86 BPM

## 2025-07-09 DIAGNOSIS — N18.31 STAGE 3A CHRONIC KIDNEY DISEASE: Chronic | ICD-10-CM

## 2025-07-09 DIAGNOSIS — I33.0 ACUTE BACTERIAL ENDOCARDITIS: ICD-10-CM

## 2025-07-09 DIAGNOSIS — I50.32 CHRONIC DIASTOLIC HEART FAILURE: Chronic | ICD-10-CM

## 2025-07-09 DIAGNOSIS — I10 PRIMARY HYPERTENSION: Chronic | ICD-10-CM

## 2025-07-09 DIAGNOSIS — I25.118 CORONARY ARTERY DISEASE OF NATIVE ARTERY OF NATIVE HEART WITH STABLE ANGINA PECTORIS: Chronic | ICD-10-CM

## 2025-07-09 DIAGNOSIS — Z95.4 HX OF MITRAL VALVE REPLACEMENT WITH TISSUE GRAFT: Chronic | ICD-10-CM

## 2025-07-09 DIAGNOSIS — I38 VALVULAR HEART DISEASE: Primary | Chronic | ICD-10-CM

## 2025-07-09 NOTE — PROGRESS NOTES
Subjective:    Patient ID:  Aleida Bliss is a 75 y.o. female who presents for Follow-up and Coronary artery disease involving native coronary artery of        HPI  STATUS POST MULTIPLE ADMISSIONS TO DIFFERENT HOSPITALS STARTED WITH [facility] IN [place] QUESTIONABLE VEGETATION ON THE MITRAL VALVE THEN [facility] IN [place] [facility] WITH ALTERED MENTAL STATUS QUESTIONABLE RIGHT-SIDED WEAKNESS, GI BLEED AFTER BEING TRANSFERRED FROM [facility], DIFFICULT TO ELICIT ANY RELIABLE HISTORY FROM PATIENT AND  TRANSFERRED TO , AND ADMISSION AT [facility], LABS AND RECORDS FROM DIFFERENT PLACES NOTED, LAST [facility] ADMISSION MRI NO DEFINITE ACUTE ABNORMALITY, ECHO NORMAL SYSTOLIC FUNCTION SEVERELY DILATED LEFT ATRIUM AORTIC VALVE SCLEROSUS MITRAL VALVE PLACEMENT OK SOME PARAVALVULAR LEAK, MODERATELY SEVERE TRICUSPID REGURGITATION, WAS FELT THERE WAS NO CVA NEUROLOGY EVALUATION FOR ALTERED MENTAL STATUS WAS SECONDARY TO INFECTION AND HEPATIC ENCEPHALOPATHY, ON ANTIBIOTIC QUESTIONABLE ENDOCARDITIS, AT [facility] INITIALLY WHERE SHE SPENT 6 WEEKS, WAS SENT FROM [facility] TO SEE DR ALCANTARA IN [place], ??? PT AND  NOT SURE WHERE BECAUSE THEY DID NOT WANT TO GO BACK TO [place] FOR ID FOLLOW-UP SAID THAT FOLLOW-UP HAS BEEN ARRANGED AND ALL THE APPOINTMENTS PER DAUGHTER, SEE REVIEW OF SYSTEM      Left Ventricle: The left ventricle is normal in size. Septal motion is consistent with post-operative status. There is normal systolic function. Diastolic function cannot be reliably determined in the presence of mitral valve ring/replacement.    Right Ventricle: The right ventricle is normal in size Systolic function is borderline low.    Left Atrium: The left atrium is severely dilated measuring 66 mL/m2.    Aortic Valve: The aortic valve is a trileaflet valve. There is aortic valve sclerosis.    Mitral Valve: Mitral valve replacement (27 mm Medtronic Mosaic ultra porcine  bioprosthesis). I do not see any new obvious masses or vegetations on the valve comparing it the the prior TTE. There appears to be a paravalvular regurgitant jet at the anterior aspect of the valve. The mean pressure gradient across the mitral valve is 9 mmHg at a heart rate of 77 bpm.    Tricuspid Valve: There is moderate to severe regurgitation.    IVC/SVC: Normal venous pressure at 3 mmHg.    Pericardium: There is no pericardial effusion.  Past Medical History:   Diagnosis Date    Arthritis     Coronary artery disease     GERD (gastroesophageal reflux disease)     Hypertension     Sleep apnea     uses C-PAP    SOB (shortness of breath)     Stroke     TIA approx 2021     Past Surgical History:   Procedure Laterality Date    CATARACT EXTRACTION EXTRACAPSULAR W/ INTRAOCULAR LENS IMPLANTATION      CATHETERIZATION OF BOTH LEFT AND RIGHT HEART N/A 02/15/2022    Procedure: CATHETERIZATION, HEART, BOTH LEFT AND RIGHT;  Surgeon: uCong Hinkle MD;  Location: [facility] CATH;  Service: Cardiology;  Laterality: N/A;    CATHETERIZATION OF BOTH LEFT AND RIGHT HEART  2/22/2024    Procedure: Right / Left heart cath;  Surgeon: Cuong Hinkle MD;  Location: [facility] CATH;  Service: Cardiology;;    CHOLECYSTECTOMY      COLONOSCOPY      COLONOSCOPY N/A 3/15/2022    Procedure: COLONOSCOPY;  Surgeon: Varun Toro MD;  Location: [facility] ENDO;  Service: Endoscopy;  Laterality: N/A;    CORONARY ANGIOGRAPHY N/A 2/22/2024    Procedure: ANGIOGRAM, CORONARY ARTERY;  Surgeon: Cuong Hinkle MD;  Location: [facility] CATH;  Service: Cardiology;  Laterality: N/A;    ESOPHAGOGASTRODUODENOSCOPY N/A 6/21/2025    Procedure: EGD (ESOPHAGOGASTRODUODENOSCOPY);  Surgeon: Mike Martinez Jr., MD;  Location: [facility] ENDO;  Service: Endoscopy;  Laterality: N/A;    EXCLUSION, LEFT ATRIAL APPENDAGE, OPEN, AS PART OF OPEN CHEST SURGERY N/A 4/15/2024    Procedure: EXCLUSION, LEFT ATRIAL APPENDAGE, OPEN, AS PART OF OPEN CHEST SURGERY;  Surgeon: Angela Mcdowell MD;   Location: [facility] OR;  Service: Cardiothoracic;  Laterality: N/A;    HYSTERECTOMY      MITRAL VALVE REPLACEMENT N/A 4/15/2024    Procedure: REPLACEMENT, MITRAL VALVE;  Surgeon: Angela Mcdowell MD;  Location: [facility] OR;  Service: Cardiothoracic;  Laterality: N/A;    TRANSESOPHAGEAL ECHOCARDIOGRAPHY N/A 4/15/2024    Procedure: ECHOCARDIOGRAM, TRANSESOPHAGEAL;  Surgeon: Angela Mcdowell MD;  Location: [facility] OR;  Service: Cardiothoracic;  Laterality: N/A;     Family History   Problem Relation Name Age of Onset    Heart disease Mother      Cancer Father          prostate     Social History     Socioeconomic History    Marital status:    Tobacco Use    Smoking status: Never     Passive exposure: Never    Smokeless tobacco: Never   Substance and Sexual Activity    Alcohol use: No    Drug use: No    Sexual activity: Not Currently     Social Drivers of Health     Financial Resource Strain: Low Risk  (6/20/2025)    Overall Financial Resource Strain (CARDIA)     Difficulty of Paying Living Expenses: Not very hard   Food Insecurity: No Food Insecurity (6/20/2025)    Hunger Vital Sign     Worried About Running Out of Food in the Last Year: Never true     Ran Out of Food in the Last Year: Never true   Recent Concern: Food Insecurity - Food Insecurity Present (4/15/2025)    Received from [facility]    Hunger Vital Sign     Worried About Running Out of Food in the Last Year: Sometimes true   Transportation Needs: No Transportation Needs (6/25/2025)    OASIS : Transportation     Lack of Transportation (Medical): No     Lack of Transportation (Non-Medical): No     Patient Unable or Declines to Respond: No   Physical Activity: Inactive (12/30/2024)    Exercise Vital Sign     Days of Exercise per Week: 0 days     Minutes of Exercise per Session: 0 min   Stress: Stress Concern Present (6/20/2025)    [employer] - Occupational Stress Questionnaire     Feeling of Stress : To some extent  "  Housing Stability: Low Risk  (6/20/2025)    Housing Stability Vital Sign     Unable to Pay for Housing in the Last Year: No     Number of Times Moved in the Last Year: 1     Homeless in the Last Year: No       Review of patient's allergies indicates:   Allergen Reactions    Thiazides Other (See Comments)     Elevates calcium    Lisinopril Other (See Comments)     Other reaction(s): Cough      Cyclobenzaprine Rash    Losartan Nausea Only     Other reaction(s): Unknown       Current Medications[1]    Review of Systems   Constitutional: Negative for chills (SOME), decreased appetite, diaphoresis, fever, malaise/fatigue and night sweats.   HENT:  Negative for congestion and nosebleeds.    Eyes:  Negative for blurred vision and visual disturbance.   Cardiovascular:  Positive for dyspnea on exertion (MILD). Negative for chest pain, claudication, cyanosis, irregular heartbeat, leg swelling, near-syncope, orthopnea, palpitations, paroxysmal nocturnal dyspnea and syncope.   Respiratory:  Negative for cough, hemoptysis, shortness of breath and wheezing.    Endocrine: Negative for polyuria.   Hematologic/Lymphatic: Negative for adenopathy. Does not bruise/bleed easily.   Skin:  Negative for color change and itching.   Musculoskeletal:  Positive for arthritis. Negative for back pain and falls.   Gastrointestinal:  Negative for abdominal pain, change in bowel habit, jaundice, melena and nausea.   Genitourinary:  Negative for dysuria and flank pain.   Neurological:  Negative for brief paralysis, focal weakness, headaches, light-headedness, loss of balance and weakness. Dizziness: OCC.  Psychiatric/Behavioral:  Negative for altered mental status and depression.         Objective:      Vitals:    07/09/25 1047 07/09/25 1106   BP: (!) 161/83 138/80   Pulse: 86    Weight: 69.8 kg (153 lb 14.1 oz)    Height: 5' 5" (1.651 m)    PainSc: 0-No pain      Body mass index is 25.61 kg/m².    Physical Exam  Constitutional:       Appearance: " Normal appearance. She is overweight.   HENT:      Head: Normocephalic and atraumatic.   Eyes:      General: No scleral icterus.     Extraocular Movements: Extraocular movements intact.   Neck:      Thyroid: No thyromegaly.      Vascular: Normal carotid pulses. No carotid bruit, hepatojugular reflux or JVD.      Trachea: Trachea normal. No tracheal deviation.   Cardiovascular:      Rate and Rhythm: Normal rate and regular rhythm. No extrasystoles are present.     Pulses:           Carotid pulses are 2+ on the right side and 2+ on the left side.       Radial pulses are 2+ on the right side and 2+ on the left side.        Posterior tibial pulses are 2+ on the right side and 2+ on the left side.      Heart sounds: Murmur heard.      Systolic murmur is present with a grade of 1/6 at the upper right sternal border.      No friction rub. No gallop. No S4 sounds.   Pulmonary:      Effort: Pulmonary effort is normal. No respiratory distress.      Breath sounds: Normal breath sounds and air entry. No rales.   Chest:       Abdominal:      General: Bowel sounds are normal.      Palpations: Abdomen is soft.      Tenderness: There is no abdominal tenderness.   Musculoskeletal:         General: Normal range of motion.      Cervical back: Neck supple. No edema or erythema.      Right lower leg: No edema.      Left lower leg: No edema.   Skin:     General: Skin is warm and dry.      Capillary Refill: Capillary refill takes less than 2 seconds.   Neurological:      General: No focal deficit present.      Mental Status: She is alert and oriented to person, place, and time.      Cranial Nerves: No cranial nerve deficit.   Psychiatric:         Mood and Affect: Mood normal.         Speech: Speech normal.         Behavior: Behavior normal.                 ..    Chemistry        Component Value Date/Time     06/24/2025 0513    K 3.8 06/24/2025 0513     06/24/2025 0513    CO2 19 (L) 06/24/2025 0513    BUN 16 06/24/2025 0513     CREATININE 1.05 06/24/2025 0513    GLU 80 06/24/2025 0513        Component Value Date/Time    CALCIUM 8.3 (L) 06/24/2025 0513    ALKPHOS 96 06/24/2025 0513    AST 32 06/24/2025 0513    ALT 13 06/24/2025 0513    BILITOT 0.4 06/24/2025 0513    ESTGFRAFRICA >60 02/15/2022 0818    EGFRNONAA >60 02/15/2022 0818            ..  Lab Results   Component Value Date    CHOL 127 06/19/2025    CHOL 183 04/04/2024    CHOL 170 08/12/2022     Lab Results   Component Value Date    HDL 37 (L) 06/19/2025    HDL 47 04/04/2024    HDL 52 08/12/2022     Lab Results   Component Value Date    LDLCALC 73.6 06/19/2025    LDLCALC 119.6 04/04/2024    LDLCALC 104.2 08/12/2022     Lab Results   Component Value Date    TRIG 82 06/19/2025    TRIG 82 04/04/2024    TRIG 69 08/12/2022     Lab Results   Component Value Date    CHOLHDL 29.1 06/19/2025    CHOLHDL 25.7 04/04/2024    CHOLHDL 30.6 08/12/2022     ..  Lab Results   Component Value Date    WBC 9.02 06/24/2025    HGB 9.4 (L) 06/24/2025    HCT 29.8 (L) 06/24/2025    MCV 96 06/24/2025     06/24/2025       Test(s) Reviewed  I have reviewed the following in detail:  [] Stress test   [] Angiography   [x] Echocardiogram   [x] Labs   [x] Other:       Assessment:         ICD-10-CM ICD-9-CM   1. Valvular heart disease  I38 424.90   2. Acute bacterial endocarditis  I33.0 421.0   3. Chronic diastolic heart failure  I50.32 428.32   4. Primary hypertension  I10 401.9   5. Hx of mitral valve replacement with tissue graft  Z95.4 V42.2   6. Coronary artery disease of native artery of native heart with stable angina pectoris  I25.118 414.01     413.9   7. Stage 3a chronic kidney disease  N18.31 585.3     Problem List Items Addressed This Visit          Cardiac/Vascular    Primary hypertension    Relevant Orders    Hypertension Digital Medicine (HDMP) Enrollment Order (Completed)    Coronary artery disease of native artery of native heart with stable angina pectoris    Relevant Orders    CBC Auto  Differential    Magnesium    Hx of mitral valve replacement with tissue graft    Valvular heart disease - Primary    Endocarditis    Relevant Orders    CBC Auto Differential    Chronic diastolic heart failure    Relevant Orders    Basic Metabolic Panel    BNP     Other Visit Diagnoses         Stage 3a chronic kidney disease  (Chronic)       GFR 55 MONITOR    Relevant Orders    Basic Metabolic Panel    Magnesium             Plan:     MULTIPLE ADMISSIONS AND PROBLEM SINCE APRIL APPEARS TO BE NOW STABLE STRESS THE IMPORTANCE OF ID FOLLOW-UP WITH THE PATIENT AND HER , ECHO FROM [facility] IN MAY NO VEGETATION ECHO IN [place]UNE FROM [facility] NO VEGETATION CLINICALLY NO EVIDENCE OF PERSISTENT INFECTION OR ENDOCARDITIS TYPE SYMPTOMS, DAILY WEIGHT 2 LB PER DAY 5 LB PER WEEK RULE EXPLAINED    ALL CV CLINICALLY STABLE, NO ANGINA, STABLE HF, NO TIA, NO CLINICAL ARRHYTHMIA,CONTINUE CURRENT MEDS, EDUCATION, DIET, EXERCISE , COMPLIANCE WITH MEDS AND FOLLOW-UP WILL FOLLOW CLOSELY, DIGITAL HYPERTENSION REFERRAL 10 TO CLINIC IN FEW WEEKS WITH LABS, NO JAYANT NEEDED AT THIS POINT        Valvular heart disease    Acute bacterial endocarditis  -     Ambulatory referral/consult to Cardiology  -     CBC Auto Differential; Future; Expected date: 07/09/2025    Chronic diastolic heart failure  -     Basic Metabolic Panel; Future; Expected date: 07/09/2025  -     BNP; Future; Expected date: 01/09/2026    Primary hypertension  -     Ambulatory referral/consult to Cardiology  -     Hypertension Digital Medicine (HDMP) Enrollment Order    Hx of mitral valve replacement with tissue graft    Coronary artery disease of native artery of native heart with stable angina pectoris  -     CBC Auto Differential; Future; Expected date: 07/09/2025  -     Magnesium; Future; Expected date: 07/09/2025    Stage 3a chronic kidney disease  Comments:  GFR 55 MONITOR  Orders:  -     Basic Metabolic Panel; Future; Expected date: 07/09/2025  -      Magnesium; Future; Expected date: 07/09/2025    RTC Low level/low impact aerobic exercise 5x's/wk. Heart healthy diet and risk factor modification.    See labs and med orders.    Aerobic exercise 5x's/wk. Heart healthy diet and risk factor modification.    See labs and med orders.             [1]   Current Outpatient Medications:     acetaminophen (TYLENOL) 325 MG tablet, Take 2 tablets (650 mg total) by mouth every 6 (six) hours as needed for Pain., Disp: , Rfl:     albuterol (PROVENTIL HFA) 90 mcg/actuation inhaler, Inhale 2 puffs into the lungs every 6 (six) hours as needed for Wheezing. Rescue, Disp: 18 g, Rfl: 0    apixaban (ELIQUIS) 5 mg Tab, Take 1 tablet (5 mg total) by mouth 2 (two) times daily., Disp: , Rfl:     doxycycline (MONODOX) 100 MG capsule, Take 100 mg by mouth 2 (two) times daily., Disp: , Rfl:     fluticasone propionate (FLONASE) 50 mcg/actuation nasal spray, 2 sprays by Each Nostril route daily as needed for Allergies. Indications: inflammation of the nose due to an allergy, Disp: , Rfl:     furosemide (LASIX) 20 MG tablet, Take 1 tablet (20 mg total) by mouth once daily for 3 days, THEN 1 tablet (20 mg total) every other day for 14 days., Disp: 30 tablet, Rfl: 0    hydrocortisone 2.5 % cream, Apply topically 2 (two) times daily as needed (hemorrhoids)., Disp: , Rfl:     LACTOBACILLUS ACIDOPHILUS ORAL, Take 1 capsule by mouth once daily. Indications: supplement, Disp: , Rfl:     lactulose (CHRONULAC) 20 gram/30 mL Soln, Take 30 mLs (20 g total) by mouth 3 (three) times daily. Titrate to 3 bowel movements per day., Disp: 2700 mL, Rfl: 0    magnesium oxide (MAG-OX) 400 mg (241.3 mg magnesium) tablet, TAKE 1 TABLET EVERY DAY, Disp: 90 tablet, Rfl: 3    megestroL (MEGACE) 40 MG Tab, Take 1 tablet (40 mg total) by mouth once daily., Disp: 90 tablet, Rfl: 1    MELATONIN ORAL, Take 1 tablet by mouth nightly as needed (insomnia). Indications: insomnia, Disp: , Rfl:     metoprolol succinate (TOPROL-XL)  25 MG 24 hr tablet, Take 1 tablet (25 mg total) by mouth 2 (two) times daily., Disp: 180 tablet, Rfl: 1    multivitamin with folic acid 400 mcg Tab, Take 1 tablet by mouth once daily. Indications: treatment to prevent vitamin deficiency, Disp: , Rfl:     pantoprazole (PROTONIX) 40 MG tablet, Take 1 tablet (40 mg total) by mouth nightly. NEEDS PRESCRIPTION SENT, REQUESTED, Disp: 90 tablet, Rfl: 1    POTASSIUM SODIUM TARTRATE MISC, Take 1 packet by mouth 4 (four) times daily with meals and nightly. Indications: electrolyte replacement, Disp: , Rfl:     sildenafil (REVATIO) 20 mg Tab, Take 1 tablet (20 mg total) by mouth 3 (three) times daily., Disp: 270 tablet, Rfl: 1    thiamine 100 MG tablet, Take 100 mg by mouth once daily. Indications: deficiency in thiamine or vitamin B1, Disp: , Rfl:

## 2025-07-28 ENCOUNTER — TELEPHONE (OUTPATIENT)
Dept: FAMILY MEDICINE | Facility: CLINIC | Age: 75
End: 2025-07-28
Payer: MEDICARE

## 2025-07-28 DIAGNOSIS — R60.0 EDEMA OF BOTH LOWER EXTREMITIES: ICD-10-CM

## 2025-07-28 DIAGNOSIS — R06.02 SHORTNESS OF BREATH: ICD-10-CM

## 2025-07-28 RX ORDER — FUROSEMIDE 20 MG/1
20 TABLET ORAL DAILY
Qty: 30 TABLET | Refills: 0 | Status: SHIPPED | OUTPATIENT
Start: 2025-07-28 | End: 2025-08-27

## 2025-07-28 NOTE — TELEPHONE ENCOUNTER
Copied from CRM #[id number]. Topic: Medications - Medication Question  >> Jul 28, 2025 12:23 PM Franchesca wrote:  Type:  Needs Medical Advice    Who Called: patients daughter [phone]  Additional Information: Patient is still retaining a lot of fluid despite being on a fluid pill, daughter would like someone to give her a call, please call and advise. Thank you.

## 2025-07-28 NOTE — TELEPHONE ENCOUNTER
Spoke with daughter via telephone. Instructed to take Lasix again tomorrow (20 mg). She is scheduled to have labs tomorrow and I will call back to discuss further dosages. I sent refill today for 30 tabs because states only has 1 pill left. She has been taking every other day.

## 2025-07-29 ENCOUNTER — LAB VISIT (OUTPATIENT)
Dept: PRIMARY CARE CLINIC | Facility: CLINIC | Age: 75
End: 2025-07-29
Payer: MEDICARE

## 2025-07-29 DIAGNOSIS — I25.118 CORONARY ARTERY DISEASE OF NATIVE ARTERY OF NATIVE HEART WITH STABLE ANGINA PECTORIS: ICD-10-CM

## 2025-07-29 DIAGNOSIS — I33.0 ACUTE BACTERIAL ENDOCARDITIS: ICD-10-CM

## 2025-07-29 DIAGNOSIS — I50.32 CHRONIC DIASTOLIC HEART FAILURE: ICD-10-CM

## 2025-07-29 DIAGNOSIS — N18.31 STAGE 3A CHRONIC KIDNEY DISEASE: Chronic | ICD-10-CM

## 2025-07-29 LAB
ABSOLUTE EOSINOPHIL (OHS): 0.14 K/UL
ABSOLUTE MONOCYTE (OHS): 0.78 K/UL (ref 0.3–1)
ABSOLUTE NEUTROPHIL COUNT (OHS): 3.54 K/UL (ref 1.8–7.7)
ANION GAP (OHS): 6 MMOL/L (ref 8–16)
BASOPHILS # BLD AUTO: 0.1 K/UL
BASOPHILS NFR BLD AUTO: 1.6 %
BUN SERPL-MCNC: 20 MG/DL (ref 8–23)
CALCIUM SERPL-MCNC: 9 MG/DL (ref 8.7–10.5)
CHLORIDE SERPL-SCNC: 112 MMOL/L (ref 95–110)
CO2 SERPL-SCNC: 20 MMOL/L (ref 23–29)
CREAT SERPL-MCNC: 1.2 MG/DL (ref 0.5–1.4)
ERYTHROCYTE [DISTWIDTH] IN BLOOD BY AUTOMATED COUNT: 17.3 % (ref 11.5–14.5)
GFR SERPLBLD CREATININE-BSD FMLA CKD-EPI: 47 ML/MIN/1.73/M2
GLUCOSE SERPL-MCNC: 97 MG/DL (ref 70–110)
HCT VFR BLD AUTO: 26.8 % (ref 37–48.5)
HGB BLD-MCNC: 8.5 GM/DL (ref 12–16)
IMM GRANULOCYTES # BLD AUTO: 0.01 K/UL (ref 0–0.04)
IMM GRANULOCYTES NFR BLD AUTO: 0.2 % (ref 0–0.5)
LYMPHOCYTES # BLD AUTO: 1.74 K/UL (ref 1–4.8)
MAGNESIUM SERPL-MCNC: 1 MG/DL (ref 1.6–2.6)
MCH RBC QN AUTO: 29 PG (ref 27–31)
MCHC RBC AUTO-ENTMCNC: 31.7 G/DL (ref 32–36)
MCV RBC AUTO: 92 FL (ref 82–98)
NT-PROBNP SERPL-MCNC: 5216 PG/ML
NUCLEATED RBC (/100WBC) (OHS): 0 /100 WBC
PLATELET # BLD AUTO: 219 K/UL (ref 150–450)
PMV BLD AUTO: 11.5 FL (ref 9.2–12.9)
POTASSIUM SERPL-SCNC: 3.6 MMOL/L (ref 3.5–5.1)
RBC # BLD AUTO: 2.93 M/UL (ref 4–5.4)
RELATIVE EOSINOPHIL (OHS): 2.2 %
RELATIVE LYMPHOCYTE (OHS): 27.6 % (ref 18–48)
RELATIVE MONOCYTE (OHS): 12.4 % (ref 4–15)
RELATIVE NEUTROPHIL (OHS): 56 % (ref 38–73)
SODIUM SERPL-SCNC: 138 MMOL/L (ref 136–145)
WBC # BLD AUTO: 6.31 K/UL (ref 3.9–12.7)

## 2025-07-29 PROCEDURE — 85025 COMPLETE CBC W/AUTO DIFF WBC: CPT | Performed by: INTERNAL MEDICINE

## 2025-07-29 PROCEDURE — 83880 ASSAY OF NATRIURETIC PEPTIDE: CPT | Performed by: INTERNAL MEDICINE

## 2025-07-29 PROCEDURE — 83735 ASSAY OF MAGNESIUM: CPT | Performed by: INTERNAL MEDICINE

## 2025-07-29 PROCEDURE — 80048 BASIC METABOLIC PNL TOTAL CA: CPT | Performed by: INTERNAL MEDICINE

## 2025-07-29 PROCEDURE — 36415 COLL VENOUS BLD VENIPUNCTURE: CPT | Mod: S$GLB,,, | Performed by: INTERNAL MEDICINE

## 2025-08-02 DIAGNOSIS — R06.02 SHORTNESS OF BREATH: ICD-10-CM

## 2025-08-02 DIAGNOSIS — R60.0 EDEMA OF BOTH LOWER EXTREMITIES: ICD-10-CM

## 2025-08-02 DIAGNOSIS — K21.9 GASTROESOPHAGEAL REFLUX DISEASE, UNSPECIFIED WHETHER ESOPHAGITIS PRESENT: ICD-10-CM

## 2025-08-02 DIAGNOSIS — R54 FRAILTY: ICD-10-CM

## 2025-08-02 DIAGNOSIS — I26.99 BILATERAL PULMONARY EMBOLISM: ICD-10-CM

## 2025-08-04 RX ORDER — FUROSEMIDE 20 MG/1
20 TABLET ORAL DAILY
Qty: 90 TABLET | Refills: 0 | Status: SHIPPED | OUTPATIENT
Start: 2025-08-04

## 2025-08-04 RX ORDER — TRIAMCINOLONE ACETONIDE 1 MG/G
CREAM TOPICAL 2 TIMES DAILY
Qty: 90 G | Refills: 1 | Status: SHIPPED | OUTPATIENT
Start: 2025-08-04 | End: 2026-07-30

## 2025-08-05 RX ORDER — PANTOPRAZOLE SODIUM 40 MG/1
40 TABLET, DELAYED RELEASE ORAL NIGHTLY
Qty: 90 TABLET | Refills: 1 | Status: SHIPPED | OUTPATIENT
Start: 2025-08-05

## 2025-08-05 RX ORDER — MEGESTROL ACETATE 40 MG/1
40 TABLET ORAL DAILY
Qty: 90 TABLET | Refills: 1 | Status: SHIPPED | OUTPATIENT
Start: 2025-08-05

## 2025-08-20 ENCOUNTER — OFFICE VISIT (OUTPATIENT)
Dept: CARDIOLOGY | Facility: CLINIC | Age: 75
End: 2025-08-20
Payer: MEDICARE

## 2025-08-20 VITALS
HEART RATE: 98 BPM | BODY MASS INDEX: 22.56 KG/M2 | HEIGHT: 65 IN | WEIGHT: 135.38 LBS | SYSTOLIC BLOOD PRESSURE: 127 MMHG | DIASTOLIC BLOOD PRESSURE: 60 MMHG

## 2025-08-20 DIAGNOSIS — I25.118 CORONARY ARTERY DISEASE OF NATIVE ARTERY OF NATIVE HEART WITH STABLE ANGINA PECTORIS: Chronic | ICD-10-CM

## 2025-08-20 DIAGNOSIS — I27.20 MILD PULMONARY HYPERTENSION: Chronic | ICD-10-CM

## 2025-08-20 DIAGNOSIS — Z73.6 LIMITATION OF ACTIVITIES DUE TO DISABILITY: Chronic | ICD-10-CM

## 2025-08-20 DIAGNOSIS — N18.32 STAGE 3B CHRONIC KIDNEY DISEASE: Chronic | ICD-10-CM

## 2025-08-20 DIAGNOSIS — I50.33 ACUTE ON CHRONIC HEART FAILURE WITH PRESERVED EJECTION FRACTION (HFPEF): Primary | Chronic | ICD-10-CM

## 2025-08-20 DIAGNOSIS — Z79.01 LONG TERM (CURRENT) USE OF ANTICOAGULANTS: Chronic | ICD-10-CM

## 2025-08-20 PROCEDURE — 3288F FALL RISK ASSESSMENT DOCD: CPT | Mod: CPTII,S$GLB,, | Performed by: INTERNAL MEDICINE

## 2025-08-20 PROCEDURE — 1100F PTFALLS ASSESS-DOCD GE2>/YR: CPT | Mod: CPTII,S$GLB,, | Performed by: INTERNAL MEDICINE

## 2025-08-20 PROCEDURE — 3074F SYST BP LT 130 MM HG: CPT | Mod: CPTII,S$GLB,, | Performed by: INTERNAL MEDICINE

## 2025-08-20 PROCEDURE — 1126F AMNT PAIN NOTED NONE PRSNT: CPT | Mod: CPTII,S$GLB,, | Performed by: INTERNAL MEDICINE

## 2025-08-20 PROCEDURE — 1159F MED LIST DOCD IN RCRD: CPT | Mod: CPTII,S$GLB,, | Performed by: INTERNAL MEDICINE

## 2025-08-20 PROCEDURE — 99214 OFFICE O/P EST MOD 30 MIN: CPT | Mod: S$GLB,,, | Performed by: INTERNAL MEDICINE

## 2025-08-20 PROCEDURE — 3078F DIAST BP <80 MM HG: CPT | Mod: CPTII,S$GLB,, | Performed by: INTERNAL MEDICINE

## 2025-08-20 RX ORDER — ATORVASTATIN CALCIUM 10 MG/1
10 TABLET, FILM COATED ORAL
COMMUNITY
Start: 2025-08-16 | End: 2026-02-12

## 2025-08-20 RX ORDER — DAPAGLIFLOZIN 5 MG/1
5 TABLET, FILM COATED ORAL
COMMUNITY
Start: 2025-08-17 | End: 2026-02-13

## 2025-08-20 RX ORDER — SACUBITRIL AND VALSARTAN 24; 26 MG/1; MG/1
0.5 TABLET ORAL
COMMUNITY
Start: 2025-08-16

## 2025-08-20 RX ORDER — SPIRONOLACTONE 25 MG/1
12.5 TABLET ORAL DAILY
Qty: 45 TABLET | Refills: 0 | Status: SHIPPED | OUTPATIENT
Start: 2025-08-20

## 2025-08-22 ENCOUNTER — OFFICE VISIT (OUTPATIENT)
Dept: PRIMARY CARE CLINIC | Facility: CLINIC | Age: 75
End: 2025-08-22
Payer: MEDICARE

## 2025-08-22 VITALS
WEIGHT: 136.44 LBS | BODY MASS INDEX: 22.73 KG/M2 | TEMPERATURE: 98 F | DIASTOLIC BLOOD PRESSURE: 72 MMHG | RESPIRATION RATE: 17 BRPM | HEART RATE: 94 BPM | HEIGHT: 65 IN | OXYGEN SATURATION: 99 % | SYSTOLIC BLOOD PRESSURE: 100 MMHG

## 2025-08-22 DIAGNOSIS — I10 PRIMARY HYPERTENSION: ICD-10-CM

## 2025-08-22 DIAGNOSIS — N18.32 CKD STAGE 3B, GFR 30-44 ML/MIN: ICD-10-CM

## 2025-08-22 DIAGNOSIS — Z92.89 HISTORY OF RECENT HOSPITALIZATION: Primary | ICD-10-CM

## 2025-08-22 DIAGNOSIS — I50.33 ACUTE ON CHRONIC HEART FAILURE WITH PRESERVED EJECTION FRACTION (HFPEF): ICD-10-CM

## 2025-08-22 DIAGNOSIS — K21.9 GASTROESOPHAGEAL REFLUX DISEASE, UNSPECIFIED WHETHER ESOPHAGITIS PRESENT: ICD-10-CM

## 2025-08-22 DIAGNOSIS — R54 AGE-RELATED PHYSICAL DEBILITY: ICD-10-CM

## 2025-08-22 DIAGNOSIS — I50.32 CHRONIC DIASTOLIC HEART FAILURE: ICD-10-CM

## 2025-08-22 DIAGNOSIS — Z86.79 HISTORY OF BACTERIAL ENDOCARDITIS: ICD-10-CM

## 2025-08-22 PROCEDURE — 1126F AMNT PAIN NOTED NONE PRSNT: CPT | Mod: CPTII,S$GLB,, | Performed by: NURSE PRACTITIONER

## 2025-08-22 PROCEDURE — 3074F SYST BP LT 130 MM HG: CPT | Mod: CPTII,S$GLB,, | Performed by: NURSE PRACTITIONER

## 2025-08-22 PROCEDURE — G2211 COMPLEX E/M VISIT ADD ON: HCPCS | Mod: S$GLB,,, | Performed by: NURSE PRACTITIONER

## 2025-08-22 PROCEDURE — 1159F MED LIST DOCD IN RCRD: CPT | Mod: CPTII,S$GLB,, | Performed by: NURSE PRACTITIONER

## 2025-08-22 PROCEDURE — 3288F FALL RISK ASSESSMENT DOCD: CPT | Mod: CPTII,S$GLB,, | Performed by: NURSE PRACTITIONER

## 2025-08-22 PROCEDURE — 99215 OFFICE O/P EST HI 40 MIN: CPT | Mod: S$GLB,,, | Performed by: NURSE PRACTITIONER

## 2025-08-22 PROCEDURE — 3078F DIAST BP <80 MM HG: CPT | Mod: CPTII,S$GLB,, | Performed by: NURSE PRACTITIONER

## 2025-08-22 PROCEDURE — 1101F PT FALLS ASSESS-DOCD LE1/YR: CPT | Mod: CPTII,S$GLB,, | Performed by: NURSE PRACTITIONER

## 2025-08-22 PROCEDURE — 1160F RVW MEDS BY RX/DR IN RCRD: CPT | Mod: CPTII,S$GLB,, | Performed by: NURSE PRACTITIONER

## 2025-08-22 RX ORDER — FAMOTIDINE 40 MG/1
40 TABLET, FILM COATED ORAL NIGHTLY
Qty: 90 TABLET | Refills: 1 | Status: SHIPPED | OUTPATIENT
Start: 2025-08-22 | End: 2026-08-22

## 2025-08-27 PROBLEM — N18.32 CKD STAGE 3B, GFR 30-44 ML/MIN: Status: ACTIVE | Noted: 2025-08-27
